# Patient Record
Sex: MALE | Race: WHITE | NOT HISPANIC OR LATINO | Employment: FULL TIME | URBAN - METROPOLITAN AREA
[De-identification: names, ages, dates, MRNs, and addresses within clinical notes are randomized per-mention and may not be internally consistent; named-entity substitution may affect disease eponyms.]

---

## 2017-01-14 ENCOUNTER — APPOINTMENT (OUTPATIENT)
Dept: LAB | Facility: HOSPITAL | Age: 42
End: 2017-01-14
Payer: COMMERCIAL

## 2017-01-14 ENCOUNTER — TRANSCRIBE ORDERS (OUTPATIENT)
Dept: LAB | Facility: HOSPITAL | Age: 42
End: 2017-01-14

## 2017-01-14 DIAGNOSIS — Z00.00 ROUTINE GENERAL MEDICAL EXAMINATION AT A HEALTH CARE FACILITY: Primary | ICD-10-CM

## 2017-01-14 DIAGNOSIS — Z00.00 ROUTINE GENERAL MEDICAL EXAMINATION AT A HEALTH CARE FACILITY: ICD-10-CM

## 2017-01-14 LAB
ALBUMIN SERPL BCP-MCNC: 4.4 G/DL (ref 3.5–5)
ALP SERPL-CCNC: 59 U/L (ref 46–116)
ALT SERPL W P-5'-P-CCNC: 111 U/L (ref 12–78)
ANION GAP SERPL CALCULATED.3IONS-SCNC: 7 MMOL/L (ref 4–13)
AST SERPL W P-5'-P-CCNC: 50 U/L (ref 5–45)
BILIRUB SERPL-MCNC: 0.6 MG/DL (ref 0.2–1)
BUN SERPL-MCNC: 15 MG/DL (ref 5–25)
CALCIUM SERPL-MCNC: 8.8 MG/DL (ref 8.3–10.1)
CHLORIDE SERPL-SCNC: 105 MMOL/L (ref 100–108)
CHOLEST SERPL-MCNC: 171 MG/DL (ref 50–200)
CO2 SERPL-SCNC: 27 MMOL/L (ref 21–32)
CREAT SERPL-MCNC: 0.87 MG/DL (ref 0.6–1.3)
GFR SERPL CREATININE-BSD FRML MDRD: >60 ML/MIN/1.73SQ M
GLUCOSE SERPL-MCNC: 104 MG/DL (ref 65–140)
HDLC SERPL-MCNC: 40 MG/DL (ref 40–60)
LDLC SERPL CALC-MCNC: 101 MG/DL (ref 0–100)
POTASSIUM SERPL-SCNC: 4.2 MMOL/L (ref 3.5–5.3)
PROT SERPL-MCNC: 7.8 G/DL (ref 6.4–8.2)
SODIUM SERPL-SCNC: 139 MMOL/L (ref 136–145)
TRIGL SERPL-MCNC: 151 MG/DL

## 2017-01-14 PROCEDURE — 36415 COLL VENOUS BLD VENIPUNCTURE: CPT

## 2017-01-14 PROCEDURE — 80061 LIPID PANEL: CPT

## 2017-01-14 PROCEDURE — 80053 COMPREHEN METABOLIC PANEL: CPT

## 2017-03-06 RX ORDER — MULTIVITAMIN
1 TABLET ORAL DAILY
COMMUNITY

## 2017-03-06 RX ORDER — UREA 10 %
1 LOTION (ML) TOPICAL
COMMUNITY
End: 2018-11-14

## 2017-03-09 ENCOUNTER — ANESTHESIA EVENT (OUTPATIENT)
Dept: PERIOP | Facility: HOSPITAL | Age: 42
End: 2017-03-09
Payer: COMMERCIAL

## 2017-03-10 ENCOUNTER — HOSPITAL ENCOUNTER (OUTPATIENT)
Facility: HOSPITAL | Age: 42
Setting detail: OUTPATIENT SURGERY
Discharge: HOME | End: 2017-03-10
Attending: COLON & RECTAL SURGERY | Admitting: COLON & RECTAL SURGERY
Payer: COMMERCIAL

## 2017-03-10 ENCOUNTER — ANESTHESIA (OUTPATIENT)
Dept: PERIOP | Facility: HOSPITAL | Age: 42
End: 2017-03-10
Payer: COMMERCIAL

## 2017-03-10 VITALS
HEART RATE: 83 BPM | RESPIRATION RATE: 20 BRPM | BODY MASS INDEX: 31.56 KG/M2 | DIASTOLIC BLOOD PRESSURE: 60 MMHG | WEIGHT: 233 LBS | OXYGEN SATURATION: 96 % | SYSTOLIC BLOOD PRESSURE: 125 MMHG | HEIGHT: 72 IN | TEMPERATURE: 97.4 F

## 2017-03-10 PROBLEM — K64.8 HEMORRHOIDS, COMPLICATED: Status: ACTIVE | Noted: 2017-03-10

## 2017-03-10 RX ORDER — PROPOFOL 10 MG/ML
INJECTION, EMULSION INTRAVENOUS AS NEEDED
Status: DISCONTINUED | OUTPATIENT
Start: 2017-03-10 | End: 2017-03-10 | Stop reason: SURG

## 2017-03-10 RX ORDER — SODIUM CHLORIDE, SODIUM LACTATE, POTASSIUM CHLORIDE, CALCIUM CHLORIDE 600; 310; 30; 20 MG/100ML; MG/100ML; MG/100ML; MG/100ML
50 INJECTION, SOLUTION INTRAVENOUS CONTINUOUS
Status: DISCONTINUED | OUTPATIENT
Start: 2017-03-10 | End: 2017-03-10 | Stop reason: HOSPADM

## 2017-03-10 RX ORDER — SODIUM CHLORIDE, SODIUM LACTATE, POTASSIUM CHLORIDE, CALCIUM CHLORIDE 600; 310; 30; 20 MG/100ML; MG/100ML; MG/100ML; MG/100ML
125 INJECTION, SOLUTION INTRAVENOUS CONTINUOUS
Status: DISCONTINUED | OUTPATIENT
Start: 2017-03-10 | End: 2017-03-10 | Stop reason: HOSPADM

## 2017-03-10 RX ADMIN — PROPOFOL 150 MG: 10 INJECTION, EMULSION INTRAVENOUS at 07:39

## 2017-03-10 RX ADMIN — SODIUM CHLORIDE, SODIUM LACTATE, POTASSIUM CHLORIDE, AND CALCIUM CHLORIDE 50 ML/HR: .6; .31; .03; .02 INJECTION, SOLUTION INTRAVENOUS at 06:37

## 2017-03-10 RX ADMIN — PROPOFOL 50 MG: 10 INJECTION, EMULSION INTRAVENOUS at 07:42

## 2017-10-26 ENCOUNTER — TRANSCRIBE ORDERS (OUTPATIENT)
Dept: ADMINISTRATIVE | Facility: HOSPITAL | Age: 42
End: 2017-10-26

## 2017-10-26 DIAGNOSIS — R91.8 ABNORMAL CHEST X-RAY WITH MULTIPLE LUNG NODULES: Primary | ICD-10-CM

## 2017-10-31 ENCOUNTER — HOSPITAL ENCOUNTER (OUTPATIENT)
Dept: CT IMAGING | Facility: HOSPITAL | Age: 42
Discharge: HOME | End: 2017-10-31
Payer: COMMERCIAL

## 2017-10-31 DIAGNOSIS — R91.8 ABNORMAL CHEST X-RAY WITH MULTIPLE LUNG NODULES: ICD-10-CM

## 2017-10-31 PROCEDURE — 71250 CT THORAX DX C-: CPT

## 2017-11-13 ENCOUNTER — HOSPITAL ENCOUNTER (EMERGENCY)
Facility: HOSPITAL | Age: 42
Discharge: HOME | End: 2017-11-13
Attending: EMERGENCY MEDICINE | Admitting: EMERGENCY MEDICINE
Payer: COMMERCIAL

## 2017-11-13 ENCOUNTER — APPOINTMENT (EMERGENCY)
Dept: CT IMAGING | Facility: HOSPITAL | Age: 42
End: 2017-11-13
Payer: COMMERCIAL

## 2017-11-13 VITALS
WEIGHT: 220 LBS | DIASTOLIC BLOOD PRESSURE: 81 MMHG | HEART RATE: 83 BPM | HEIGHT: 72 IN | SYSTOLIC BLOOD PRESSURE: 127 MMHG | OXYGEN SATURATION: 100 % | BODY MASS INDEX: 29.8 KG/M2 | TEMPERATURE: 98.6 F | RESPIRATION RATE: 18 BRPM

## 2017-11-13 DIAGNOSIS — R10.31 BILATERAL GROIN PAIN: ICD-10-CM

## 2017-11-13 DIAGNOSIS — R10.32 BILATERAL GROIN PAIN: ICD-10-CM

## 2017-11-13 DIAGNOSIS — M54.50 BILATERAL LOW BACK PAIN WITHOUT SCIATICA: Primary | ICD-10-CM

## 2017-11-13 LAB
ALBUMIN SERPL BCP-MCNC: 4.8 G/DL (ref 3.5–5)
ALP SERPL-CCNC: 58 U/L (ref 46–116)
ALT SERPL W P-5'-P-CCNC: 99 U/L (ref 12–78)
ANION GAP SERPL CALCULATED.3IONS-SCNC: 11 MMOL/L (ref 4–13)
AST SERPL W P-5'-P-CCNC: 51 U/L (ref 5–45)
BASOPHILS # BLD AUTO: 0.11 THOUSANDS/ΜL (ref 0–0.1)
BASOPHILS NFR BLD AUTO: 1 % (ref 0–1)
BILIRUB SERPL-MCNC: 0.9 MG/DL (ref 0.2–1)
BILIRUB UR QL STRIP: NEGATIVE
BUN SERPL-MCNC: 14 MG/DL (ref 5–25)
CALCIUM SERPL-MCNC: 9.4 MG/DL (ref 8.3–10.1)
CHLORIDE SERPL-SCNC: 103 MMOL/L (ref 100–108)
CLARITY UR: CLEAR
CO2 SERPL-SCNC: 26 MMOL/L (ref 21–32)
COLOR UR: YELLOW
CREAT SERPL-MCNC: 0.92 MG/DL (ref 0.6–1.3)
EOSINOPHIL # BLD AUTO: 0.18 THOUSAND/ΜL (ref 0–0.61)
EOSINOPHIL NFR BLD AUTO: 2 % (ref 0–6)
ERYTHROCYTE [DISTWIDTH] IN BLOOD BY AUTOMATED COUNT: 12.7 % (ref 11.6–15.1)
GFR SERPL CREATININE-BSD FRML MDRD: 102 ML/MIN/1.73SQ M
GLUCOSE SERPL-MCNC: 83 MG/DL (ref 65–140)
GLUCOSE UR STRIP-MCNC: NEGATIVE MG/DL
HCT VFR BLD AUTO: 51 % (ref 36.5–49.3)
HGB BLD-MCNC: 17.2 G/DL (ref 12–17)
HGB UR QL STRIP.AUTO: NEGATIVE
KETONES UR STRIP-MCNC: NEGATIVE MG/DL
LEUKOCYTE ESTERASE UR QL STRIP: NEGATIVE
LIPASE SERPL-CCNC: 295 U/L (ref 73–393)
LYMPHOCYTES # BLD AUTO: 1.48 THOUSANDS/ΜL (ref 0.6–4.47)
LYMPHOCYTES NFR BLD AUTO: 18 % (ref 14–44)
MCH RBC QN AUTO: 29.2 PG (ref 26.8–34.3)
MCHC RBC AUTO-ENTMCNC: 33.7 G/DL (ref 31.4–37.4)
MCV RBC AUTO: 87 FL (ref 82–98)
MONOCYTES # BLD AUTO: 1.14 THOUSAND/ΜL (ref 0.17–1.22)
MONOCYTES NFR BLD AUTO: 14 % (ref 4–12)
NEUTROPHILS # BLD AUTO: 5.16 THOUSANDS/ΜL (ref 1.85–7.62)
NEUTS SEG NFR BLD AUTO: 64 % (ref 43–75)
NITRITE UR QL STRIP: NEGATIVE
NRBC BLD AUTO-RTO: 0 /100 WBCS
PH UR STRIP.AUTO: 6 [PH] (ref 4.5–8)
PLATELET # BLD AUTO: 267 THOUSANDS/UL (ref 149–390)
PMV BLD AUTO: 11.6 FL (ref 8.9–12.7)
POTASSIUM SERPL-SCNC: 4.3 MMOL/L (ref 3.5–5.3)
PROT SERPL-MCNC: 8.3 G/DL (ref 6.4–8.2)
PROT UR STRIP-MCNC: NEGATIVE MG/DL
RBC # BLD AUTO: 5.89 MILLION/UL (ref 3.88–5.62)
SODIUM SERPL-SCNC: 140 MMOL/L (ref 136–145)
SP GR UR STRIP.AUTO: >=1.03 (ref 1–1.03)
UROBILINOGEN UR QL STRIP.AUTO: 0.2 E.U./DL
WBC # BLD AUTO: 8.09 THOUSAND/UL (ref 4.31–10.16)

## 2017-11-13 PROCEDURE — 96374 THER/PROPH/DIAG INJ IV PUSH: CPT

## 2017-11-13 PROCEDURE — 85025 COMPLETE CBC W/AUTO DIFF WBC: CPT | Performed by: EMERGENCY MEDICINE

## 2017-11-13 PROCEDURE — 83690 ASSAY OF LIPASE: CPT | Performed by: EMERGENCY MEDICINE

## 2017-11-13 PROCEDURE — 74177 CT ABD & PELVIS W/CONTRAST: CPT

## 2017-11-13 PROCEDURE — 80053 COMPREHEN METABOLIC PANEL: CPT | Performed by: EMERGENCY MEDICINE

## 2017-11-13 PROCEDURE — 99284 EMERGENCY DEPT VISIT MOD MDM: CPT

## 2017-11-13 PROCEDURE — 87491 CHLMYD TRACH DNA AMP PROBE: CPT | Performed by: EMERGENCY MEDICINE

## 2017-11-13 PROCEDURE — 87591 N.GONORRHOEAE DNA AMP PROB: CPT | Performed by: EMERGENCY MEDICINE

## 2017-11-13 PROCEDURE — 36415 COLL VENOUS BLD VENIPUNCTURE: CPT | Performed by: EMERGENCY MEDICINE

## 2017-11-13 PROCEDURE — 81003 URINALYSIS AUTO W/O SCOPE: CPT | Performed by: EMERGENCY MEDICINE

## 2017-11-13 PROCEDURE — 96361 HYDRATE IV INFUSION ADD-ON: CPT

## 2017-11-13 RX ORDER — METHOCARBAMOL 500 MG/1
750 TABLET, FILM COATED ORAL ONCE
Status: COMPLETED | OUTPATIENT
Start: 2017-11-13 | End: 2017-11-13

## 2017-11-13 RX ORDER — ACETAMINOPHEN 325 MG/1
650 TABLET ORAL ONCE
Status: COMPLETED | OUTPATIENT
Start: 2017-11-13 | End: 2017-11-13

## 2017-11-13 RX ORDER — NAPROXEN 500 MG/1
500 TABLET ORAL EVERY 12 HOURS PRN
Qty: 20 TABLET | Refills: 0 | Status: SHIPPED | OUTPATIENT
Start: 2017-11-13 | End: 2022-05-27

## 2017-11-13 RX ORDER — METHOCARBAMOL 750 MG/1
750 TABLET, FILM COATED ORAL EVERY 8 HOURS PRN
Qty: 12 TABLET | Refills: 0 | Status: ON HOLD | OUTPATIENT
Start: 2017-11-13 | End: 2018-08-23 | Stop reason: CLARIF

## 2017-11-13 RX ORDER — KETOROLAC TROMETHAMINE 30 MG/ML
15 INJECTION, SOLUTION INTRAMUSCULAR; INTRAVENOUS ONCE
Status: COMPLETED | OUTPATIENT
Start: 2017-11-13 | End: 2017-11-13

## 2017-11-13 RX ADMIN — SODIUM CHLORIDE 1000 ML: 0.9 INJECTION, SOLUTION INTRAVENOUS at 16:26

## 2017-11-13 RX ADMIN — IOHEXOL 100 ML: 350 INJECTION, SOLUTION INTRAVENOUS at 17:37

## 2017-11-13 RX ADMIN — ACETAMINOPHEN 650 MG: 325 TABLET ORAL at 16:25

## 2017-11-13 RX ADMIN — KETOROLAC TROMETHAMINE 15 MG: 30 INJECTION, SOLUTION INTRAMUSCULAR at 16:26

## 2017-11-13 RX ADMIN — METHOCARBAMOL 750 MG: 500 TABLET ORAL at 17:05

## 2017-11-13 NOTE — DISCHARGE INSTRUCTIONS
Acute Low Back Pain   WHAT YOU NEED TO KNOW:   Acute low back pain is sudden discomfort in your lower back area that lasts for up to 6 weeks  The discomfort makes it difficult to tolerate activity  DISCHARGE INSTRUCTIONS:   Return to the emergency department if:   · You have severe pain  · You have sudden stiffness and heaviness on both buttocks down to both legs  · You have numbness or weakness in one leg, or pain in both legs  · You have numbness in your genital area or across your lower back  · You cannot control your urine or bowel movements  Contact your healthcare provider if:   · You have a fever  · You have pain at night or when you rest     · Your pain does not get better with treatment  · You have pain that worsens when you cough or sneeze  · You suddenly feel something pop or snap in your back  · You have questions or concerns about your condition or care  Medicines: The following medicines may be ordered by your healthcare provider:  · Acetaminophen  decreases pain  It is available without a doctor's order  Ask how much to take and how often to take it  Follow directions  Acetaminophen can cause liver damage if not taken correctly  · NSAIDs  help decrease swelling and pain  This medicine is available with or without a doctor's order  NSAIDs can cause stomach bleeding or kidney problems in certain people  If you take blood thinner medicine, always ask your healthcare provider if NSAIDs are safe for you  Always read the medicine label and follow directions  · Prescription pain medicine  may be given  Ask your healthcare provider how to take this medicine safely  · Muscle relaxers  decrease pain by relaxing the muscles in your lower spine  · Take your medicine as directed  Contact your healthcare provider if you think your medicine is not helping or if you have side effects  Tell him of her if you are allergic to any medicine   Keep a list of the medicines, vitamins, and herbs you take  Include the amounts, and when and why you take them  Bring the list or the pill bottles to follow-up visits  Carry your medicine list with you in case of an emergency  Self-care:   · Stay active  as much as you can without causing more pain  Bed rest could make your back pain worse  Start with some light exercises such as walking  Avoid heavy lifting until your pain is gone  Ask for more information about the activities or exercises that are right for you  · Ice  helps decrease swelling, pain, and muscle spams  Put crushed ice in a plastic bag  Cover it with a towel  Place it on your lower back for 20 to 30 minutes every 2 hours  Do this for about 2 to 3 days after your pain starts, or as directed  · Heat  helps decrease pain and muscle spasms  Start to use heat after treatment with ice has stopped  Use a small towel dampened with warm water or a heating pad, or sit in a warm bath  Apply heat on the area for 20 to 30 minutes every 2 hours for as many days as directed  Alternate heat and ice  Prevent acute low back pain:   · Use proper body mechanics  ¨ Bend at the hips and knees when you  objects  Do not bend from the waist  Use your leg muscles as you lift the load  Do not use your back  Keep the object close to your chest as you lift it  Try not to twist or lift anything above your waist     ¨ Change your position often when you stand for long periods of time  Rest one foot on a small box or footrest, and then switch to the other foot often  ¨ Try not to sit for long periods of time  When you do, sit in a straight-backed chair with your feet flat on the floor  Never reach, pull, or push while you are sitting  · Do exercises that strengthen your back muscles  Warm up before you exercise  Ask your healthcare provider the best exercises for you  · Maintain a healthy weight  Ask your healthcare provider how much you should weigh   Ask him to help you create a weight loss plan if you are overweight  Follow up with your healthcare provider as directed:  Return for a follow-up visit if you still have pain after 1 to 3 weeks of treatment  You may need to visit an orthopedist if your back pain lasts more than 12 weeks  Write down your questions so you remember to ask them during your visits  © 2017 [facility] Information is for End User's use only and may not be sold, redistributed or otherwise used for commercial purposes  All illustrations and images included in CareNotes® are the copyrighted property of A D A M , Inc  or [employer]  The above information is an  only  It is not intended as medical advice for individual conditions or treatments  Talk to your doctor, nurse or pharmacist before following any medical regimen to see if it is safe and effective for you  Groin Pain   WHAT YOU NEED TO KNOW:   Groin pain may be felt only in your groin, or it may spread to your buttocks, thigh, or knee  An injury to your hip joint, pelvic area, lower back, or thighs can cause groin pain  DISCHARGE INSTRUCTIONS:   Medicines: You may need any of the following:  · NSAIDs , such as ibuprofen, help decrease swelling, pain, and fever  This medicine is available with or without a doctor's order  NSAIDs can cause stomach bleeding or kidney problems in certain people  If you take blood thinner medicine, always ask if NSAIDs are safe for you  Always read the medicine label and follow directions  Do not give these medicines to children under 10months of age without direction from your child's healthcare provider  · Acetaminophen  decreases pain  It is available without a doctor's order  Ask how much to take and how often to take it  Follow directions  Acetaminophen can cause liver damage if not taken correctly  · Take your medicine as directed    Contact your healthcare provider if you think your medicine is not helping or if you have side effects  Tell him of her if you are allergic to any medicine  Keep a list of the medicines, vitamins, and herbs you take  Include the amounts, and when and why you take them  Bring the list or the pill bottles to follow-up visits  Carry your medicine list with you in case of an emergency  Follow up with your healthcare provider as directed: You may need to return for more tests  Write down your questions so you remember to ask them during your visits  Self-care:   · Rest  as much as possible  Avoid activities that cause or increase your pain  · Apply ice  on your groin for 15 to 20 minutes every hour or as directed  Use an ice pack, or put crushed ice in a plastic bag  Cover it with a towel  Ice helps prevent tissue damage and decreases swelling and pain  · Apply heat  on your groin for 20 to 30 minutes every 2 hours for as many days as directed  Heat helps decrease pain and muscle spasms  Contact your healthcare provider if:   · You have a fever  · You have questions or concerns about your condition or care  Return to the emergency department if:   · You have severe pain even after you take medicine  · You have pain or burning when you urinate  · You have pain on your side that spreads to your groin  © 2017 [facility] Information is for End User's use only and may not be sold, redistributed or otherwise used for commercial purposes  All illustrations and images included in CareNotes® are the copyrighted property of [employer]  or [employer]  The above information is an  only  It is not intended as medical advice for individual conditions or treatments  Talk to your doctor, nurse or pharmacist before following any medical regimen to see if it is safe and effective for you

## 2017-11-13 NOTE — ED PROVIDER NOTES
History  Chief Complaint   Patient presents with    Flank Pain     Pt states he has been having B/L lower back pain that radiates to B/L groin  Pt denies any swelling of testicles  Pt does have hx of kidney stones     Patient is a 79-year-old male with past medical history of psoriatic arthritis, history of kidney stones, prior hernia repair, presents to the emergency department complaining of 1-2 weeks of bilateral low back pain as well as bilateral groin pain  Patient states he has had a squeezing type of pain in his low back that has been fairly constant however the severity of the pain waxes and wanes over the past 1-2 weeks  He also feels a separate pain that he describes as a twinge" in his bilateral inguinal region/groin  He has been taking Aleve with minimal relief  Overall the symptoms have been worsening  He also reports intermittent strong odor to his urine but denies any other associated symptoms  No fever, chills, night sweats, unexplained weight changes, headache, dizziness or near syncope, visual changes, URI symptoms or cough, chest pain, dyspnea, palpitations, abdominal pain, nausea, vomiting, diarrhea, constipation, blood per rectum or melena, dysuria, frequency, hematuria, penile discharge, testicular pain or swelling, skin rash or color change, extremity weakness or paresthesia or other focal neurologic deficits  Denies any recent strenuous activity or heavy lifting prior to the pain starting  Denies any recent trauma to the back or recent motor vehicle collision  History provided by:  Patient   used: No    Flank Pain   Associated symptoms: no chest pain, no chills, no constipation, no cough, no diarrhea, no dysuria, no fatigue, no fever, no hematuria, no nausea, no shortness of breath, no sore throat and no vomiting        Prior to Admission Medications   Prescriptions Last Dose Informant Patient Reported? Taking?    Adalimumab (HUMIRA PEN) 40 MG/0 8ML PNKT Yes No   Sig: Inject 40 mg under the skin   Azelaic Acid (FINACEA) 15 % cream   Yes No   Sig: As needed   Dermatological Products, Misc  (ELETONE) CREA   Yes No   Sig: As needed   HYDROcodone-acetaminophen (NORCO) 5-325 mg per tablet   No No   Sig: Take 1 tablet by mouth every 6 (six) hours as needed for moderate pain (Not relieved by Anti-inflammatory) Max Daily Amount: 4 tablets   Multiple Vitamin (MULTIVITAMIN) tablet   Yes No   Sig: Take 1 tablet by mouth daily   TURMERIC PO   Yes No   Sig: Take by mouth   Triamcinolone Acetonide 55 MCG/ACT AERO   Yes No   Si sprays each nare as needed   gabapentin (NEURONTIN) 100 mg capsule   Yes No   Sig: Take 100 mg by mouth 3 (three) times a day     melatonin 1 mg   Yes No   Sig: Take 1 mg by mouth daily at bedtime   metroNIDAZOLE (METROGEL) 1 % gel   Yes No   Sig: As needed   naproxen (NAPROSYN) 500 mg tablet   Yes No   Sig: Take by mouth   ondansetron (ZOFRAN-ODT) 4 mg disintegrating tablet   No No   Sig: Take 1 tablet by mouth every 8 (eight) hours as needed for nausea or vomiting for up to 3 days   tamsulosin (FLOMAX) 0 4 mg   No No   Sig: Take 1 capsule by mouth daily with dinner for 5 days      Facility-Administered Medications: None       Past Medical History:   Diagnosis Date    Arthritis     Hemorrhoids, complicated     Kidney stones     Migraine        Past Surgical History:   Procedure Laterality Date    HERNIA REPAIR      KIDNEY STONE SURGERY      PA COLONOSCOPY FLX DX W/COLLJ SPEC WHEN PFRMD N/A 3/10/2017    Procedure: COLONOSCOPY;  Surgeon: Mary Blanco MD;  Location: [facility]; Service: Colorectal    ULNAR NERVE REPAIR         History reviewed  No pertinent family history  I have reviewed and agree with the history as documented      Social History   Substance Use Topics    Smoking status: Never Smoker    Smokeless tobacco: Never Used    Alcohol use No        Review of Systems   Constitutional: Negative for chills, diaphoresis, fatigue, fever and unexpected weight change  HENT: Negative for congestion, ear pain, rhinorrhea and sore throat  Eyes: Negative for photophobia, pain and visual disturbance  Respiratory: Negative for cough, chest tightness, shortness of breath and wheezing  Cardiovascular: Negative for chest pain and palpitations  Gastrointestinal: Negative for abdominal distention, abdominal pain, blood in stool, constipation, diarrhea, nausea and vomiting  Genitourinary: Positive for flank pain  Negative for decreased urine volume, difficulty urinating, discharge, dysuria, frequency, genital sores, hematuria, penile swelling, scrotal swelling and testicular pain         + Bilateral groin/inguinal pain  + Strong urine odor  Musculoskeletal: Positive for back pain  Negative for gait problem, neck pain and neck stiffness  Skin: Negative for color change, pallor, rash and wound  Allergic/Immunologic: Negative for immunocompromised state  Neurological: Negative for dizziness, syncope, weakness, light-headedness, numbness and headaches  Hematological: Negative for adenopathy  Psychiatric/Behavioral: Negative for confusion, decreased concentration and sleep disturbance  All other systems reviewed and are negative  Physical Exam  ED Triage Vitals [11/13/17 1439]   Temperature Pulse Respirations Blood Pressure SpO2   98 6 °F (37 °C) 91 18 139/95 96 %      Temp Source Heart Rate Source Patient Position - Orthostatic VS BP Location FiO2 (%)   Oral Monitor Sitting Left arm --      Pain Score       7         Vitals:    11/13/17 1439 11/13/17 1648 11/13/17 1830   BP: 139/95 136/82 127/81   Pulse: 91 81 83   Resp: 18 18 18   Temp: 98 6 °F (37 °C)     TempSrc: Oral     SpO2: 96% 96% 100%   Weight: 99 8 kg (220 lb)     Height: 6' (1 829 m)       Physical Exam   Constitutional: He is oriented to person, place, and time  He appears well-developed and well-nourished  No distress     HENT:   Head: Normocephalic and atraumatic  Mouth/Throat: Oropharynx is clear and moist  No oropharyngeal exudate  Eyes: Conjunctivae and EOM are normal  Pupils are equal, round, and reactive to light  Neck: Normal range of motion  Neck supple  No JVD present  Cardiovascular: Normal rate, regular rhythm, normal heart sounds and intact distal pulses  Exam reveals no gallop and no friction rub  No murmur heard  Pulmonary/Chest: Effort normal and breath sounds normal  No respiratory distress  He has no wheezes  He has no rales  Abdominal: Soft  Bowel sounds are normal  He exhibits no distension  There is no tenderness  There is no rebound and no guarding  Genitourinary: Penis normal  No penile tenderness  Genitourinary Comments: Normal descended testes without tenderness or scrotal swelling  Bilateral inguinal tenderness without significant lymphadenopathy  No evidence of Teodoro gangrene  Musculoskeletal: Normal range of motion  He exhibits tenderness  He exhibits no edema  No midline C/T/L-spine tenderness  Bilateral lumbar paravertebral muscle tenderness  Lymphadenopathy:     He has no cervical adenopathy  Neurological: He is alert and oriented to person, place, and time  No gross motor or sensory deficits  5/5 strength throughout  Skin: Skin is warm and dry  No rash noted  He is not diaphoretic  No erythema  No pallor  Psychiatric: He has a normal mood and affect  His behavior is normal    Nursing note and vitals reviewed        ED Medications  Medications   sodium chloride 0 9 % bolus 1,000 mL (0 mL Intravenous Stopped 11/13/17 1918)   acetaminophen (TYLENOL) tablet 650 mg (650 mg Oral Given 11/13/17 1625)   ketorolac (TORADOL) 30 mg/mL injection 15 mg (15 mg Intravenous Given 11/13/17 1626)   methocarbamol (ROBAXIN) tablet 750 mg (750 mg Oral Given 11/13/17 1705)   iohexol (OMNIPAQUE) 350 MG/ML injection (MULTI-DOSE) 100 mL (100 mL Intravenous Given 11/13/17 1737)       Diagnostic Studies  Results Reviewed Procedure Component Value Units Date/Time    Chlamydia/GC amplified DNA by PCR [[id number]] Collected:  11/13/17 1705    Lab Status: In process Specimen:  Urine from Urine, Other Updated:  11/13/17 1713    Lipase [[id number]]  (Normal) Collected:  11/13/17 1623    Lab Status:  Final result Specimen:  Blood from Arm, Right Updated:  11/13/17 1654     Lipase 295 u/L     Comprehensive metabolic panel [[id number]]  (Abnormal) Collected:  11/13/17 1623    Lab Status:  Final result Specimen:  Blood from Arm, Right Updated:  11/13/17 1654     Sodium 140 mmol/L      Potassium 4 3 mmol/L      Chloride 103 mmol/L      CO2 26 mmol/L      Anion Gap 11 mmol/L      BUN 14 mg/dL      Creatinine 0 92 mg/dL      Glucose 83 mg/dL      Calcium 9 4 mg/dL      AST 51 (H) U/L      ALT 99 (H) U/L      Alkaline Phosphatase 58 U/L      Total Protein 8 3 (H) g/dL      Albumin 4 8 g/dL      Total Bilirubin 0 90 mg/dL      eGFR 102 ml/min/1 73sq m     Narrative:         National Kidney Disease Education Program recommendations are as follows:  GFR calculation is accurate only with a steady state creatinine  Chronic Kidney disease less than 60 ml/min/1 73 sq  meters  Kidney failure less than 15 ml/min/1 73 sq  meters      UA w Reflex to Microscopic [[id number]]  (Normal) Collected:  11/13/17 1624    Lab Status:  Final result Specimen:  Urine from Urine, Clean Catch Updated:  11/13/17 1636     Color, UA Yellow     Clarity, UA Clear     Specific Gravity, UA >=1 030     pH, UA 6 0     Leukocytes, UA Negative     Nitrite, UA Negative     Protein, UA Negative mg/dl      Glucose, UA Negative mg/dl      Ketones, UA Negative mg/dl      Urobilinogen, UA 0 2 E U /dl      Bilirubin, UA Negative     Blood, UA Negative    CBC and differential [[id number]]  (Abnormal) Collected:  11/13/17 1623    Lab Status:  Final result Specimen:  Blood from Arm, Right Updated:  11/13/17 1635     WBC 8 09 Thousand/uL      RBC 5 89 (H) Million/uL      Hemoglobin 17 2 (H) g/dL Hematocrit 51 0 (H) %      MCV 87 fL      MCH 29 2 pg      MCHC 33 7 g/dL      RDW 12 7 %      MPV 11 6 fL      Platelets 118 Thousands/uL      nRBC 0 /100 WBCs      Neutrophils Relative 64 %      Lymphocytes Relative 18 %      Monocytes Relative 14 (H) %      Eosinophils Relative 2 %      Basophils Relative 1 %      Neutrophils Absolute 5 16 Thousands/µL      Lymphocytes Absolute 1 48 Thousands/µL      Monocytes Absolute 1 14 Thousand/µL      Eosinophils Absolute 0 18 Thousand/µL      Basophils Absolute 0 11 (H) Thousands/µL                  CT abdomen pelvis with contrast   Final Result by Dinorah Pope DO (11/13 1816)   No CT explanation for patient's symptoms  Workstation performed: [id number]                    Procedures  Procedures       Phone Contacts  ED Phone Contact    ED Course  ED Course                                MDM  Number of Diagnoses or Management Options  Bilateral groin pain:   Bilateral low back pain without sciatica:   Diagnosis management comments: Bilateral low back pain as well as bilateral groin pain  Differential includes musculoskeletal pain such as strain or spasm, kidney stone or infection, hernia, constipation, intra-abdominal process such as pancreatitis, appendicitis  Will check abdominal labs, urinalysis and obtain a CT scan of the abdomen and pelvis  Will give IV fluids and Toradol for pain  Amount and/or Complexity of Data Reviewed  Clinical lab tests: ordered and reviewed  Tests in the radiology section of CPT®: ordered and reviewed  Independent visualization of images, tracings, or specimens: yes    Patient Progress  Patient progress: (Patient reassessed and pain had improved  Discussed normal blood work and CT scan    Advised him to follow up with family doctor and to return if any of his symptoms worsen )    CritCare Time    Disposition  Final diagnoses:   Bilateral low back pain without sciatica   Bilateral groin pain     Time reflects when diagnosis was documented in both MDM as applicable and the Disposition within this note     Time User Action Codes Description Comment    11/13/2017  6:47 PM Karely KELLER Add [M54 5] Bilateral low back pain without sciatica     11/13/2017  6:48 PM Karely KELLER Add [R10 30] Bilateral groin pain       ED Disposition     ED Disposition Condition Comment    Discharge  Alfonzo Kuhn discharge to home/self care  Condition at discharge: Stable        Follow-up Information     Follow up With Specialties Details Why Contact Info Additional Via Arpit Bridges MD Family Medicine Schedule an appointment as soon as possible for a visit  [address] [facility]  [address],[place] [address]       [facility] Emergency Department Emergency Medicine Go to If symptoms worsen [address] [facility] [address]  [phone] [place] [facility], [address] [place] [address], [id number]        Discharge Medication List as of 11/13/2017  6:49 PM      START taking these medications    Details   methocarbamol (ROBAXIN) 750 mg tablet Take 1 tablet by mouth every 8 (eight) hours as needed for muscle spasms, Starting Mon 11/13/2017, Print      !! naproxen (NAPROSYN) 500 mg tablet Take 1 tablet by mouth every 12 (twelve) hours as needed for mild pain or moderate pain, Starting Mon 11/13/2017, Print       !! - Potential duplicate medications found  Please discuss with provider        CONTINUE these medications which have NOT CHANGED    Details   Adalimumab (HUMIRA PEN) 40 MG/0 8ML PNKT Inject 40 mg under the skin, Starting 8/31/2016, Until Discontinued, Historical Med      Azelaic Acid (FINACEA) 15 % cream As needed, Historical Med      Dermatological Products, Misc  (ELETONE) CREA As needed, Historical Med      gabapentin (NEURONTIN) 100 mg capsule Take 100 mg by mouth 3 (three) times a day  , Starting 8/31/2016, Until Discontinued, Historical Med      HYDROcodone-acetaminophen ([address]) 5-325 mg per tablet Take 1 tablet by mouth every 6 (six) hours as needed for moderate pain (Not relieved by Anti-inflammatory) Max Daily Amount: 4 tablets, Starting 10/10/2016, Until Discontinued, Print      melatonin 1 mg Take 1 mg by mouth daily at bedtime, Until Discontinued, Historical Med      metroNIDAZOLE (METROGEL) 1 % gel As needed, Historical Med      Multiple Vitamin (MULTIVITAMIN) tablet Take 1 tablet by mouth daily, Until Discontinued, Historical Med      !! naproxen (NAPROSYN) 500 mg tablet Take by mouth, Starting 5/31/2012, Until Discontinued, Historical Med      ondansetron (ZOFRAN-ODT) 4 mg disintegrating tablet Take 1 tablet by mouth every 8 (eight) hours as needed for nausea or vomiting for up to 3 days, Starting 10/10/2016, Until Thu 10/13/16, Print      tamsulosin (FLOMAX) 0 4 mg Take 1 capsule by mouth daily with dinner for 5 days, Starting 10/10/2016, Until Sat 10/15/16, Print      Triamcinolone Acetonide 55 MCG/ACT AERO 2 sprays each nare as needed, Historical Med      TURMERIC PO Take by mouth, Until Discontinued, Historical Med       !! - Potential duplicate medications found  Please discuss with provider  No discharge procedures on file      ED Provider  Electronically Signed by           Verito St DO  11/13/17 2046

## 2017-11-15 LAB
CHLAMYDIA DNA CVX QL NAA+PROBE: NORMAL
N GONORRHOEA DNA GENITAL QL NAA+PROBE: NORMAL

## 2017-11-27 ENCOUNTER — TRANSCRIBE ORDERS (OUTPATIENT)
Dept: MRI IMAGING | Facility: CLINIC | Age: 42
End: 2017-11-27

## 2017-11-27 DIAGNOSIS — J32.2 CHRONIC ETHMOIDAL SINUSITIS: Primary | ICD-10-CM

## 2017-11-27 DIAGNOSIS — J32.0 CHRONIC MAXILLARY SINUSITIS: ICD-10-CM

## 2017-11-28 ENCOUNTER — HOSPITAL ENCOUNTER (OUTPATIENT)
Dept: CT IMAGING | Facility: CLINIC | Age: 42
Discharge: HOME | End: 2017-11-28
Payer: COMMERCIAL

## 2017-11-28 DIAGNOSIS — J32.0 CHRONIC MAXILLARY SINUSITIS: ICD-10-CM

## 2017-11-28 DIAGNOSIS — J32.2 CHRONIC ETHMOIDAL SINUSITIS: ICD-10-CM

## 2017-11-28 PROCEDURE — 70486 CT MAXILLOFACIAL W/O DYE: CPT

## 2018-01-12 NOTE — PROCEDURES
Results/Data    Procedure: Electromyogram and Nerve Conduction Study  Indication: Left Upper Extremity   Referred by Dr Shira Francis  The procedure's were discussed with the patient  Written consent was obtained prior to the procedure and is detailed in the patient's record  Prior to the start of the procedure a time out was taken and the identity of the patient was confirmed via name and date of birth with the patient  The correct site and the procedure to be performed were confirmed  The correct side was confirmed if applicable  The positioning of the patient was verified  The availability of the correct equipment was verified  Procedure Start Time: 11:15    Technique: A sterile concentric needle electrode was used  The patient tolerated the procedure well  There were no complications  Results : Motor and sensory nerve conduction studies were performed on the median and ulnar nerves  The median and ulnar compound motor action potentials were within normal limits  The median and ulnar F wave  latencies were within normal limits  The median and ulnar sensory action potential was within normal limits  The median palmar evoked response was prolonged by 0 4 ms as compared to the ulnar palmar evoked response at the same distance  Concentric needle examination was performed on various proximal and distal muscles including deltoid, biceps, triceps, pronator teres, APB, FDI and low cervical paraspinals  There was no evidence of active denervation in  any of the muscles tested  The compound motor unit action potentials were of normal configuration with interference patterns being full or full for effort  Interpretation: There is electrophysiologic evidence of a:    1  Mild median nerve compression neuropathy at the wrist with demyelinative changes, consistent with a diagnosis of carpal tunnel syndrome  2  There is no evidence of a cervical radiculopathy or ulnar neuropathy      Clinical correlation is recommended        Signatures   Electronically signed by : Vickie Champion MD; Aug 24 2016 12:07PM EST                       (Author)

## 2018-04-04 ENCOUNTER — PROCEDURE VISIT (OUTPATIENT)
Dept: NEUROLOGY | Facility: CLINIC | Age: 43
End: 2018-04-04
Payer: OTHER MISCELLANEOUS

## 2018-04-04 DIAGNOSIS — M79.642 LEFT HAND PAIN: Primary | ICD-10-CM

## 2018-04-04 PROCEDURE — 95886 MUSC TEST DONE W/N TEST COMP: CPT | Performed by: PHYSICAL MEDICINE & REHABILITATION

## 2018-04-04 PROCEDURE — 95908 NRV CNDJ TST 3-4 STUDIES: CPT | Performed by: PHYSICAL MEDICINE & REHABILITATION

## 2018-04-04 NOTE — PROGRESS NOTES
The procedure was discussed with the patient  Verbal consent was obtained after discussing risks and benefits  A sterile concentric needle electrode was used  The patient tolerated the procedure well  There were no complications  EMG LEFT UPPER EXTREMITY    Motor and sensory conduction studies were performed on the left median and ulnar nerves  The distal motor latencies were normal  The motor action potential amplitudes were normal  Motor conduction velocities were normal including conduction velocity of the ulnar nerve across the elbow  The left  median and ulnar F waves were normal     The left median and ulnar distal sensory latencies were normal including conduction of the median nerve across the palm with normal sensory action potential amplitudes  Concentric needle EMG was performed in various distal and proximal muscles of the left upper extremity including APB, FDI, pronator teres, deltoid, biceps, triceps and low cervical paraspinal region  IMPRESSION: Normal study      MARIE Ocampo

## 2018-04-25 ENCOUNTER — TRANSCRIBE ORDERS (OUTPATIENT)
Dept: NEUROLOGY | Facility: HOSPITAL | Age: 43
End: 2018-04-25

## 2018-08-22 ENCOUNTER — APPOINTMENT (EMERGENCY)
Dept: RADIOLOGY | Facility: HOSPITAL | Age: 43
End: 2018-08-22
Payer: COMMERCIAL

## 2018-08-22 ENCOUNTER — HOSPITAL ENCOUNTER (OUTPATIENT)
Facility: HOSPITAL | Age: 43
Setting detail: OBSERVATION
Discharge: HOME | End: 2018-08-23
Attending: EMERGENCY MEDICINE | Admitting: INTERNAL MEDICINE
Payer: COMMERCIAL

## 2018-08-22 DIAGNOSIS — J40 BRONCHITIS: ICD-10-CM

## 2018-08-22 DIAGNOSIS — R07.9 CHEST PAIN: Primary | ICD-10-CM

## 2018-08-22 LAB
ALBUMIN SERPL BCP-MCNC: 4.4 G/DL (ref 3.5–5)
ALP SERPL-CCNC: 50 U/L (ref 46–116)
ALT SERPL W P-5'-P-CCNC: 80 U/L (ref 12–78)
ANION GAP SERPL CALCULATED.3IONS-SCNC: 7 MMOL/L (ref 4–13)
AST SERPL W P-5'-P-CCNC: 41 U/L (ref 5–45)
ATRIAL RATE: 88 BPM
BASOPHILS # BLD AUTO: 0.11 THOUSANDS/ΜL (ref 0–0.1)
BASOPHILS NFR BLD AUTO: 1 % (ref 0–1)
BILIRUB SERPL-MCNC: 0.7 MG/DL (ref 0.2–1)
BUN SERPL-MCNC: 17 MG/DL (ref 5–25)
CALCIUM SERPL-MCNC: 9.2 MG/DL (ref 8.3–10.1)
CHLORIDE SERPL-SCNC: 104 MMOL/L (ref 100–108)
CO2 SERPL-SCNC: 27 MMOL/L (ref 21–32)
CREAT SERPL-MCNC: 1.1 MG/DL (ref 0.6–1.3)
EOSINOPHIL # BLD AUTO: 0.26 THOUSAND/ΜL (ref 0–0.61)
EOSINOPHIL NFR BLD AUTO: 3 % (ref 0–6)
ERYTHROCYTE [DISTWIDTH] IN BLOOD BY AUTOMATED COUNT: 12.8 % (ref 11.6–15.1)
GFR SERPL CREATININE-BSD FRML MDRD: 81 ML/MIN/1.73SQ M
GLUCOSE SERPL-MCNC: 100 MG/DL (ref 65–140)
HCT VFR BLD AUTO: 51.3 % (ref 36.5–49.3)
HGB BLD-MCNC: 17.1 G/DL (ref 12–17)
IMM GRANULOCYTES # BLD AUTO: 0.02 THOUSAND/UL (ref 0–0.2)
IMM GRANULOCYTES NFR BLD AUTO: 0 % (ref 0–2)
LYMPHOCYTES # BLD AUTO: 2.04 THOUSANDS/ΜL (ref 0.6–4.47)
LYMPHOCYTES NFR BLD AUTO: 20 % (ref 14–44)
MCH RBC QN AUTO: 29.6 PG (ref 26.8–34.3)
MCHC RBC AUTO-ENTMCNC: 33.3 G/DL (ref 31.4–37.4)
MCV RBC AUTO: 89 FL (ref 82–98)
MONOCYTES # BLD AUTO: 1.03 THOUSAND/ΜL (ref 0.17–1.22)
MONOCYTES NFR BLD AUTO: 10 % (ref 4–12)
NEUTROPHILS # BLD AUTO: 6.53 THOUSANDS/ΜL (ref 1.85–7.62)
NEUTS SEG NFR BLD AUTO: 66 % (ref 43–75)
NRBC BLD AUTO-RTO: 0 /100 WBCS
NT-PROBNP SERPL-MCNC: 28 PG/ML
P AXIS: 50 DEGREES
PLATELET # BLD AUTO: 284 THOUSANDS/UL (ref 149–390)
PMV BLD AUTO: 12 FL (ref 8.9–12.7)
POTASSIUM SERPL-SCNC: 4 MMOL/L (ref 3.5–5.3)
PR INTERVAL: 154 MS
PROT SERPL-MCNC: 8 G/DL (ref 6.4–8.2)
QRS AXIS: 75 DEGREES
QRSD INTERVAL: 96 MS
QT INTERVAL: 386 MS
QTC INTERVAL: 467 MS
RBC # BLD AUTO: 5.77 MILLION/UL (ref 3.88–5.62)
SODIUM SERPL-SCNC: 138 MMOL/L (ref 136–145)
T WAVE AXIS: 44 DEGREES
TROPONIN I SERPL-MCNC: <0.02 NG/ML
TROPONIN I SERPL-MCNC: <0.02 NG/ML
VENTRICULAR RATE: 88 BPM
WBC # BLD AUTO: 9.99 THOUSAND/UL (ref 4.31–10.16)

## 2018-08-22 PROCEDURE — 94640 AIRWAY INHALATION TREATMENT: CPT

## 2018-08-22 PROCEDURE — 80053 COMPREHEN METABOLIC PANEL: CPT | Performed by: EMERGENCY MEDICINE

## 2018-08-22 PROCEDURE — 93010 ELECTROCARDIOGRAM REPORT: CPT | Performed by: INTERNAL MEDICINE

## 2018-08-22 PROCEDURE — 36415 COLL VENOUS BLD VENIPUNCTURE: CPT | Performed by: EMERGENCY MEDICINE

## 2018-08-22 PROCEDURE — 84484 ASSAY OF TROPONIN QUANT: CPT | Performed by: EMERGENCY MEDICINE

## 2018-08-22 PROCEDURE — 93005 ELECTROCARDIOGRAM TRACING: CPT

## 2018-08-22 PROCEDURE — 99285 EMERGENCY DEPT VISIT HI MDM: CPT

## 2018-08-22 PROCEDURE — 96374 THER/PROPH/DIAG INJ IV PUSH: CPT

## 2018-08-22 PROCEDURE — 96361 HYDRATE IV INFUSION ADD-ON: CPT

## 2018-08-22 PROCEDURE — 83880 ASSAY OF NATRIURETIC PEPTIDE: CPT | Performed by: EMERGENCY MEDICINE

## 2018-08-22 PROCEDURE — 71046 X-RAY EXAM CHEST 2 VIEWS: CPT

## 2018-08-22 PROCEDURE — 85025 COMPLETE CBC W/AUTO DIFF WBC: CPT | Performed by: EMERGENCY MEDICINE

## 2018-08-22 RX ORDER — ASPIRIN 81 MG/1
81 TABLET, CHEWABLE ORAL ONCE
Status: COMPLETED | OUTPATIENT
Start: 2018-08-22 | End: 2018-08-22

## 2018-08-22 RX ORDER — ALBUTEROL SULFATE 2.5 MG/3ML
5 SOLUTION RESPIRATORY (INHALATION) ONCE
Status: COMPLETED | OUTPATIENT
Start: 2018-08-22 | End: 2018-08-22

## 2018-08-22 RX ORDER — ALBUTEROL SULFATE 90 UG/1
2 AEROSOL, METERED RESPIRATORY (INHALATION) ONCE
Status: COMPLETED | OUTPATIENT
Start: 2018-08-22 | End: 2018-08-22

## 2018-08-22 RX ORDER — MORPHINE SULFATE 4 MG/ML
4 INJECTION, SOLUTION INTRAMUSCULAR; INTRAVENOUS ONCE
Status: COMPLETED | OUTPATIENT
Start: 2018-08-22 | End: 2018-08-22

## 2018-08-22 RX ADMIN — ALBUTEROL SULFATE 5 MG: 2.5 SOLUTION RESPIRATORY (INHALATION) at 19:39

## 2018-08-22 RX ADMIN — MORPHINE SULFATE 4 MG: 4 INJECTION INTRAVENOUS at 21:49

## 2018-08-22 RX ADMIN — ALBUTEROL SULFATE 5 MG: 2.5 SOLUTION RESPIRATORY (INHALATION) at 21:49

## 2018-08-22 RX ADMIN — ASPIRIN 81 MG CHEWABLE TABLET 81 MG: 81 TABLET CHEWABLE at 23:21

## 2018-08-22 RX ADMIN — DEXAMETHASONE SODIUM PHOSPHATE 10 MG: 10 INJECTION, SOLUTION INTRAMUSCULAR; INTRAVENOUS at 21:53

## 2018-08-22 RX ADMIN — IPRATROPIUM BROMIDE 0.5 MG: 0.5 SOLUTION RESPIRATORY (INHALATION) at 21:49

## 2018-08-22 RX ADMIN — ALBUTEROL SULFATE 2 PUFF: 90 AEROSOL, METERED RESPIRATORY (INHALATION) at 21:48

## 2018-08-22 RX ADMIN — NITROGLYCERIN 0.5 INCH: 20 OINTMENT TOPICAL at 23:21

## 2018-08-22 RX ADMIN — IPRATROPIUM BROMIDE 0.5 MG: 0.5 SOLUTION RESPIRATORY (INHALATION) at 19:39

## 2018-08-22 RX ADMIN — SODIUM CHLORIDE 1000 ML: 0.9 INJECTION, SOLUTION INTRAVENOUS at 20:58

## 2018-08-22 NOTE — ED PROVIDER NOTES
History  Chief Complaint   Patient presents with    Chest Pain     pt with substernal chest pain and SOB for a few days      48-year-old male presents for evaluation treatment of a substernal chest pain associated with shortness of breath  He states that this pain has been worsening over the past few days  Pain is exacerbated by exercise, alleviated by rest   He denies fevers or chills  He denies cough, denies pain on deep inhalation  He denies trauma to the chest   He has no history of cardiac disease in the past   He does have a past medical history of psoriatic arthritis  Prior to Admission Medications   Prescriptions Last Dose Informant Patient Reported? Taking?    Adalimumab (HUMIRA PEN) 40 MG/0 8ML PNKT   Yes No   Sig: Inject 40 mg under the skin   Azelaic Acid (FINACEA) 15 % cream   Yes No   Sig: As needed   Dermatological Products, Misc  (ELETONE) CREA   Yes No   Sig: As needed   HYDROcodone-acetaminophen (NORCO) 5-325 mg per tablet Unknown at Unknown time  No No   Sig: Take 1 tablet by mouth every 6 (six) hours as needed for moderate pain (Not relieved by Anti-inflammatory) Max Daily Amount: 4 tablets   Multiple Vitamin (MULTIVITAMIN) tablet   Yes No   Sig: Take 1 tablet by mouth daily   TURMERIC PO Unknown at Unknown time  Yes No   Sig: Take by mouth   Triamcinolone Acetonide 55 MCG/ACT AERO Unknown at Unknown time  Yes No   Si sprays each nare as needed   gabapentin (NEURONTIN) 100 mg capsule 2018 at Unknown time  Yes Yes   Sig: Take 100 mg by mouth 3 (three) times a day     hydroxychloroquine (PLAQUENIL) 200 mg tablet   Yes Yes   Sig: Take 400 mg by mouth daily at bedtime   melatonin 1 mg   Yes No   Sig: Take 1 mg by mouth daily at bedtime   metroNIDAZOLE (METROGEL) 1 % gel   Yes No   Sig: As needed   naproxen (NAPROSYN) 500 mg tablet   Yes No   Sig: Take by mouth   naproxen (NAPROSYN) 500 mg tablet   No No   Sig: Take 1 tablet by mouth every 12 (twelve) hours as needed for mild pain or moderate pain   ondansetron (ZOFRAN-ODT) 4 mg disintegrating tablet   No No   Sig: Take 1 tablet by mouth every 8 (eight) hours as needed for nausea or vomiting for up to 3 days   tamsulosin (FLOMAX) 0 4 mg   No No   Sig: Take 1 capsule by mouth daily with dinner for 5 days      Facility-Administered Medications: None       Past Medical History:   Diagnosis Date    Arthritis     Hemorrhoids, complicated 6/46/1253    Kidney stones     Migraine        Past Surgical History:   Procedure Laterality Date    HERNIA REPAIR      KIDNEY STONE SURGERY      NH COLONOSCOPY FLX DX W/COLLJ SPEC WHEN PFRMD N/A 3/10/2017    Procedure: COLONOSCOPY;  Surgeon: Kayla Meraz MD;  Location: [facility]; Service: Colorectal    ULNAR NERVE REPAIR         History reviewed  No pertinent family history  I have reviewed and agree with the history as documented  Social History   Substance Use Topics    Smoking status: Never Smoker    Smokeless tobacco: Never Used    Alcohol use No        Review of Systems   Constitutional: Positive for diaphoresis  Negative for chills, fatigue and fever  HENT: Negative for congestion and sore throat  Respiratory: Positive for shortness of breath  Negative for apnea, cough, chest tightness and wheezing  Cardiovascular: Positive for chest pain  Negative for palpitations and leg swelling  Gastrointestinal: Negative for abdominal pain, constipation, diarrhea, nausea and vomiting  Genitourinary: Negative for dysuria and flank pain  Musculoskeletal: Negative for back pain and neck pain  Skin: Negative for color change, pallor and rash  Allergic/Immunologic: Negative for immunocompromised state  Neurological: Negative for dizziness, syncope, weakness and headaches  Psychiatric/Behavioral: Negative for confusion  Physical Exam  Physical Exam   Constitutional: He is oriented to person, place, and time  He appears well-developed and well-nourished  No distress  HENT:   Head: Normocephalic and atraumatic  Mouth/Throat: Oropharynx is clear and moist    Eyes: Conjunctivae and EOM are normal  Pupils are equal, round, and reactive to light  Right eye exhibits no discharge  Left eye exhibits no discharge  No scleral icterus  Neck: Normal range of motion  Neck supple  No JVD present  Cardiovascular: Normal rate, regular rhythm, normal heart sounds and intact distal pulses  Exam reveals no gallop and no friction rub  No murmur heard  Pulmonary/Chest: Effort normal and breath sounds normal  No respiratory distress  He has no wheezes  He has no rales  He exhibits no tenderness  Abdominal: Soft  Bowel sounds are normal  He exhibits no distension  There is no tenderness  There is no rebound and no guarding  Musculoskeletal: Normal range of motion  He exhibits no edema, tenderness or deformity  Neurological: He is alert and oriented to person, place, and time  No cranial nerve deficit  Skin: Skin is warm and dry  No rash noted  He is not diaphoretic  No erythema  No pallor  Psychiatric: He has a normal mood and affect  His behavior is normal    Vitals reviewed        Vital Signs  ED Triage Vitals [08/22/18 1858]   Temperature Pulse Respirations Blood Pressure SpO2   98 3 °F (36 8 °C) 93 16 (!) 185/87 98 %      Temp Source Heart Rate Source Patient Position - Orthostatic VS BP Location FiO2 (%)   Oral Monitor Sitting Left arm --      Pain Score       7           Vitals:    08/23/18 0330 08/23/18 0700 08/23/18 1100 08/23/18 1601   BP: 115/59 143/69 142/62 145/75   Pulse: 87 84 80 101   Patient Position - Orthostatic VS: Lying Lying Lying Lying       Visual Acuity  Visual Acuity      Most Recent Value   L Pupil Size (mm)  3   R Pupil Size (mm)  3   L Pupil Shape  Round   R Pupil Shape  Round          ED Medications  Medications   albuterol inhalation solution 5 mg (5 mg Nebulization Given 8/22/18 1939)   ipratropium (ATROVENT) 0 02 % inhalation solution 0 5 mg (0 5 mg Nebulization Given 8/22/18 1939)   sodium chloride 0 9 % bolus 1,000 mL (0 mL Intravenous Stopped 8/22/18 2223)   dexamethasone 10 mg/mL oral liquid 10 mg 1 mL (10 mg Oral Given 8/22/18 2153)   albuterol inhalation solution 5 mg (5 mg Nebulization Given 8/22/18 2149)   ipratropium (ATROVENT) 0 02 % inhalation solution 0 5 mg (0 5 mg Nebulization Given 8/22/18 2149)   morphine (PF) 4 mg/mL injection 4 mg (4 mg Intravenous Given 8/22/18 2149)   albuterol (PROVENTIL HFA,VENTOLIN HFA) inhaler 2 puff (2 puffs Inhalation Given 8/22/18 2148)   nitroglycerin (NITRO-BID) 2 % TD ointment 0 5 inch (0 5 inches Topical Given 8/22/18 2321)   aspirin chewable tablet 81 mg (81 mg Oral Given 8/22/18 2321)   morphine (PF) 4 mg/mL injection 4 mg (4 mg Intravenous Given 8/23/18 0047)   regadenoson (LEXISCAN) injection 0 4 mg (0 4 mg Intravenous Given 8/23/18 1325)       Diagnostic Studies  Results Reviewed     Procedure Component Value Units Date/Time    Troponin I [[id number]]  (Normal) Collected:  08/22/18 2224    Lab Status:  Final result Specimen:  Blood from Arm, Left Updated:  08/22/18 2249     Troponin I <0 02 ng/mL     NT-BNP PRO [[id number]]  (Normal) Collected:  08/22/18 1925    Lab Status:  Final result Specimen:  Blood from Arm, Left Updated:  08/22/18 2005     NT-proBNP 28 pg/mL     Troponin I [[id number]]  (Normal) Collected:  08/22/18 1925    Lab Status:  Final result Specimen:  Blood from Arm, Left Updated:  08/22/18 1955     Troponin I <0 02 ng/mL     Comprehensive metabolic panel [[id number]]  (Abnormal) Collected:  08/22/18 1925    Lab Status:  Final result Specimen:  Blood from Arm, Left Updated:  08/22/18 1954     Sodium 138 mmol/L      Potassium 4 0 mmol/L      Chloride 104 mmol/L      CO2 27 mmol/L      Anion Gap 7 mmol/L      BUN 17 mg/dL      Creatinine 1 10 mg/dL      Glucose 100 mg/dL      Calcium 9 2 mg/dL      AST 41 U/L      ALT 80 (H) U/L      Alkaline Phosphatase 50 U/L      Total Protein 8 0 g/dL Albumin 4 4 g/dL      Total Bilirubin 0 70 mg/dL      eGFR 81 ml/min/1 73sq m     Narrative:         National Kidney Disease Education Program recommendations are as follows:  GFR calculation is accurate only with a steady state creatinine  Chronic Kidney disease less than 60 ml/min/1 73 sq  meters  Kidney failure less than 15 ml/min/1 73 sq  meters  CBC and differential [[id number]]  (Abnormal) Collected:  08/22/18 1925    Lab Status:  Final result Specimen:  Blood from Arm, Left Updated:  08/22/18 1940     WBC 9 99 Thousand/uL      RBC 5 77 (H) Million/uL      Hemoglobin 17 1 (H) g/dL      Hematocrit 51 3 (H) %      MCV 89 fL      MCH 29 6 pg      MCHC 33 3 g/dL      RDW 12 8 %      MPV 12 0 fL      Platelets 914 Thousands/uL      nRBC 0 /100 WBCs      Neutrophils Relative 66 %      Immat GRANS % 0 %      Lymphocytes Relative 20 %      Monocytes Relative 10 %      Eosinophils Relative 3 %      Basophils Relative 1 %      Neutrophils Absolute 6 53 Thousands/µL      Immature Grans Absolute 0 02 Thousand/uL      Lymphocytes Absolute 2 04 Thousands/µL      Monocytes Absolute 1 03 Thousand/µL      Eosinophils Absolute 0 26 Thousand/µL      Basophils Absolute 0 11 (H) Thousands/µL                  X-ray chest 2 views   ED Interpretation by Collin Braden DO (08/22 2029)   No acute cardiopulmonary abnormalities      Final Result by Sophie Rahman MD (08/22 2100)      No acute cardiopulmonary disease              Workstation performed: [id number]                    Procedures  Procedures       Phone Contacts  ED Phone Contact    ED Course  ED Course as of Aug 24 0657   Wed Aug 22, 2018   1955 Dehydration, will give IV fluid    2028 NT-proBNP: 28         HEART Risk Score      Most Recent Value   History  1 Filed at: 08/22/2018 2118   ECG  0 Filed at: 08/22/2018 2118   Age  0 Filed at: 08/22/2018 2118   Risk Factors  1 Filed at: 08/22/2018 2118   Troponin  0 Filed at: 08/22/2018 2118   Heart Score Risk Calculator History  1 Filed at: 08/22/2018 2118   ECG  0 Filed at: 08/22/2018 2118   Age  0 Filed at: 08/22/2018 2118   Risk Factors  1 Filed at: 08/22/2018 2118   Troponin  0 Filed at: 08/22/2018 2118   HEART Score  2 Filed at: 08/22/2018 2118   HEART Score  2 Filed at: 08/22/2018 2118                            MDM  Number of Diagnoses or Management Options  Bronchitis:   Chest pain:   Diagnosis management comments: Patient has chest pain and shortness of breath  This is likely bronchitis  ACS workup is performed and is concerning for abnormal T-waves  Patient will be admitted for continued cardiac observation and to be evaluated by the cardiology team        Amount and/or Complexity of Data Reviewed  Clinical lab tests: ordered and reviewed  Tests in the radiology section of CPT®: ordered and reviewed      CritCare Time    Disposition  Final diagnoses:   Chest pain   Bronchitis     Time reflects when diagnosis was documented in both MDM as applicable and the Disposition within this note     Time User Action Codes Description Comment    8/22/2018  9:18 PM Sarah Mast Add [R07 9] Chest pain     8/22/2018  9:18 PM Kezia, [address] Bronchitis       ED Disposition     ED Disposition Condition Comment    Admit  Case was discussed with Newton (SLIM AP) and the patient's admission status was agreed to be Admission Status: observation status to the service of Dr Papa Dyson           Follow-up Information     Follow up With Specialties Details Why Contact Info Additional Information    [address] [facility] Emergency Medicine Follow up If symptoms worsen: worsening chest pain, shortness of breath, fever/chills, etc [facility] Gabby Granger [facility] [address] [phone] [facility], [address], [place], [address]    Sea Callahan MD  Schedule an appointment as soon as possible for a visit for follow up to ensure improvement and for stone testign if needed [address]       Doretha Simms MD Cardiology Schedule an appointment as soon as possible for a visit in 2 week(s)  [address] [place] [address]  [phone]             Discharge Medication List as of 8/23/2018  5:44 PM      CONTINUE these medications which have CHANGED    Details   metoprolol tartrate (LOPRESSOR) 25 mg tablet Take 1 tablet (25 mg total) by mouth every 12 (twelve) hours for 10 days, Starting Thu 8/23/2018, Until Sun 9/2/2018, Normal         CONTINUE these medications which have NOT CHANGED    Details   gabapentin (NEURONTIN) 100 mg capsule Take 100 mg by mouth 3 (three) times a day  , Starting 8/31/2016, Until Discontinued, Historical Med      hydroxychloroquine (PLAQUENIL) 200 mg tablet Take 400 mg by mouth daily at bedtime, Historical Med      Adalimumab (HUMIRA PEN) 40 MG/0 8ML PNKT Inject 40 mg under the skin, Starting 8/31/2016, Until Discontinued, Historical Med      Azelaic Acid (FINACEA) 15 % cream As needed, Historical Med      Dermatological Products, Misc  (ELETONE) CREA As needed, Historical Med      HYDROcodone-acetaminophen ([facility]) 5-325 mg per tablet Take 1 tablet by mouth every 6 (six) hours as needed for moderate pain (Not relieved by Anti-inflammatory) Max Daily Amount: 4 tablets, Starting 10/10/2016, Until Discontinued, Print      melatonin 1 mg Take 1 mg by mouth daily at bedtime, Until Discontinued, Historical Med      metroNIDAZOLE (METROGEL) 1 % gel As needed, Historical Med      Multiple Vitamin (MULTIVITAMIN) tablet Take 1 tablet by mouth daily, Until Discontinued, Historical Med      !! naproxen (NAPROSYN) 500 mg tablet Take by mouth, Starting 5/31/2012, Until Discontinued, Historical Med      !! naproxen (NAPROSYN) 500 mg tablet Take 1 tablet by mouth every 12 (twelve) hours as needed for mild pain or moderate pain, Starting Mon 11/13/2017, Print      ondansetron (ZOFRAN-ODT) 4 mg disintegrating tablet Take 1 tablet by mouth every 8 (eight) hours as needed for nausea or vomiting for up to 3 days, Starting 10/10/2016, Until Thu 10/13/16, Print      tamsulosin (FLOMAX) 0 4 mg Take 1 capsule by mouth daily with dinner for 5 days, Starting 10/10/2016, Until Sat 10/15/16, Print      Triamcinolone Acetonide 55 MCG/ACT AERO 2 sprays each nare as needed, Historical Med      TURMERIC PO Take by mouth, Until Discontinued, Historical Med       !! - Potential duplicate medications found  Please discuss with provider  Outpatient Discharge Orders  Discharge Diet     Activity as tolerated     Stress test only, exercise   Standing Status: Future  Standing Exp   Date: 08/22/22         ED Provider  Electronically Signed by           She Ralph DO  08/24/18 0727

## 2018-08-23 ENCOUNTER — APPOINTMENT (OUTPATIENT)
Dept: NUCLEAR MEDICINE | Facility: HOSPITAL | Age: 43
End: 2018-08-23
Payer: COMMERCIAL

## 2018-08-23 ENCOUNTER — APPOINTMENT (OUTPATIENT)
Dept: NON INVASIVE DIAGNOSTICS | Facility: HOSPITAL | Age: 43
End: 2018-08-23
Payer: COMMERCIAL

## 2018-08-23 VITALS
HEIGHT: 72 IN | WEIGHT: 239.86 LBS | SYSTOLIC BLOOD PRESSURE: 145 MMHG | BODY MASS INDEX: 32.49 KG/M2 | DIASTOLIC BLOOD PRESSURE: 75 MMHG | RESPIRATION RATE: 18 BRPM | TEMPERATURE: 98.5 F | OXYGEN SATURATION: 96 % | HEART RATE: 101 BPM

## 2018-08-23 PROBLEM — R07.9 CHEST PAIN: Status: ACTIVE | Noted: 2018-08-23

## 2018-08-23 LAB
ANION GAP SERPL CALCULATED.3IONS-SCNC: 9 MMOL/L (ref 4–13)
ARRHY DURING EX: NORMAL
ATRIAL RATE: 80 BPM
BASOPHILS # BLD AUTO: 0.07 THOUSANDS/ΜL (ref 0–0.1)
BASOPHILS NFR BLD AUTO: 1 % (ref 0–1)
BUN SERPL-MCNC: 16 MG/DL (ref 5–25)
CALCIUM SERPL-MCNC: 8.8 MG/DL (ref 8.3–10.1)
CHEST PAIN STATEMENT: NORMAL
CHLORIDE SERPL-SCNC: 104 MMOL/L (ref 100–108)
CHOLEST SERPL-MCNC: 178 MG/DL (ref 50–200)
CO2 SERPL-SCNC: 24 MMOL/L (ref 21–32)
CREAT SERPL-MCNC: 1.18 MG/DL (ref 0.6–1.3)
DEPRECATED D DIMER PPP: <270 NG/ML (FEU) (ref 0–424)
EOSINOPHIL # BLD AUTO: 0.01 THOUSAND/ΜL (ref 0–0.61)
EOSINOPHIL NFR BLD AUTO: 0 % (ref 0–6)
ERYTHROCYTE [DISTWIDTH] IN BLOOD BY AUTOMATED COUNT: 13 % (ref 11.6–15.1)
GFR SERPL CREATININE-BSD FRML MDRD: 75 ML/MIN/1.73SQ M
GLUCOSE P FAST SERPL-MCNC: 163 MG/DL (ref 65–99)
GLUCOSE SERPL-MCNC: 163 MG/DL (ref 65–140)
HCT VFR BLD AUTO: 47.7 % (ref 36.5–49.3)
HDLC SERPL-MCNC: 42 MG/DL (ref 40–60)
HGB BLD-MCNC: 15.8 G/DL (ref 12–17)
IMM GRANULOCYTES # BLD AUTO: 0.05 THOUSAND/UL (ref 0–0.2)
IMM GRANULOCYTES NFR BLD AUTO: 0 % (ref 0–2)
LDLC SERPL CALC-MCNC: 119 MG/DL (ref 0–100)
LYMPHOCYTES # BLD AUTO: 0.73 THOUSANDS/ΜL (ref 0.6–4.47)
LYMPHOCYTES NFR BLD AUTO: 6 % (ref 14–44)
MAGNESIUM SERPL-MCNC: 2 MG/DL (ref 1.6–2.6)
MAX DIASTOLIC BP: 72 MMHG
MAX HEART RATE: 112 BPM
MAX PREDICTED HEART RATE: 176 BPM
MAX. SYSTOLIC BP: 160 MMHG
MCH RBC QN AUTO: 29.7 PG (ref 26.8–34.3)
MCHC RBC AUTO-ENTMCNC: 33.1 G/DL (ref 31.4–37.4)
MCV RBC AUTO: 90 FL (ref 82–98)
MONOCYTES # BLD AUTO: 0.13 THOUSAND/ΜL (ref 0.17–1.22)
MONOCYTES NFR BLD AUTO: 1 % (ref 4–12)
NEUTROPHILS # BLD AUTO: 10.36 THOUSANDS/ΜL (ref 1.85–7.62)
NEUTS SEG NFR BLD AUTO: 92 % (ref 43–75)
NRBC BLD AUTO-RTO: 0 /100 WBCS
P AXIS: 50 DEGREES
PLATELET # BLD AUTO: 248 THOUSANDS/UL (ref 149–390)
PMV BLD AUTO: 11.7 FL (ref 8.9–12.7)
POTASSIUM SERPL-SCNC: 4.1 MMOL/L (ref 3.5–5.3)
PR INTERVAL: 156 MS
PROTOCOL NAME: NORMAL
QRS AXIS: 63 DEGREES
QRSD INTERVAL: 90 MS
QT INTERVAL: 412 MS
QTC INTERVAL: 475 MS
RBC # BLD AUTO: 5.32 MILLION/UL (ref 3.88–5.62)
REASON FOR TERMINATION: NORMAL
SODIUM SERPL-SCNC: 137 MMOL/L (ref 136–145)
T WAVE AXIS: 23 DEGREES
TARGET HR FORMULA: NORMAL
TEST INDICATION: NORMAL
TIME IN EXERCISE PHASE: NORMAL
TRIGL SERPL-MCNC: 84 MG/DL
TROPONIN I SERPL-MCNC: <0.02 NG/ML
TROPONIN I SERPL-MCNC: <0.02 NG/ML
VENTRICULAR RATE: 80 BPM
WBC # BLD AUTO: 11.35 THOUSAND/UL (ref 4.31–10.16)

## 2018-08-23 PROCEDURE — 93010 ELECTROCARDIOGRAM REPORT: CPT | Performed by: INTERNAL MEDICINE

## 2018-08-23 PROCEDURE — 93017 CV STRESS TEST TRACING ONLY: CPT

## 2018-08-23 PROCEDURE — 85379 FIBRIN DEGRADATION QUANT: CPT | Performed by: NURSE PRACTITIONER

## 2018-08-23 PROCEDURE — 93018 CV STRESS TEST I&R ONLY: CPT | Performed by: INTERNAL MEDICINE

## 2018-08-23 PROCEDURE — 80061 LIPID PANEL: CPT | Performed by: NURSE PRACTITIONER

## 2018-08-23 PROCEDURE — 99244 OFF/OP CNSLTJ NEW/EST MOD 40: CPT | Performed by: INTERNAL MEDICINE

## 2018-08-23 PROCEDURE — 80048 BASIC METABOLIC PNL TOTAL CA: CPT | Performed by: NURSE PRACTITIONER

## 2018-08-23 PROCEDURE — 78452 HT MUSCLE IMAGE SPECT MULT: CPT | Performed by: INTERNAL MEDICINE

## 2018-08-23 PROCEDURE — A9502 TC99M TETROFOSMIN: HCPCS

## 2018-08-23 PROCEDURE — 99220 PR INITIAL OBSERVATION CARE/DAY 70 MINUTES: CPT | Performed by: INTERNAL MEDICINE

## 2018-08-23 PROCEDURE — RECHECK: Performed by: INTERNAL MEDICINE

## 2018-08-23 PROCEDURE — 93306 TTE W/DOPPLER COMPLETE: CPT | Performed by: INTERNAL MEDICINE

## 2018-08-23 PROCEDURE — 85025 COMPLETE CBC W/AUTO DIFF WBC: CPT | Performed by: NURSE PRACTITIONER

## 2018-08-23 PROCEDURE — 78452 HT MUSCLE IMAGE SPECT MULT: CPT

## 2018-08-23 PROCEDURE — 93016 CV STRESS TEST SUPVJ ONLY: CPT | Performed by: INTERNAL MEDICINE

## 2018-08-23 PROCEDURE — 84484 ASSAY OF TROPONIN QUANT: CPT | Performed by: NURSE PRACTITIONER

## 2018-08-23 PROCEDURE — 93306 TTE W/DOPPLER COMPLETE: CPT

## 2018-08-23 PROCEDURE — 83735 ASSAY OF MAGNESIUM: CPT | Performed by: NURSE PRACTITIONER

## 2018-08-23 RX ORDER — ACETAMINOPHEN 325 MG/1
650 TABLET ORAL EVERY 4 HOURS PRN
Status: DISCONTINUED | OUTPATIENT
Start: 2018-08-23 | End: 2018-08-23 | Stop reason: HOSPADM

## 2018-08-23 RX ORDER — GABAPENTIN 100 MG/1
100 CAPSULE ORAL 3 TIMES DAILY
Status: DISCONTINUED | OUTPATIENT
Start: 2018-08-23 | End: 2018-08-23

## 2018-08-23 RX ORDER — ONDANSETRON 2 MG/ML
4 INJECTION INTRAMUSCULAR; INTRAVENOUS EVERY 6 HOURS PRN
Status: DISCONTINUED | OUTPATIENT
Start: 2018-08-23 | End: 2018-08-23 | Stop reason: HOSPADM

## 2018-08-23 RX ORDER — HYDROXYCHLOROQUINE SULFATE 200 MG/1
400 TABLET, FILM COATED ORAL
COMMUNITY
End: 2022-05-27

## 2018-08-23 RX ORDER — GABAPENTIN 100 MG/1
200 CAPSULE ORAL 2 TIMES DAILY
Status: DISCONTINUED | OUTPATIENT
Start: 2018-08-23 | End: 2018-08-23 | Stop reason: HOSPADM

## 2018-08-23 RX ORDER — MORPHINE SULFATE 4 MG/ML
4 INJECTION, SOLUTION INTRAMUSCULAR; INTRAVENOUS ONCE
Status: COMPLETED | OUTPATIENT
Start: 2018-08-23 | End: 2018-08-23

## 2018-08-23 RX ORDER — TAMSULOSIN HYDROCHLORIDE 0.4 MG/1
0.4 CAPSULE ORAL
Status: DISCONTINUED | OUTPATIENT
Start: 2018-08-23 | End: 2018-08-23

## 2018-08-23 RX ORDER — LANOLIN ALCOHOL/MO/W.PET/CERES
3 CREAM (GRAM) TOPICAL
Status: DISCONTINUED | OUTPATIENT
Start: 2018-08-23 | End: 2018-08-23 | Stop reason: HOSPADM

## 2018-08-23 RX ADMIN — Medication 1 TABLET: at 09:26

## 2018-08-23 RX ADMIN — GABAPENTIN 200 MG: 100 CAPSULE ORAL at 09:26

## 2018-08-23 RX ADMIN — REGADENOSON 0.4 MG: 0.08 INJECTION, SOLUTION INTRAVENOUS at 13:25

## 2018-08-23 RX ADMIN — MELATONIN TAB 3 MG 3 MG: 3 TAB at 00:47

## 2018-08-23 RX ADMIN — MORPHINE SULFATE 4 MG: 4 INJECTION INTRAVENOUS at 00:47

## 2018-08-23 RX ADMIN — METOPROLOL TARTRATE 25 MG: 25 TABLET ORAL at 09:26

## 2018-08-23 RX ADMIN — METOPROLOL TARTRATE 25 MG: 25 TABLET ORAL at 00:47

## 2018-08-23 NOTE — ED CARE HANDOFF
Emergency Department Sign Out Note        Sign out and transfer of care from Dr Reymundo Agudelo  See Separate Emergency Department note  The patient, Allyssa Rodriges, was evaluated by the previous provider for chest pain  Workup Completed:  Ekg, labs, xray    ED Course / Workup Pending (followup): Initial plan to d/c home if 2nd troponin negative  I saw and examined pt  He reports recurrence of chest discomfort w diaphoresis  EKG with ? ST depression in lateral precordial leads, unchanged from initial EKG in ED   Given ongoing discomfort w family h/o CAD and diaphoresis with pain will assign to observation pending stress test        HEART Risk Score      Most Recent Value   History  1 Filed at: 08/22/2018 2118   ECG  0 Filed at: 08/22/2018 2118   Age  0 Filed at: 08/22/2018 2118   Risk Factors  1 Filed at: 08/22/2018 2118   Troponin  0 Filed at: 08/22/2018 2118   Heart Score Risk Calculator   History  1 Filed at: 08/22/2018 2118   ECG  0 Filed at: 08/22/2018 2118   Age  0 Filed at: 08/22/2018 2118   Risk Factors  1 Filed at: 08/22/2018 2118   Troponin  0 Filed at: 08/22/2018 2118   HEART Score  2 Filed at: 08/22/2018 2118   HEART Score  2 Filed at: 08/22/2018 2118                             Procedures  MDM  CritCare Time      Disposition  Final diagnoses:   Chest pain   Bronchitis     Time reflects when diagnosis was documented in both MDM as applicable and the Disposition within this note     Time User Action Codes Description Comment    8/22/2018  9:18 PM Coppersmith, Taco Leak Add [R07 9] Chest pain     8/22/2018  9:18 PM Coppersmith, Taco Leak Add [J40] Bronchitis       ED Disposition     None      Follow-up Information     Follow up With Specialties Details Why Contact Info Additional [address] [facility] Emergency Department Emergency Medicine  If symptoms worsen: worsening chest pain, shortness of breath, fever/chills, etc [address] [facility] [address] , [place], [place], [address] [facility], [place]  Schedule an appointment as soon as possible for a visit for follow up to ensure improvement and for helener testign if needed [address] [address] [facility] [address]  [phone]           Current Discharge Medication List      CONTINUE these medications which have NOT CHANGED    Details   gabapentin (NEURONTIN) 100 mg capsule Take 100 mg by mouth 3 (three) times a day        Adalimumab (HUMIRA PEN) 40 MG/0 8ML PNKT Inject 40 mg under the skin      Azelaic Acid (FINACEA) 15 % cream As needed      Dermatological Products, Misc  (ELETONE) CREA As needed      HYDROcodone-acetaminophen (NORCO) 5-325 mg per tablet Take 1 tablet by mouth every 6 (six) hours as needed for moderate pain (Not relieved by Anti-inflammatory) Max Daily Amount: 4 tablets  Qty: 20 tablet, Refills: 0      melatonin 1 mg Take 1 mg by mouth daily at bedtime      methocarbamol (ROBAXIN) 750 mg tablet Take 1 tablet by mouth every 8 (eight) hours as needed for muscle spasms  Qty: 12 tablet, Refills: 0      metroNIDAZOLE (METROGEL) 1 % gel As needed      Multiple Vitamin (MULTIVITAMIN) tablet Take 1 tablet by mouth daily      !! naproxen (NAPROSYN) 500 mg tablet Take by mouth      !! naproxen (NAPROSYN) 500 mg tablet Take 1 tablet by mouth every 12 (twelve) hours as needed for mild pain or moderate pain  Qty: 20 tablet, Refills: 0      ondansetron (ZOFRAN-ODT) 4 mg disintegrating tablet Take 1 tablet by mouth every 8 (eight) hours as needed for nausea or vomiting for up to 3 days  Qty: 9 tablet, Refills: 0      tamsulosin (FLOMAX) 0 4 mg Take 1 capsule by mouth daily with dinner for 5 days  Qty: 5 capsule, Refills: 0      Triamcinolone Acetonide 55 MCG/ACT AERO 2 sprays each nare as needed      TURMERIC PO Take by mouth       !! - Potential duplicate medications found  Please discuss with provider            Outpatient Discharge Orders  Stress test only, exercise   Standing Status: Future  Standing Exp   Date: 08/22/22            ED Provider  Electronically Signed by     Toshia Pack MD  08/23/18 5883

## 2018-08-23 NOTE — PLAN OF CARE

## 2018-08-23 NOTE — H&P
H&P- Eliane Reddy 1975, 39 y o  male MRN: [id number]    Unit/Bed#: -01 Encounter: [id number]    Primary Care Provider: Samantha Rapp MD   Date and time admitted to hospital: 8/22/2018  6:57 PM        * Chest pain   Assessment & Plan    Pre admission, associated with diaphoresis, dyspnea  Admit to rule out ACS  EKG abnormal   Troponin negative, will trend  Consult Cardiology for further management  Telemetry  Monitor associated risk factors  Echo in a m     Aspirin, beta-blocker  VTE Prophylaxis: Low risk  / sequential compression device   Code Status:  Full code  POLST: POLST form is not discussed and not completed at this time  Discussion with family:  Family at bedside, plan of care discussed with  Anticipated Length of Stay:  Patient will be admitted on an Observation basis with an anticipated length of stay of  less than 2 midnights  Justification for Hospital Stay:  Chest pain, cardiology consult    Total Time for Visit, including Counseling / Coordination of Care: 40   Greater than 50% of this total time spent on direct patient counseling and coordination of care  Chief Complaint:   Chest pain    History of Present Illness:    Eliane Reddy is a 39 y o  male history of obesity, psoriatic arthritis who presents with chief complaint of chest pain substernal associated with flush like feeling, diaphoresis  Denies shortness of breath, nausea , vomiting  Review of Systems:    Review of Systems   Constitutional: Positive for diaphoresis  Cardiovascular: Positive for chest pain and palpitations  Negative for leg swelling  Musculoskeletal: Positive for arthralgias  Neurological: Positive for dizziness  All other systems reviewed and are negative        Past Medical and Surgical History:     Past Medical History:   Diagnosis Date    Arthritis     Hemorrhoids, complicated 7/99/0443    Kidney stones     Migraine        Past Surgical History: Procedure Laterality Date    HERNIA REPAIR      KIDNEY STONE SURGERY      ID COLONOSCOPY FLX DX W/COLLJ SPEC WHEN PFRMD N/A 3/10/2017    Procedure: COLONOSCOPY;  Surgeon: Kimberli Hahn MD;  Location: [facility]; Service: Colorectal    ULNAR NERVE REPAIR         Meds/Allergies:    Prior to Admission medications    Medication Sig Start Date End Date Taking?  Authorizing Provider   gabapentin (NEURONTIN) 100 mg capsule Take 100 mg by mouth 3 (three) times a day   8/31/16  Yes Historical Provider, MD   Adalimumab (HUMIRA PEN) 40 MG/0 8ML PNKT Inject 40 mg under the skin 8/31/16   Historical Provider, MD   Azelaic Acid (FINACEA) 15 % cream As needed    Historical Provider, MD   [facility], [facility]  (Mila Lombardi) [address] [place] [address] As needed    Historical Provider, MD   HYDROcodone-acetaminophen ([facility]) 5-325 mg per tablet Take 1 tablet by mouth every 6 (six) hours as needed for moderate pain (Not relieved by Anti-inflammatory) Max Daily Amount: 4 tablets 10/10/16   Gerhardt Cree, CRNP   melatonin 1 mg Take 1 mg by mouth daily at bedtime    Historical Provider, MD   methocarbamol (ROBAXIN) 750 mg tablet Take 1 tablet by mouth every 8 (eight) hours as needed for muscle spasms 11/13/17   Esdras Marquez DO   metroNIDAZOLE (METROGEL) 1 % gel As needed    Historical Provider, MD   Multiple Vitamin (MULTIVITAMIN) tablet Take 1 tablet by mouth daily    Historical Provider, MD   naproxen (NAPROSYN) 500 mg tablet Take by mouth 5/31/12   Historical Provider, MD   naproxen (NAPROSYN) 500 mg tablet Take 1 tablet by mouth every 12 (twelve) hours as needed for mild pain or moderate pain 11/13/17   Teddy Merino,    ondansetron (ZOFRAN-ODT) 4 mg disintegrating tablet Take 1 tablet by mouth every 8 (eight) hours as needed for nausea or vomiting for up to 3 days 10/10/16 10/13/16  Gerhardt Cree, CRNP   tamsulosin [facility]) 0 4 mg Take 1 capsule by mouth daily with dinner for 5 days 10/10/16 10/15/16  Gerhardt Cree, CRNP   Triamcinolone Acetonide 55 MCG/ACT AERO 2 sprays each nare as needed    Historical Provider, MD   TURMERIC PO Take by mouth    Historical Provider, MD     I have reviewed home medications with patient personally  Allergies: Allergies   Allergen Reactions    Xyzal [Levocetirizine] Anaphylaxis and GI Intolerance    Etodolac Rash     Has tolerated other NSAIDs without reaction       Social History:     Marital Status: /Civil Union   Occupation:  Full time  Patient Pre-hospital Living Situation:  Home  Patient Pre-hospital Level of Mobility:  Independent  Patient Pre-hospital Diet Restrictions:  None  Substance Use History:   History   Alcohol Use No     History   Smoking Status    Never Smoker   Smokeless Tobacco    Never Used     History   Drug Use No       Family History:    non-contributory    Physical Exam:     Vitals:   Blood Pressure: 143/69 (08/23/18 0700)  Pulse: 84 (08/23/18 0700)  Temperature: 98 6 °F (37 °C) (08/23/18 0700)  Temp Source: Oral (08/23/18 0700)  Respirations: 18 (08/23/18 0700)  Height: 6' (182 9 cm) (08/22/18 2357)  Weight - Scale: 109 kg (239 lb 13 8 oz) (08/22/18 2357)  SpO2: 94 % (08/23/18 0700)    Physical Exam   Constitutional: He is oriented to person, place, and time  He appears well-developed and well-nourished  No distress  Obese   HENT:   Head: Normocephalic and atraumatic  Mouth/Throat: Oropharynx is clear and moist    Neck: Normal range of motion  Neck supple  No thyromegaly present  Cardiovascular: Normal rate, regular rhythm, normal heart sounds and intact distal pulses  No murmur heard  Chest pain resolved   Pulmonary/Chest: Effort normal and breath sounds normal  No respiratory distress  He has no wheezes  He exhibits no tenderness  Abdominal: Soft  Bowel sounds are normal  He exhibits no distension  There is no tenderness  Musculoskeletal: Normal range of motion  He exhibits no edema or tenderness  Lymphadenopathy:     He has no cervical adenopathy  Neurological: He is alert and oriented to person, place, and time  Skin: Skin is warm and dry  He is not diaphoretic  Psychiatric: He has a normal mood and affect  Nursing note and vitals reviewed  Additional Data:     Lab Results: I have personally reviewed pertinent reports  Results from last 7 days  Lab Units 08/23/18  0355   WBC Thousand/uL 11 35*   HEMOGLOBIN g/dL 15 8   HEMATOCRIT % 47 7   PLATELETS Thousands/uL 248   NEUTROS PCT % 92*   LYMPHS PCT % 6*   MONOS PCT % 1*   EOS PCT % 0       Results from last 7 days  Lab Units 08/23/18  0355 08/22/18  1925   SODIUM mmol/L 137 138   POTASSIUM mmol/L 4 1 4 0   CHLORIDE mmol/L 104 104   CO2 mmol/L 24 27   BUN mg/dL 16 17   CREATININE mg/dL 1 18 1 10   CALCIUM mg/dL 8 8 9 2   TOTAL PROTEIN g/dL  --  8 0   BILIRUBIN TOTAL mg/dL  --  0 70   ALK PHOS U/L  --  50   ALT U/L  --  80*   AST U/L  --  41   GLUCOSE RANDOM mg/dL 163* 100                   Imaging: I have personally reviewed pertinent reports  X-ray chest 2 views   ED Interpretation by Brigida Draper DO (08/22 2029)   No acute cardiopulmonary abnormalities      Final Result by Isabel Vogt MD (08/22 2100)      No acute cardiopulmonary disease  Workstation performed: [id number]             EKG, Pathology, and Other Studies Reviewed on Admission:   · EKG:  Normal sinus rhythm, nonspecific T wave abnormality  Allscripts / Epic Records Reviewed: Yes     ** Please Note: This note has been constructed using a voice recognition system   **

## 2018-08-23 NOTE — SOCIAL WORK
Cm met with patient at bedside, patient alert and oriented and accompanied by his wife and mother  Patient reports residing in a private home that is functional for him  Patient is completely independent with ADL's, no dme use, vna or str  No hx of MH/SA  Patient is employed and able to meet all needs of his job without any restrictions  Patient reports filling his prescriptions at  [facility] with no co-payment barriers and would like to follow up with Dr Marylene Flavors for outpatient follow up  Patient provided cm with consent to make a follow up appointment with PCP upon discharge and would like to follow up with cardiology as well  Cm reviewed patient obs letter, patient reports understanding and kept a copy for his records  No reported needs at this time  Cm contacted [facility] to make a follow up appointment with Dr Marylene Flavors, jonah informed a nurse would return cm's call with the appointment shortly  CM reviewed discharge planning process including the following: identifying help at home, patient preference for discharge planning needs, pharmacy preference, and availability of treatment team to discuss questions or concerns patient and/or family may have regarding understanding medications and recognizing signs and symptoms once discharged  CM also encouraged patient to follow up with all recommended appointments after discharge  Patient advised of importance for patient and family to participate in managing patients medical well being  CM name and role reviewed  Discharge Checklist reviewed and CM will continue to monitor for progress toward discharge goals in nursing and provider rounds

## 2018-08-23 NOTE — ASSESSMENT & PLAN NOTE
Pre admission, associated with diaphoresis, dyspnea  Admit to rule out ACS  EKG abnormal   Troponin negative, will trend  Consult Cardiology for further management  Telemetry  Monitor associated risk factors  Echo in a m     Aspirin, beta-blocker

## 2018-08-23 NOTE — DISCHARGE SUMMARY
Discharge Summary - [facility]    Patient Information: Alfonzo Kuhn 39 y o  male MRN: [id number]  Unit/Bed#: -01 Encounter: [id number]    Discharging Physician / Practitioner: Toby Tee MD  PCP: Elsy Ochoa MD  Admission Date: 8/22/2018  Discharge Date: 08/23/18    Disposition:     Home     Reason for Admission: chest pain    Discharge Diagnoses:     Principal Problem:    Chest pain  Resolved Problems:    * No resolved hospital problems  *      Consultations During Hospital Stay:  · cardiology    Procedures Performed:     · Nuclear stress test ejection fraction 70% no regional wall motion abnormalities  Normal study  Myocardial perfusion imaging was normal at rest and with stress  Left ventricular systolic function was normal     Significant Findings / Test Results:     · Echocardiogram ejection fraction 60% with no regional wall motion abnormalities    Incidental Findings:   · None    Test Results Pending at Discharge (will require follow up): · None     Outpatient Tests Requested:  · None    Complications:  None    Hospital Course:     Alfonzo Kuhn is a 39 y o  male patient with past medical history of psoriatic arthritis who originally presented to the hospital on 8/22/2018 due to substernal chest pain, feeling of flushing sensation  He does not have any prior history of coronary artery disease  EKG showed sinus rhythm with nonspecific T-wave abnormality  Troponins were negative  Patient was evaluated by Cardiology, later had nuclear stress test which was normal   Echocardiogram did show ejection fraction of 60% with no regional wall motion abnormalities  Patient's chest pain completely resolved  Patient was discussed about signs and symptoms of coronary artery disease, should symptoms recur should present to ER for further evaluation      Condition at Discharge: stable     Discharge Day Visit / Exam:     * Please refer to separate progress note for these details *    Discussion with Family:  Spouse at bedside      Discharge instructions/Information to patient and family:   See after visit summary for information provided to patient and family  Provisions for Follow-Up Care:  See after visit summary for information related to follow-up care and any pertinent home health orders  Planned Readmission:  None    Discharge Statement:  I spent 45 minutes discharging the patient  This time was spent on the day of discharge  I had direct contact with the patient on the day of discharge  Greater than 50% of the total time was spent examining patient, answering all patient questions, arranging and discussing plan of care with patient as well as directly providing post-discharge instructions  Additional time then spent on discharge activities  Discharge Medications:  See after visit summary for reconciled discharge medications provided to patient and family  ** Please Note: This note has been constructed using a voice recognition system   **

## 2018-08-23 NOTE — DISCHARGE INSTRUCTIONS
Acute Bronchitis   AMBULATORY CARE:   Acute bronchitis  is swelling and irritation in the air passages of your lungs  This irritation may cause you to cough or have other breathing problems  Acute bronchitis often starts because of another illness, such as a cold or the flu  The illness spreads from your nose and throat to your windpipe and airways  Bronchitis is often called a chest cold  Acute bronchitis lasts about 3 to 6 weeks and is usually not a serious illness  Your cough can last for several weeks  You may have any of the following symptoms:   · A cough with sputum that may be clear, yellow, or green    · Feeling more tired than usual, and body aches    · A fever and chills    · Wheezing when you breathe    · A tight chest or pain when you breathe or cough  Seek care immediately if:   · You cough up blood  · Your lips or fingernails turn blue  · You feel like you are not getting enough air when you breathe  Contact your healthcare provider if:   · You have a fever  · Your breathing problems do not go away or get worse  · Your cough does not get better within 4 weeks  · You have questions or concerns about your condition or care  Self-care:   · Get more rest   Rest helps your body to heal  Slowly start to do more each day  Rest when you feel it is needed  · Avoid irritants in the air  Avoid chemicals, fumes, and dust  Wear a face mask if you must work around dust or fumes  Stay inside on days when air pollution levels are high  If you have allergies, stay inside when pollen counts are high  Do not use aerosol products, such as spray-on deodorant, bug spray, and hair spray  · Do not smoke or be around others who smoke  Nicotine and other chemicals in cigarettes and cigars damages the cilia that move mucus out of your lungs  Ask your healthcare provider for information if you currently smoke and need help to quit  E-cigarettes or smokeless tobacco still contain nicotine   Talk to your healthcare provider before you use these products  · Drink liquids as directed  Liquids help keep your air passages moist and help you cough up mucus  You may need to drink more liquids when you have acute bronchitis  Ask how much liquid to drink each day and which liquids are best for you  · Use a humidifier or vaporizer  Use a cool mist humidifier or a vaporizer to increase air moisture in your home  This may make it easier for you to breathe and help decrease your cough  Prevent acute bronchitis by doing the following:   · Get the vaccinations you need  Ask your healthcare provider if you should get vaccinated against the flu or pneumonia  · Prevent the spread of germs  You can decrease your risk of acute bronchitis and other illnesses by doing the following:     [facility] your hands often with soap and water  Carry germ-killing hand lotion or gel with you  You can use the lotion or gel to clean your hands when soap and water are not available  ¨ Do not touch your eyes, nose, or mouth unless you have washed your hands first     ¨ Always cover your mouth when you cough to prevent the spread of germs  It is best to cough into a tissue or your shirt sleeve instead of into your hand  Ask those around you cover their mouths when they cough  ¨ Try to avoid people who have a cold or the flu  If you are sick, stay away from others as much as possible  Medicines: Your healthcare provider may  give you any of the following:  · Ibuprofen or acetaminophen  are medicines that help lower your fever  They are available without a doctor's order  Ask your healthcare provider which medicine is right for you  Ask how much to take and how often to take it  Follow directions  These medicines can cause stomach bleeding if not taken correctly  Ibuprofen can cause kidney damage  Do not take ibuprofen if you have kidney disease, an ulcer, or allergies to aspirin  Acetaminophen can cause liver damage   Do not take more than 4,000 milligrams in 24 hours  · Decongestants  help loosen mucus in your lungs and make it easier to cough up  This can help you breathe easier  · Cough suppressants  decrease your urge to cough  If your cough produces mucus, do not take a cough suppressant unless your healthcare provider tells you to  Your healthcare provider may suggest that you take a cough suppressant at night so you can rest     · Inhalers  may be given  Your healthcare provider may give you one or more inhalers to help you breathe easier and cough less  An inhaler gives your medicine to open your airways  Ask your healthcare provider to show you how to use your inhaler correctly  Follow up with your healthcare provider as directed:  Write down questions you have so you will remember to ask them during your follow-up visits  © 2017 [address] Information is for End User's use only and may not be sold, redistributed or otherwise used for commercial purposes  All illustrations and images included in CareNotes® are the copyrighted property of [employer] M [employer]  or [employer]  The above information is an  only  It is not intended as medical advice for individual conditions or treatments  Talk to your doctor, nurse or pharmacist before following any medical regimen to see if it is safe and effective for you  Chest Pain, Ambulatory Care   GENERAL INFORMATION:   Chest pain  can be caused by a range of conditions, from not serious to life-threatening  It may be caused by a heart attack or a blood clot in your lungs  Sometimes chest pain or pressure is caused by poor blood flow to your heart (angina)  Infection, inflammation, or a fracture in the bones or cartilage in your chest can cause pain or discomfort  Chest pain can also be a symptom of a digestive problem, such as acid reflux or a stomach ulcer     Common symptoms include the following:   · Fever or sweating     · Nausea or vomiting · Shortness of breath     · Discomfort or pressure that spreads from your chest to your back, jaw, or arm     · A racing or slow heartbeat     · Feeling weak, tired, or faint  Seek immediate care for the following symptoms:   · Any of the following signs of a heart attack:      ¨ Squeezing, pressure, or pain in your chest that lasts longer than 5 minutes or returns    ¨ Discomfort or pain in your back, neck, jaw, stomach, or arm     ¨ Trouble breathing     ¨ Nausea or vomiting    ¨ Lightheadedness or a sudden cold sweat, especially with trouble breathing         · Chest discomfort that gets worse, even with medicine    · Coughing or vomiting blood    · Black or bloody bowel movements     · Vomiting that does not stop, or pain when you swallow  Treatment for chest pain  may include medicine to treat your symptoms while he determines the cause of your chest pain  You may also need any of the following:  · Antiplatelets , such as aspirin, help prevent blood clots  Take your antiplatelet medicine exactly as directed  These medicines make it more likely for you to bleed or bruise  If you are told to take aspirin, do not take acetaminophen or ibuprofen instead  · Prescription pain medicine  may be given  Ask how to take this medicine safely  Do not smoke: If you smoke, it is never too late to quit  Smoking increases your risk for a heart attack and other heart and lung conditions  Ask your healthcare provider for information about how to stop smoking if you need help  Follow up with your healthcare provider as directed: You may need more tests  You may be referred to a specialist, such as a cardiologist or gastroenterologist  Write down your questions so you remember to ask them during your visits  CARE AGREEMENT:   You have the right to help plan your care  Learn about your health condition and how it may be treated  Discuss treatment options with your caregivers to decide what care you want to receive  You always have the right to refuse treatment  The above information is an  only  It is not intended as medical advice for individual conditions or treatments  Talk to your doctor, nurse or pharmacist before following any medical regimen to see if it is safe and effective for you  © 2014 [address] is for End User's use only and may not be sold, redistributed or otherwise used for commercial purposes  All illustrations and images included in CareNotes® are the copyrighted property of A D A M , Inc  or [employer]

## 2018-08-23 NOTE — CASE MANAGEMENT
Initial Clinical Review    Admission: Date/Time/Statement: OBS   8/22  2314    Orders Placed This Encounter   Procedures    Place in Observation (expected length of stay for this patient is less than two midnights)     Standing Status:   Standing     Number of Occurrences:   1     Order Specific Question:   Admitting Physician     Answer:   Jeremy Chaidez [[id number]]     Order Specific Question:   Level of Care     Answer:   Med Surg [16]         ED: Date/Time/Mode of Arrival:   ED Arrival Information     Expected Arrival Acuity Means of Arrival Escorted By Service Admission Type    - 8/22/2018 18:53 Emergent Walk-In Self General Medicine Emergency    Arrival Complaint    CHEST PAIN           Chief Complaint:   Chief Complaint   Patient presents with    Chest Pain     pt with substernal chest pain and SOB for a few days        History of Illness: Lulu Puentes is a 39 y o  male who presents with chief complaint of chest pain substernal associated with flush like feeling, diaphoresis       ED Vital Signs:   ED Triage Vitals [08/22/18 1858]   Temperature Pulse Respirations Blood Pressure SpO2   98 3 °F (36 8 °C) 93 16 (!) 185/87 98 %      Temp Source Heart Rate Source Patient Position - Orthostatic VS BP Location FiO2 (%)   Oral Monitor Sitting Left arm --      Pain Score       7        Wt Readings from Last 1 Encounters:   08/22/18 109 kg (239 lb 13 8 oz)       Vital Signs (abnormal): wnl     Abnormal Labs/Diagnostic Test Results: alt  80, H&H   17 1  51 3  CXR -wnl   EKG- NSR     ED Treatment:   Medication Administration from 08/22/2018 1853 to 08/22/2018 2355       Date/Time Order Dose Route Action Action by Comments     08/22/2018 1939 albuterol inhalation solution 5 mg 5 mg Nebulization Given Cale Andres RN      08/22/2018 1939 ipratropium (ATROVENT) 0 02 % inhalation solution 0 5 mg 0 5 mg Nebulization Given Cale Andres RN      08/22/2018 2223 sodium chloride 0 9 % bolus 1,000 mL 0 mL Intravenous Stopped Terry Orlando RN      08/22/2018 5754 sodium chloride 0 9 % bolus 1,000 mL 1,000 mL Intravenous [address] Terry Orlando, [address] [facility]      08/22/2018 2153 dexamethasone 10 mg/mL oral liquid 10 mg 1 mL 10 mg Oral Given Terry Orlando RN      08/22/2018 2149 albuterol inhalation solution 5 mg 5 mg Nebulization Given Terry Orlando RN      08/22/2018 2149 ipratropium (ATROVENT) 0 02 % inhalation solution 0 5 mg 0 5 mg Nebulization Given Terry Orlando RN      08/22/2018 2149 morphine (PF) 4 mg/mL injection 4 mg 4 mg Intravenous Given Terry Orlando RN      08/22/2018 2148 albuterol (PROVENTIL HFA,VENTOLIN HFA) inhaler 2 puff 2 puff Inhalation Given Terry Orlando RN      08/22/2018 2321 nitroglycerin (NITRO-BID) 2 % TD ointment 0 5 inch 0 5 inch Topical Given Terry Orlando RN      08/22/2018 2321 aspirin chewable tablet 81 mg 81 mg Oral Given Terry Orlando RN           Past Medical/Surgical History: Active Ambulatory Problems     Diagnosis Date Noted    Hemorrhoids, complicated 06/83/1826     Resolved Ambulatory Problems     Diagnosis Date Noted    No Resolved Ambulatory Problems     Past Medical History:   Diagnosis Date    Arthritis     Hemorrhoids, complicated 1/59/1352    Kidney stones     Migraine        Admitting Diagnosis: Chest pain [R07 9]  Bronchitis [J40]    Age/Sex: 39 y o  male    Assessment/Plan:   * Chest pain   Assessment & Plan     Pre admission, associated with diaphoresis, dyspnea  Admit to rule out ACS  EKG abnormal   Troponin negative, will trend  Consult Cardiology for further management  Telemetry  Monitor associated risk factors  Echo in a m     Aspirin, beta-blocker            VTE Prophylaxis: Low risk  / sequential compression device   Code Status:  Full code  POLST: POLST form is not discussed and not completed at this time    Discussion with family:  Family at bedside, plan of care discussed with    Anticipated Length of Stay:  Patient will be admitted on an Observation basis with an anticipated length of stay of  less than 2 midnights  Justification for Hospital Stay:  Chest pain, cardiology consult      Admission Orders:  Scheduled Meds:   Current Facility-Administered Medications:  acetaminophen 650 mg Oral Q4H PRN NIALL Torres   gabapentin 200 mg Oral BID NIALL Torres   melatonin 3 mg Oral HS NIALL Torres   metoprolol tartrate 25 mg Oral Q12H [address] NIALL Torres   multivitamin-minerals 1 tablet Oral Daily NIALL Torres   ondansetron 4 mg Intravenous Q6H PRN NIALL Torres   tamsulosin 0 4 mg Oral Daily With [employer]     Continuous Infusions:    PRN Meds:   acetaminophen    ondansetron    [address]  spirom   Cardiac diet   Up and OOB as annika   Consult cardiology   EKG prn cp   SCD  Serial trop - all wnl   8/23  Cbc, bmp , BNP , d-dimer, lipid profile   Gluc  163, wbc  11 35    Cardiology consult 8/23  Assessment/Plan:  1- chest pain, diaphoresis, dyspnea,  troponins negative x4  Will check nuclear stress test to assess for ischemia  Will check  Echocardiogram to assess LV function and regional wall motion for abnormalities  Further recommendations based upon stress test and echocardiogram findings

## 2018-08-23 NOTE — CONSULTS
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 male who has a history of   Psoriatic arthritis  Patient came to the emergency room with complaints of chest pain  He states it was substernal in nature  He states it felt like a flushing feeling  He states associated with sweating  He states he also had shortness of breath with exertion  He states the chest pain that occurred with sweating occurred at rest   He has wife tried did and stated he broke out into a sweat while they were sitting eating dinner couple of weeks ago  He has no personal history of CA D  He is a nonsmoker  He does have a family history of CAD      REVIEW OF SYSTEMS:  Constitutional:  Denies fever or chills   Eyes:  Denies change in visual acuity   HENT:  Denies nasal congestion or sore throat   Respiratory: + shortness of breath Denies cough  Cardiovascular: +  Diaphoresis, chest pain  GI:  Denies abdominal pain, nausea, vomiting, bloody stools or diarrhea   :  Denies dysuria, frequency, difficulty in micturition and nocturia  Musculoskeletal:  Denies back pain or joint pain   Neurologic:  Denies headache, focal weakness or sensory changes   Endocrine:  Denies polyuria or polydipsia   Lymphatic:  Denies swollen glands   Psychiatric:  Denies depression or anxiety     Historical Information   Past Medical History:   Diagnosis Date    Arthritis     Hemorrhoids, complicated 0/88/3405    Kidney stones     Migraine      Past Surgical History:   Procedure Laterality Date    HERNIA REPAIR      KIDNEY STONE SURGERY      ID COLONOSCOPY FLX DX W/COLLJ SPEC WHEN PFRMD N/A 3/10/2017    Procedure: COLONOSCOPY;  Surgeon: Prince Alvarez MD;  Location: [facility]; Service: Colorectal    ULNAR NERVE REPAIR       History   Alcohol Use No     History   Drug Use No     History   Smoking Status    Never Smoker   Smokeless Tobacco    Never Used     Family History: non-contributory    MEDS & ALLERGIES:  all current active meds have been reviewed     Allergies   Allergen Reactions    Xyzal [Levocetirizine] Anaphylaxis and GI Intolerance    Etodolac Rash     Has tolerated other NSAIDs without reaction       OBJECTIVE:  Vitals:   Vitals:    08/23/18 0700   BP: 143/69   Pulse: 84   Resp: 18   Temp: 98 6 °F (37 °C)   SpO2: 94%     Body mass index is 32 53 kg/m²  Systolic (96QMS), QOV:362 , Min:115 , ALQ:492     Diastolic (74LTS), XZP:52, Min:59, Max:87      Intake/Output Summary (Last 24 hours) at 08/23/18 1134  Last data filed at 08/23/18 1106   Gross per 24 hour   Intake             1240 ml   Output                0 ml   Net             1240 ml     Weight (last 2 days)     Date/Time   Weight    08/22/18 2357  109 (239 86)    08/22/18 1858  99 8 (220 02)            Invasive Devices     Peripheral Intravenous Line            Peripheral IV 08/22/18 1 day                PHYSICAL EXAMS:  General:  Patient is not in acute distress, laying in the bed comfortably, awake, alert responding to commands  Head: Normocephalic, Atraumatic  HEENT:  Both pupils normal-size atraumatic, normocephalic, nonicteric  Neck:  JVP not raised  Trachea central  Respiratory:  Bronchovascular breathing all over the chest without any accompaniment  Cardiovascular:  S1-S2 normal RRR without any murmur or rub  GI:  Abdomen soft nontender   Liver and spleen normal size  Musculoskeletal:  No edema  Integument:  No skin rashes or ulceration  Lymphatic:  No cervical or inguinal lymphadenopathy  Neurologic:  Patient is awake alert, responding to command, well-oriented to time and place and person    LABORATORY RESULTS:    Results from last 7 days  Lab Units 08/23/18  0355 08/23/18  0042 08/22/18  2224   TROPONIN I ng/mL <0 02 <0 02 <0 02     CBC with diff:   Results from last 7 days  Lab Units 08/23/18  0355 08/22/18  1925   WBC Thousand/uL 11 35* 9 99   HEMOGLOBIN g/dL 15 8 17 1*   HEMATOCRIT % 47 7 51 3*   MCV fL 90 89   PLATELETS Thousands/uL 248 284   MCH pg 29 7 29 6   MCHC g/dL 33 1 33 3   RDW % 13 0 12 8   MPV fL 11 7 12 0   NRBC AUTO /100 WBCs 0 0       CMP:  Results from last 7 days  Lab Units 08/23/18  0355 08/22/18  1925   SODIUM mmol/L 137 138   POTASSIUM mmol/L 4 1 4 0   CHLORIDE mmol/L 104 104   CO2 mmol/L 24 27   ANION GAP mmol/L 9 7   BUN mg/dL 16 17   CREATININE mg/dL 1 18 1 10   GLUCOSE RANDOM mg/dL 163* 100   CALCIUM mg/dL 8 8 9 2   AST U/L  --  41   ALT U/L  --  80*   ALK PHOS U/L  --  50   TOTAL PROTEIN g/dL  --  8 0   BILIRUBIN TOTAL mg/dL  --  0 70   EGFR ml/min/1 73sq m 75 81       BMP:  Results from last 7 days  Lab Units 08/23/18  0355 08/22/18  1925   SODIUM mmol/L 137 138   POTASSIUM mmol/L 4 1 4 0   CHLORIDE mmol/L 104 104   CO2 mmol/L 24 27   BUN mg/dL 16 17   CREATININE mg/dL 1 18 1 10   GLUCOSE RANDOM mg/dL 163* 100   CALCIUM mg/dL 8 8 9 2         Results from last 7 days  Lab Units 08/22/18  1925   NT-PRO BNP pg/mL 28        Results from last 7 days  Lab Units 08/23/18  0355   MAGNESIUM mg/dL 2 0                   Lipid Profile:   No results found for: CHOL  Lab Results   Component Value Date    HDL 42 08/23/2018    HDL 40 01/14/2017     Lab Results   Component Value Date    LDLCALC 119 (H) 08/23/2018    LDLCALC 101 (H) 01/14/2017     Lab Results   Component Value Date    TRIG 84 08/23/2018    TRIG 151 (H) 01/14/2017       Cardiac testing:   No results found for this or any previous visit  No results found for this or any previous visit  No procedure found  No results found for this or any previous visit  Imaging: I have personally reviewed pertinent reports  EKG reviewed personally:   SR, non specific t wave abnormality    Telemetry reviewed personally:   sr    Assessment/Plan:  1- chest pain, diaphoresis, dyspnea,  troponins negative x4  Will check nuclear stress test to assess for ischemia  Will check  Echocardiogram to assess LV function and regional wall motion for abnormalities  Further recommendations based upon stress test and echocardiogram findings  Code Status: Level 1 - Full Code    Thank you for allowing us to participate in this patient's care  This pt will follow up with [facility] once discharged  Counseling / Coordination of Care  Total floor / unit time spent today 35 minutes  Greater than 50% of total time was spent with the patient and / or family counseling and / or coordination of care  A description of the counseling / coordination of care: Review of history, current assessment, development of a plan  Spencer Romero PA-C  8/23/2018,11:34 AM    Portions of the record may have been created with voice recognition software   Occasional wrong word or "sound a like" substitutions may have occurred due to the inherent limitations of voice recognition software   Read the chart carefully and recognize, using context, where substitutions have occurred

## 2018-08-23 NOTE — PLAN OF CARE
CARDIOVASCULAR - ADULT     Maintains optimal cardiac output and hemodynamic stability Progressing     Absence of cardiac dysrhythmias or at baseline rhythm Progressing        DISCHARGE PLANNING     Discharge to home or other facility with appropriate resources Progressing        Knowledge Deficit     Patient/family/caregiver demonstrates understanding of disease process, treatment plan, medications, and discharge instructions Progressing        PAIN - ADULT     Verbalizes/displays adequate comfort level or baseline comfort level Progressing        Potential for Falls     Patient will remain free of falls Progressing        SAFETY ADULT     Maintain or return to baseline ADL function Progressing     Maintain or return mobility status to optimal level Progressing     Patient will remain free of falls Progressing

## 2018-08-24 NOTE — SOCIAL WORK
Denzel spoke with Pilar Barillas from [facility] who was able to provide patient with an appointment with Dr Arnie Martinez office on 08/29/18 at 1240p  CM contacted patient regarding this appointment  Patient has contact number to confirm appointment

## 2018-08-29 ENCOUNTER — OFFICE VISIT (OUTPATIENT)
Dept: CARDIOLOGY CLINIC | Facility: CLINIC | Age: 43
End: 2018-08-29
Payer: COMMERCIAL

## 2018-08-29 VITALS
SYSTOLIC BLOOD PRESSURE: 138 MMHG | HEIGHT: 72 IN | OXYGEN SATURATION: 95 % | HEART RATE: 93 BPM | DIASTOLIC BLOOD PRESSURE: 90 MMHG

## 2018-08-29 DIAGNOSIS — R07.9 CHEST PAIN: Primary | ICD-10-CM

## 2018-08-29 PROCEDURE — 99244 OFF/OP CNSLTJ NEW/EST MOD 40: CPT | Performed by: INTERNAL MEDICINE

## 2018-08-29 RX ORDER — LEFLUNOMIDE 20 MG/1
20 TABLET ORAL DAILY
COMMUNITY
End: 2022-05-27 | Stop reason: ALTCHOICE

## 2018-08-29 RX ORDER — ASPIRIN 81 MG/1
81 TABLET, CHEWABLE ORAL DAILY
Qty: 90 TABLET | Refills: 3 | Status: SHIPPED | OUTPATIENT
Start: 2018-08-29

## 2018-08-29 NOTE — LETTER
August 29, 2018     Moe Khan MD  [facility] [address] [facility]  [address]    Patient: Vladimir Taylor   YOB: 1975   Date of Visit: 8/29/2018       Dear Dr Nica Johnson: Thank you for referring Vladimir Taylor to me for evaluation  Below are my notes for this consultation  If you have questions, please do not hesitate to call me  I look forward to following your patient along with you  Sincerely,        Denia Scott DO        CC: No Recipients  Denia Scott DO  8/29/2018  5:56 PM  Sign at close encounter                                             Cardiology Consultation     Vladimir Taylor  [id number]  1975  [place] [address]    Dear Dr Nica Johnson,    Linda Becerril is a 79-year-old man who was referred to the [facility] Cardiology Department at [place] for the following issues:    1  Chest pain  2  Hypertension    Lynette Renee was recently seen in the [facility] [address] [place] [address] Emergency room for chest pain that was associated with some shortness of breath  These episodes that he was experiencing occur randomly  They may be somewhat aggravated by food  The 1st episode occurred after eating at [employer]  He describes it as chest heaviness with some shortness of breath  There may be a flushing sensation  He has not limited his activities in any way  And is able to perform his normal activities of daily living, including vigorous housework without restriction  He continues to get sporadic episodes, but these are unrelated to his level of exertion  They are not positional   Testing at [facility] included an echocardiogram and a SPECT, both of which were negative for abnormality        Patient Active Problem List   Diagnosis    Hemorrhoids, complicated    Chest pain     Past Medical History:   Diagnosis Date    Arthritis     Chest pain     Hemorrhoids, complicated 9/40/2101    Kidney stones     Migraine     Psoriasis     Psoriatic arthritis ([facility] [address] )      Social History     Social History    Marital status: /Civil Union     Spouse name: N/A    Number of children: N/A    Years of education: N/A     Occupational History    Not on file  Social History Main Topics    Smoking status: Never Smoker    Smokeless tobacco: Never Used    Alcohol use No    Drug use: No    Sexual activity: Not on file     Other Topics Concern    Not on file     Social History Narrative    No narrative on file      Family History   Problem Relation Age of Onset    Hypertension Mother     Liver cancer Father      Past Surgical History:   Procedure Laterality Date    HERNIA REPAIR      KIDNEY STONE SURGERY      AL COLONOSCOPY FLX DX W/COLLJ SPEC WHEN PFRMD N/A 3/10/2017    Procedure: COLONOSCOPY;  Surgeon: Bebeto Gomez MD;  Location: [facility];   Service: Colorectal    ULNAR NERVE REPAIR         Current Outpatient Prescriptions:     Adalimumab (HUMIRA PEN) 40 MG/0 8ML PNKT, Inject 40 mg under the skin, Disp: , Rfl:     Azelaic Acid (FINACEA) 15 % cream, As needed, Disp: , Rfl:     gabapentin (NEURONTIN) 100 mg capsule, Take 100 mg by mouth 3 (three) times a day  , Disp: , Rfl:     hydroxychloroquine (PLAQUENIL) 200 mg tablet, Take 400 mg by mouth daily at bedtime, Disp: , Rfl:     leflunomide (ARAVA) 20 MG tablet, Take 20 mg by mouth daily, Disp: , Rfl:     metoprolol tartrate (LOPRESSOR) 25 mg tablet, Take 1 tablet (25 mg total) by mouth every 12 (twelve) hours for 10 days, Disp: 20 tablet, Rfl: 0    metroNIDAZOLE (METROGEL) 1 % gel, As needed, Disp: , Rfl:     Multiple Vitamin (MULTIVITAMIN) tablet, Take 1 tablet by mouth daily, Disp: , Rfl:     naproxen (NAPROSYN) 500 mg tablet, Take by mouth, Disp: , Rfl:     naproxen (NAPROSYN) 500 mg tablet, Take 1 tablet by mouth every 12 (twelve) hours as needed for mild pain or moderate pain, Disp: 20 tablet, Rfl: 0    Triamcinolone Acetonide 55 MCG/ACT AERO, 2 sprays each nare as needed, Disp: , Rfl:     TURMERIC PO, Take by mouth, Disp: , Rfl:     aspirin 81 mg chewable tablet, Chew 1 tablet (81 mg total) daily, Disp: 90 tablet, Rfl: 3    Dermatological Products, Misc  (ELETONE) CREA, As needed, Disp: , Rfl:     HYDROcodone-acetaminophen (NORCO) 5-325 mg per tablet, Take 1 tablet by mouth every 6 (six) hours as needed for moderate pain (Not relieved by Anti-inflammatory) Max Daily Amount: 4 tablets (Patient not taking: Reported on 8/29/2018 ), Disp: 20 tablet, Rfl: 0    melatonin 1 mg, Take 1 mg by mouth daily at bedtime, Disp: , Rfl:     ondansetron (ZOFRAN-ODT) 4 mg disintegrating tablet, Take 1 tablet by mouth every 8 (eight) hours as needed for nausea or vomiting for up to 3 days, Disp: 9 tablet, Rfl: 0    tamsulosin (FLOMAX) 0 4 mg, Take 1 capsule by mouth daily with dinner for 5 days, Disp: 5 capsule, Rfl: 0  Allergies   Allergen Reactions    Xyzal [Levocetirizine] Anaphylaxis and GI Intolerance    Etodolac Rash     Has tolerated other NSAIDs without reaction     Vitals:    08/29/18 1635   BP: 138/90   Pulse: 93   SpO2: 95%   Height: 6' (1 829 m)       Labs:  Admission on 08/22/2018, Discharged on 08/23/2018   Component Date Value    WBC 08/22/2018 9 99     RBC 08/22/2018 5 77*    Hemoglobin 08/22/2018 17 1*    Hematocrit 08/22/2018 51 3*    MCV 08/22/2018 89     MCH 08/22/2018 29 6     MCHC 08/22/2018 33 3     RDW 08/22/2018 12 8     MPV 08/22/2018 12 0     Platelets 59/68/8915 284     nRBC 08/22/2018 0     Neutrophils Relative 08/22/2018 66     Immat GRANS % 08/22/2018 0     Lymphocytes Relative 08/22/2018 20     Monocytes Relative 08/22/2018 10     Eosinophils Relative 08/22/2018 3     Basophils Relative 08/22/2018 1     Neutrophils Absolute 08/22/2018 6 53     Immature Grans Absolute 08/22/2018 0 02     Lymphocytes Absolute 08/22/2018 2 04     Monocytes Absolute 08/22/2018 1 03  Eosinophils Absolute 08/22/2018 0 26     Basophils Absolute 08/22/2018 0 11*    Troponin I 08/22/2018 <0 02     Sodium 08/22/2018 138     Potassium 08/22/2018 4 0     Chloride 08/22/2018 104     CO2 08/22/2018 27     ANION GAP 08/22/2018 7     BUN 08/22/2018 17     Creatinine 08/22/2018 1 10     Glucose 08/22/2018 100     Calcium 08/22/2018 9 2     AST 08/22/2018 41     ALT 08/22/2018 80*    Alkaline Phosphatase 08/22/2018 50     Total Protein 08/22/2018 8 0     Albumin 08/22/2018 4 4     Total Bilirubin 08/22/2018 0 70     eGFR 08/22/2018 81     NT-proBNP 08/22/2018 28     Troponin I 08/22/2018 <0 02     Ventricular Rate 08/22/2018 88     Atrial Rate 08/22/2018 88     DE Interval 08/22/2018 154     QRSD Interval 08/22/2018 96     QT Interval 08/22/2018 386     QTC Interval 08/22/2018 467     P Axis 08/22/2018 50     QRS Axis 08/22/2018 75     T Wave Axis 08/22/2018 44     Troponin I 08/23/2018 <0 02     Troponin I 08/23/2018 <0 02     D-Dimer, Quant 08/23/2018 <270     Sodium 08/23/2018 137     Potassium 08/23/2018 4 1     Chloride 08/23/2018 104     CO2 08/23/2018 24     ANION GAP 08/23/2018 9     BUN 08/23/2018 16     Creatinine 08/23/2018 1 18     Glucose 08/23/2018 163*    Glucose, Fasting 08/23/2018 163*    Calcium 08/23/2018 8 8     eGFR 08/23/2018 75     WBC 08/23/2018 11 35*    RBC 08/23/2018 5 32     Hemoglobin 08/23/2018 15 8     Hematocrit 08/23/2018 47 7     MCV 08/23/2018 90     MCH 08/23/2018 29 7     MCHC 08/23/2018 33 1     RDW 08/23/2018 13 0     MPV 08/23/2018 11 7     Platelets 27/07/5235 248     nRBC 08/23/2018 0     Neutrophils Relative 08/23/2018 92*    Immat GRANS % 08/23/2018 0     Lymphocytes Relative 08/23/2018 6*    Monocytes Relative 08/23/2018 1*    Eosinophils Relative 08/23/2018 0     Basophils Relative 08/23/2018 1     Neutrophils Absolute 08/23/2018 10 36*    Immature Grans Absolute 08/23/2018 0 05     Lymphocytes Absolute 08/23/2018 0 73     Monocytes Absolute 08/23/2018 0 13*    Eosinophils Absolute 08/23/2018 0 01     Basophils Absolute 08/23/2018 0 07     Cholesterol 08/23/2018 178     Triglycerides 08/23/2018 84     HDL, Direct 08/23/2018 42     LDL Calculated 08/23/2018 119*    Magnesium 08/23/2018 2 0     Protocol Name 08/23/2018 TIFFANIE SIT     Time In Exercise Phase 08/23/2018 00:03:00     MAX  SYSTOLIC BP 05/13/8174 916     Max Diastolic Bp 15/55/1558 72     Max Heart Rate 08/23/2018 112     Max Predicted Heart Rate 08/23/2018 176     Reason for Termination 08/23/2018 Protocol Complete     Test Indication 08/23/2018 Screening for CAD     Target Hr Formular 08/23/2018 (220 - Age)*100%     Arrhy During Ex 08/23/2018 none     Chest Pain Statement 08/23/2018 non-limiting     Ventricular Rate 08/22/2018 80     Atrial Rate 08/22/2018 80     PA Interval 08/22/2018 156     QRSD Interval 08/22/2018 90     QT Interval 08/22/2018 412     QTC Interval 08/22/2018 475     P Axis 08/22/2018 50     QRS Axis 08/22/2018 63     T Wave [place] 08/22/2018 23      Imaging:   TTE 8/23/2018  SUMMARY  LEFT VENTRICLE:  Ejection fraction was estimated to be 60 %  There were no regional wall motion abnormalities  MITRAL VALVE:  There was trace regurgitation  TRICUSPID VALVE:  There was trace regurgitation  SPECT 8/23/2018  IMPRESSIONS: Normal study  Myocardial perfusion imaging was normal at rest and with stress  Left ventricular systolic function was normal     Review of Systems:  Review of Systems   Constitutional: Negative  HENT: Negative  Eyes: Negative  Respiratory: Negative  Cardiovascular: Negative  Gastrointestinal: Negative  Endocrine: Negative  Genitourinary: Negative  Musculoskeletal: Negative  Skin: Negative  Allergic/Immunologic: Negative  Neurological: Negative  Hematological: Negative  Psychiatric/Behavioral: Negative          Physical Exam:  Physical Exam Constitutional: He is oriented to person, place, and time  He appears well-developed and well-nourished  HENT:   Head: Normocephalic and atraumatic  Eyes: EOM are normal    Neck: No JVD present  No tracheal deviation present  No thyromegaly present  Cardiovascular: Regular rhythm, S1 normal and S2 normal   PMI is not displaced  Exam reveals no gallop  No murmur heard  Pulses:       Carotid pulses are 2+ on the right side, and 2+ on the left side  Radial pulses are 2+ on the right side, and 2+ on the left side  Femoral pulses are 2+ on the right side, and 2+ on the left side  Popliteal pulses are 2+ on the right side, and 2+ on the left side  Dorsalis pedis pulses are 2+ on the right side, and 2+ on the left side  Posterior tibial pulses are 2+ on the right side, and 2+ on the left side  Pulmonary/Chest: Breath sounds normal  No accessory muscle usage  No tachypnea  No respiratory distress  Abdominal: Soft  Bowel sounds are normal  He exhibits no distension  There is no tenderness  Neurological: He is alert and oriented to person, place, and time  Skin: Skin is warm and dry  Psychiatric: He has a normal mood and affect  His behavior is normal  Judgment and thought content normal        Discussion/Summary:  Noncardiac chest pain  Hypertension  Psoriatic arthritis  Obesity    Curly Sharp is pain is likely noncardiac given its lack of reliable reproducibility  True ischemic rest pain would certainly be reproducible with activity and would almost certainly show up on nuclear perfusion  This is somewhat reassuring  He likely has esophageal spasm which would also response to antianginal medications and may be better explained by ITs partial relationship to food  In spite of his chest pain being noncardiac, I am concerned about primary prevention, particularly in the setting of autoimmune diseases such as psoriatic arthritis    As you are aware, these diseases can cause premature acceleration of atherosclerosis  His ASCVD 10 year risk is 6 9%, however this does not incorporate conditions such as psoriatic arthritis  I have asked him to start 81 mg of aspirin and I will check a high sensitivity CRP and consider starting statin therapy if it is markedly elevated  We spent a fair amount of time in counseling about diet and exercise  We discussed the benefits of a high fruit and vegetable diet, limiting red meats and processed carbohydrates  I also encouraged him to begin an exercise regimen of 30 min per day 5 days a week  Ideally, I would like to see him lose 10-20% of his body weight  He will return to this office in 3 months time for re-evaluation  Thank you for the opportunity to consult on this patient  If you have any questions, please do not hesitate to call my office

## 2018-08-29 NOTE — PROGRESS NOTES
Cardiology Consultation     Javed Lopez  [id number]  1975  [facility]    Dear Dr Teryl Simmonds is a 58-year-old man who was referred to the [facility] Cardiology Department at [place] for the following issues:    1  Chest pain  2  Hypertension    Misa Angel was recently seen in the [facility] [address] [place] [address] Emergency room for chest pain that was associated with some shortness of breath  These episodes that he was experiencing occur randomly  They may be somewhat aggravated by food  The 1st episode occurred after eating at [employer]  He describes it as chest heaviness with some shortness of breath  There may be a flushing sensation  He has not limited his activities in any way  And is able to perform his normal activities of daily living, including vigorous housework without restriction  He continues to get sporadic episodes, but these are unrelated to his level of exertion  They are not positional   Testing at [facility] included an echocardiogram and a SPECT, both of which were negative for abnormality  Patient Active Problem List   Diagnosis    Hemorrhoids, complicated    Chest pain     Past Medical History:   Diagnosis Date    Arthritis     Chest pain     Hemorrhoids, complicated 4/11/3637    Kidney stones     Migraine     Psoriasis     Psoriatic arthritis ([facility] [address] )      Social History     Social History    Marital status: /Civil Union     Spouse name: N/A    Number of children: N/A    Years of education: N/A     Occupational History    Not on file       Social History Main Topics    Smoking status: Never Smoker    Smokeless tobacco: Never Used    Alcohol use No    Drug use: No    Sexual activity: Not on file     Other Topics Concern    Not on file     Social History Narrative    No narrative on file      Family History   Problem Relation Age of Onset    Hypertension Mother     Liver cancer Father      Past Surgical History:   Procedure Laterality Date    HERNIA REPAIR      KIDNEY STONE SURGERY      MT COLONOSCOPY FLX DX W/COLLJ SPEC WHEN PFRMD N/A 3/10/2017    Procedure: COLONOSCOPY;  Surgeon: Cyndi Jones MD;  Location: [facility];   Service: Colorectal    ULNAR NERVE REPAIR         Current Outpatient Prescriptions:     Adalimumab (HUMIRA PEN) 40 MG/0 8ML PNKT, Inject 40 mg under the skin, Disp: , Rfl:     Azelaic Acid (FINACEA) 15 % cream, As needed, Disp: , Rfl:     gabapentin (NEURONTIN) 100 mg capsule, Take 100 mg by mouth 3 (three) times a day  , Disp: , Rfl:     hydroxychloroquine (PLAQUENIL) 200 mg tablet, Take 400 mg by mouth daily at bedtime, Disp: , Rfl:     leflunomide (ARAVA) 20 MG tablet, Take 20 mg by mouth daily, Disp: , Rfl:     metoprolol tartrate (LOPRESSOR) 25 mg tablet, Take 1 tablet (25 mg total) by mouth every 12 (twelve) hours for 10 days, Disp: 20 tablet, Rfl: 0    metroNIDAZOLE (METROGEL) 1 % gel, As needed, Disp: , Rfl:     Multiple Vitamin (MULTIVITAMIN) tablet, Take 1 tablet by mouth daily, Disp: , Rfl:     naproxen (NAPROSYN) 500 mg tablet, Take by mouth, Disp: , Rfl:     naproxen (NAPROSYN) 500 mg tablet, Take 1 tablet by mouth every 12 (twelve) hours as needed for mild pain or moderate pain, Disp: 20 tablet, Rfl: 0    Triamcinolone Acetonide 55 MCG/ACT AERO, 2 sprays each nare as needed, Disp: , Rfl:     TURMERIC PO, Take by mouth, Disp: , Rfl:     aspirin 81 mg chewable tablet, Chew 1 tablet (81 mg total) daily, Disp: 90 tablet, Rfl: 3    Dermatological Products, Misc  (ELETONE) CREA, As needed, Disp: , Rfl:     HYDROcodone-acetaminophen (NORCO) 5-325 mg per tablet, Take 1 tablet by mouth every 6 (six) hours as needed for moderate pain (Not relieved by Anti-inflammatory) Max Daily Amount: 4 tablets (Patient not taking: Reported on 8/29/2018 ), Disp: 20 tablet, Rfl: 0    melatonin 1 mg, Take 1 mg by mouth daily at bedtime, Disp: , Rfl:     ondansetron (ZOFRAN-ODT) 4 mg disintegrating tablet, Take 1 tablet by mouth every 8 (eight) hours as needed for nausea or vomiting for up to 3 days, Disp: 9 tablet, Rfl: 0    tamsulosin (FLOMAX) 0 4 mg, Take 1 capsule by mouth daily with dinner for 5 days, Disp: 5 capsule, Rfl: 0  Allergies   Allergen Reactions    Xyzal [Levocetirizine] Anaphylaxis and GI Intolerance    Etodolac Rash     Has tolerated other NSAIDs without reaction     Vitals:    08/29/18 1635   BP: 138/90   Pulse: 93   SpO2: 95%   Height: 6' (1 829 m)       Labs:  Admission on 08/22/2018, Discharged on 08/23/2018   Component Date Value    WBC 08/22/2018 9 99     RBC 08/22/2018 5 77*    Hemoglobin 08/22/2018 17 1*    Hematocrit 08/22/2018 51 3*    MCV 08/22/2018 89     MCH 08/22/2018 29 6     MCHC 08/22/2018 33 3     RDW 08/22/2018 12 8     MPV 08/22/2018 12 0     Platelets 49/08/4912 284     nRBC 08/22/2018 0     Neutrophils Relative 08/22/2018 66     Immat GRANS % 08/22/2018 0     Lymphocytes Relative 08/22/2018 20     Monocytes Relative 08/22/2018 10     Eosinophils Relative 08/22/2018 3     Basophils Relative 08/22/2018 1     Neutrophils Absolute 08/22/2018 6 53     Immature Grans Absolute 08/22/2018 0 02     Lymphocytes Absolute 08/22/2018 2 04     Monocytes Absolute 08/22/2018 1 03     Eosinophils Absolute 08/22/2018 0 26     Basophils Absolute 08/22/2018 0 11*    Troponin I 08/22/2018 <0 02     Sodium 08/22/2018 138     Potassium 08/22/2018 4 0     Chloride 08/22/2018 104     CO2 08/22/2018 27     ANION GAP 08/22/2018 7     BUN 08/22/2018 17     Creatinine 08/22/2018 1 10     Glucose 08/22/2018 100     Calcium 08/22/2018 9 2     AST 08/22/2018 41     ALT 08/22/2018 80*    Alkaline Phosphatase 08/22/2018 50     Total Protein 08/22/2018 8 0     Albumin 08/22/2018 4 4     Total Bilirubin 08/22/2018 0 70     eGFR 08/22/2018 81     NT-proBNP 08/22/2018 28     Troponin I 08/22/2018 <0 02     Ventricular Rate 08/22/2018 88     Atrial Rate 08/22/2018 88     CO Interval 08/22/2018 154     QRSD Interval 08/22/2018 96     QT Interval 08/22/2018 386     QTC Interval 08/22/2018 467     P Axis 08/22/2018 50     QRS Axis 08/22/2018 75     T Wave Axis 08/22/2018 44     Troponin I 08/23/2018 <0 02     Troponin I 08/23/2018 <0 02     D-Dimer, Quant 08/23/2018 <270     Sodium 08/23/2018 137     Potassium 08/23/2018 4 1     Chloride 08/23/2018 104     CO2 08/23/2018 24     ANION GAP 08/23/2018 9     BUN 08/23/2018 16     Creatinine 08/23/2018 1 18     Glucose 08/23/2018 163*    Glucose, Fasting 08/23/2018 163*    Calcium 08/23/2018 8 8     eGFR 08/23/2018 75     WBC 08/23/2018 11 35*    RBC 08/23/2018 5 32     Hemoglobin 08/23/2018 15 8     Hematocrit 08/23/2018 47 7     MCV 08/23/2018 90     MCH 08/23/2018 29 7     MCHC 08/23/2018 33 1     RDW 08/23/2018 13 0     MPV 08/23/2018 11 7     Platelets 61/53/2423 248     nRBC 08/23/2018 0     Neutrophils Relative 08/23/2018 92*    Immat GRANS % 08/23/2018 0     Lymphocytes Relative 08/23/2018 6*    Monocytes Relative 08/23/2018 1*    Eosinophils Relative 08/23/2018 0     Basophils Relative 08/23/2018 1     Neutrophils Absolute 08/23/2018 10 36*    Immature Grans Absolute 08/23/2018 0 05     Lymphocytes Absolute 08/23/2018 0 73     Monocytes Absolute 08/23/2018 0 13*    Eosinophils Absolute 08/23/2018 0 01     Basophils Absolute 08/23/2018 0 07     Cholesterol 08/23/2018 178     Triglycerides 08/23/2018 84     HDL, Direct 08/23/2018 42     LDL Calculated 08/23/2018 119*    Magnesium 08/23/2018 2 0     Protocol Name 08/23/2018 TIFFANIE SIT     Time In Exercise Phase 08/23/2018 00:03:00     MAX   SYSTOLIC BP 52/23/3700 395     Max Diastolic Bp 82/62/8187 72     Max Heart Rate 08/23/2018 112     Max Predicted Heart Rate 08/23/2018 176     Reason for Termination 08/23/2018 Protocol Complete     Test Indication 08/23/2018 Screening for CAD     Target Hr Formular 08/23/2018 (220 - Age)*100%     Arrhy During Ex 08/23/2018 none     Chest Pain Statement 08/23/2018 non-limiting     Ventricular Rate 08/22/2018 80     Atrial Rate 08/22/2018 80     NH Interval 08/22/2018 156     QRSD Interval 08/22/2018 90     QT Interval 08/22/2018 412     QTC Interval 08/22/2018 475     P Axis 08/22/2018 50     QRS Axis 08/22/2018 63     T Wave [place] 08/22/2018 23      Imaging:   TTE 8/23/2018  SUMMARY  LEFT VENTRICLE:  Ejection fraction was estimated to be 60 %  There were no regional wall motion abnormalities  MITRAL VALVE:  There was trace regurgitation  TRICUSPID VALVE:  There was trace regurgitation  SPECT 8/23/2018  IMPRESSIONS: Normal study  Myocardial perfusion imaging was normal at rest and with stress  Left ventricular systolic function was normal     Review of Systems:  Review of Systems   Constitutional: Negative  HENT: Negative  Eyes: Negative  Respiratory: Negative  Cardiovascular: Negative  Gastrointestinal: Negative  Endocrine: Negative  Genitourinary: Negative  Musculoskeletal: Negative  Skin: Negative  Allergic/Immunologic: Negative  Neurological: Negative  Hematological: Negative  Psychiatric/Behavioral: Negative  Physical Exam:  Physical Exam   Constitutional: He is oriented to person, place, and time  He appears well-developed and well-nourished  HENT:   Head: Normocephalic and atraumatic  Eyes: EOM are normal    Neck: No JVD present  No tracheal deviation present  No thyromegaly present  Cardiovascular: Regular rhythm, S1 normal and S2 normal   PMI is not displaced  Exam reveals no gallop  No murmur heard  Pulses:       Carotid pulses are 2+ on the right side, and 2+ on the left side  Radial pulses are 2+ on the right side, and 2+ on the left side          Femoral pulses are 2+ on the right side, and 2+ on the left side  Popliteal pulses are 2+ on the right side, and 2+ on the left side  Dorsalis pedis pulses are 2+ on the right side, and 2+ on the left side  Posterior tibial pulses are 2+ on the right side, and 2+ on the left side  Pulmonary/Chest: Breath sounds normal  No accessory muscle usage  No tachypnea  No respiratory distress  Abdominal: Soft  Bowel sounds are normal  He exhibits no distension  There is no tenderness  Neurological: He is alert and oriented to person, place, and time  Skin: Skin is warm and dry  Psychiatric: He has a normal mood and affect  His behavior is normal  Judgment and thought content normal        Discussion/Summary:  Noncardiac chest pain  Hypertension  Psoriatic arthritis  Obesity    Karo Gonzales is pain is likely noncardiac given its lack of reliable reproducibility  True ischemic rest pain would certainly be reproducible with activity and would almost certainly show up on nuclear perfusion  This is somewhat reassuring  He likely has esophageal spasm which would also response to antianginal medications and may be better explained by ITs partial relationship to food  In spite of his chest pain being noncardiac, I am concerned about primary prevention, particularly in the setting of autoimmune diseases such as psoriatic arthritis  As you are aware, these diseases can cause premature acceleration of atherosclerosis  His ASCVD 10 year risk is 6 9%, however this does not incorporate conditions such as psoriatic arthritis  I have asked him to start 81 mg of aspirin and I will check a high sensitivity CRP and consider starting statin therapy if it is markedly elevated  We spent a fair amount of time in counseling about diet and exercise  We discussed the benefits of a high fruit and vegetable diet, limiting red meats and processed carbohydrates    I also encouraged him to begin an exercise regimen of 30 min per day 5 days a week   Ideally, I would like to see him lose 10-20% of his body weight  He will return to this office in 3 months time for re-evaluation  Thank you for the opportunity to consult on this patient  If you have any questions, please do not hesitate to call my office

## 2018-08-29 NOTE — PATIENT INSTRUCTIONS
Chest Pain   AMBULATORY CARE:   Chest pain  can be caused by a range of conditions, from not serious to life-threatening  It may be caused by a heart attack or a blood clot in your lungs  Sometimes chest pain or pressure is caused by poor blood flow to your heart (angina)  Infection, inflammation, or a fracture in the bones or cartilage in your chest can cause pain or discomfort  Chest pain can also be a symptom of a digestive problem, such as acid reflux or a stomach ulcer  An anxiety attack or a strong emotion such as anger can also cause chest pain  It is important to follow up with your healthcare provider to find the cause of your chest pain  Common symptoms you may have with chest pain:   · Fever or sweating     · Nausea or vomiting     · Shortness of breath     · Discomfort or pressure that spreads from your chest to your back, jaw, or arm     · A racing or slow heartbeat     · Feeling weak, tired, or faint  Call 911 if:   · You have any of the following signs of a heart attack:      ¨ Squeezing, pressure, or pain in your chest that lasts longer than 5 minutes or returns    ¨ Discomfort or pain in your back, neck, jaw, stomach, or arm     ¨ Trouble breathing    ¨ Nausea or vomiting    ¨ Lightheadedness or a sudden cold sweat, especially with chest pain or trouble breathing    Seek care immediately if:   · You have chest discomfort that gets worse, even with medicine  · You cough or vomit blood  · Your bowel movements are black or bloody  · You cannot stop vomiting, or it hurts to swallow  Contact your healthcare provider if:   · You have questions or concerns about your condition or care  Treatment for chest pain  may include medicine to treat your symptoms while your healthcare provider finds the cause of your chest pain  · Medicines  may be given to treat the cause of your chest pain  Examples include pain medicine, anxiety medicine, or medicines to increase blood flow to your heart  · Do not take certain medicines without asking your healthcare provider first   These include NSAIDs, herbal or vitamin supplements, or hormones (estrogen or progestin)  Follow up with your healthcare provider within 72 hours, or as directed: You may need to return for more tests to find the cause of your chest pain  You may be referred to a specialist, such as a cardiologist or gastroenterologist  Write down your questions so you remember to ask them during your visits  Healthy living tips: The following are general healthy guidelines  If your chest pain is caused by a heart problem, your healthcare provider will give you specific guidelines to follow  · Do not smoke  Nicotine and other chemicals in cigarettes and cigars can cause lung and heart damage  Ask your healthcare provider for information if you currently smoke and need help to quit  E-cigarettes or smokeless tobacco still contain nicotine  Talk to your healthcare provider before you use these products  · Eat a variety of healthy, low-fat foods  Healthy foods include fruits, vegetables, whole-grain breads, low-fat dairy products, beans, lean meats, and fish  Ask for more information about a heart healthy diet  · Ask about activity  Your healthcare provider will tell you which activities to limit or avoid  Ask when you can drive, return to work, and have sex  Ask about the best exercise plan for you  · Maintain a healthy weight  Ask your healthcare provider how much you should weigh  Ask him or her to help you create a weight loss plan if you are overweight  · Get the flu and pneumonia vaccines  All adults should get the influenza (flu) vaccine  Get it every year as soon as it becomes available  The pneumococcal vaccine is given to adults aged 72 years or older  The vaccine is given every 5 years to prevent pneumococcal disease, such as pneumonia    © 2017 [employer] Information is for End User's use only and may not be sold, redistributed or otherwise used for commercial purposes  All illustrations and images included in CareNotes® are the copyrighted property of A [employer] M [employer] Inc  or [employer]  The above information is an  only  It is not intended as medical advice for individual conditions or treatments  Talk to your doctor, nurse or pharmacist before following any medical regimen to see if it is safe and effective for you

## 2018-11-09 ENCOUNTER — TELEPHONE (OUTPATIENT)
Dept: CARDIOLOGY CLINIC | Facility: CLINIC | Age: 43
End: 2018-11-09

## 2018-11-13 ENCOUNTER — APPOINTMENT (OUTPATIENT)
Dept: LAB | Facility: CLINIC | Age: 43
End: 2018-11-13
Payer: COMMERCIAL

## 2018-11-13 DIAGNOSIS — R07.9 CHEST PAIN: ICD-10-CM

## 2018-11-13 LAB — CRP SERPL HS-MCNC: <0.9 MG/L

## 2018-11-13 PROCEDURE — 36415 COLL VENOUS BLD VENIPUNCTURE: CPT

## 2018-11-13 PROCEDURE — 86141 C-REACTIVE PROTEIN HS: CPT

## 2018-11-13 NOTE — TELEPHONE ENCOUNTER
He should be getting a high sensitivity CRP drawn  I had ordered this at our last visit     [facility]

## 2018-11-13 NOTE — TELEPHONE ENCOUNTER
LM ON PT'S VM LETTING HIM KNOW ABOUT THE BW ORDER & THAT HE COULD GO TO A [facility] & THE ORDER IS ALREADY IN EPIC

## 2018-11-13 NOTE — TELEPHONE ENCOUNTER
PT CALLED BACK ASKING IF FASTING WAS REQUIRED  JONATHAN VERIFIED IT DOES NOT REQUIRE FASTING  PT SAID HE WILL GO LATER ON TODAY AFTER WORK TO A [facility] LAB

## 2018-11-14 ENCOUNTER — OFFICE VISIT (OUTPATIENT)
Dept: CARDIOLOGY CLINIC | Facility: CLINIC | Age: 43
End: 2018-11-14
Payer: COMMERCIAL

## 2018-11-14 VITALS
HEART RATE: 86 BPM | OXYGEN SATURATION: 98 % | HEIGHT: 72 IN | BODY MASS INDEX: 31.79 KG/M2 | SYSTOLIC BLOOD PRESSURE: 118 MMHG | WEIGHT: 234.7 LBS | DIASTOLIC BLOOD PRESSURE: 78 MMHG

## 2018-11-14 DIAGNOSIS — I10 ESSENTIAL HYPERTENSION: ICD-10-CM

## 2018-11-14 DIAGNOSIS — K22.4 ESOPHAGEAL SPASM: ICD-10-CM

## 2018-11-14 DIAGNOSIS — R07.9 CHEST PAIN, UNSPECIFIED TYPE: Primary | ICD-10-CM

## 2018-11-14 DIAGNOSIS — L40.9 PSORIASIS: ICD-10-CM

## 2018-11-14 PROCEDURE — 99213 OFFICE O/P EST LOW 20 MIN: CPT | Performed by: INTERNAL MEDICINE

## 2018-11-14 RX ORDER — PANTOPRAZOLE SODIUM 40 MG/1
40 TABLET, DELAYED RELEASE ORAL DAILY
Qty: 90 TABLET | Refills: 3 | Status: SHIPPED | OUTPATIENT
Start: 2018-11-14 | End: 2022-05-27 | Stop reason: ALTCHOICE

## 2018-11-14 NOTE — LETTER
November 14, 2018     Earline Sarmiento MD  [facility] [address] [facility]  [address]    Patient: Barrera Louis   YOB: 1975   Date of Visit: 11/14/2018       Dear Dr Valeria Hernandez: Thank you for referring Barrera Louis to me for evaluation  Below are my notes for this consultation  If you have questions, please do not hesitate to call me  I look forward to following your patient along with you  Sincerely,        Yuly Sood DO        CC: No Recipients  Yuly Sood DO  11/14/2018  3:35 PM  Sign at close encounter                                             Cardiology Follow Up    Barrera Louis  1975  [id number]  [address] PHYSICIAN  [address]    Dear Dr Valeria Hernandez,     Makenzie Hebert is a 78-year-old man who was referred to the [facility]iology Department at [place] for the following issues:     1  Chest pain  2  Hypertension      Interval History: At our last visit, we concluded that [facility] chest pain was likely noncardiac, more likely esophageal in nature  To emphasize this point, he reports that he still gets some of his chest pain, though it is almost entirely related to food intake  He has continued working as a  in the [employer]  He reports no relationship of his chest discomfort exercise  He continues to be treated for his psoriasis and psoriatic arthritis  Due to the underlying inflammatory nature of an autoimmune disease, this does place him at higher risk for coronary disease  He was borderline risk for primary prevention cholesterol management  We decided to check a high sensitivity CRP, with intent to treat if elevated  That CRP is undetectable        Patient Active Problem List   Diagnosis    Hemorrhoids, complicated    Chest pain     Past Medical History:   Diagnosis Date    Arthritis     Chest pain     Hemorrhoids, complicated 6/02/5545    Kidney stones     Migraine     Psoriasis     Psoriatic arthritis ([facility] [address] )      Social History     Social History    Marital status: /Civil Union     Spouse name: N/A    Number of children: N/A    Years of education: N/A     Occupational History    Not on file  Social History Main Topics    Smoking status: Never Smoker    Smokeless tobacco: Never Used    Alcohol use No    Drug use: No    Sexual activity: Not on file     Other Topics Concern    Not on file     Social History Narrative    No narrative on file      Family History   Problem Relation Age of Onset    Hypertension Mother     Liver cancer Father      Past Surgical History:   Procedure Laterality Date    HERNIA REPAIR      KIDNEY STONE SURGERY      CT COLONOSCOPY FLX DX W/COLLJ SPEC WHEN PFRMD N/A 3/10/2017    Procedure: COLONOSCOPY;  Surgeon: Evan Colbert MD;  Location: [facility];   Service: Colorectal    ULNAR NERVE REPAIR         Current Outpatient Prescriptions:     aspirin 81 mg chewable tablet, Chew 1 tablet (81 mg total) daily, Disp: 90 tablet, Rfl: 3    Azelaic Acid (FINACEA) 15 % cream, As needed, Disp: , Rfl:     Dermatological Products, Misc  (ELETONE) CREA, As needed, Disp: , Rfl:     etanercept (ENBREL SURECLICK) 50 MG/ML injection, Inject 50 mg under the skin once a week, Disp: , Rfl:     gabapentin (NEURONTIN) 100 mg capsule, Take 100 mg by mouth 3 (three) times a day  , Disp: , Rfl:     hydroxychloroquine (PLAQUENIL) 200 mg tablet, Take 400 mg by mouth daily at bedtime, Disp: , Rfl:     leflunomide (ARAVA) 20 MG tablet, Take 20 mg by mouth daily, Disp: , Rfl:     Multiple Vitamin (MULTIVITAMIN) tablet, Take 1 tablet by mouth daily, Disp: , Rfl:     Naproxen Sodium (ALEVE PO), Take by mouth 2 tabs in the am and 2 in the pm, Disp: , Rfl:     Adalimumab (HUMIRA PEN) 40 MG/0 8ML PNKT, Inject 40 mg under the skin, Disp: , Rfl:     HYDROcodone-acetaminophen (NORCO) 5-325 mg per tablet, Take 1 tablet by mouth every 6 (six) hours as needed for moderate pain (Not relieved by Anti-inflammatory) Max Daily Amount: 4 tablets (Patient not taking: Reported on 8/29/2018 ), Disp: 20 tablet, Rfl: 0    melatonin 1 mg, Take 1 mg by mouth daily at bedtime, Disp: , Rfl:     metoprolol tartrate (LOPRESSOR) 25 mg tablet, Take 1 tablet (25 mg total) by mouth every 12 (twelve) hours for 10 days, Disp: 20 tablet, Rfl: 0    metroNIDAZOLE (METROGEL) 1 % gel, As needed, Disp: , Rfl:     naproxen (NAPROSYN) 500 mg tablet, Take by mouth, Disp: , Rfl:     naproxen (NAPROSYN) 500 mg tablet, Take 1 tablet by mouth every 12 (twelve) hours as needed for mild pain or moderate pain (Patient not taking: Reported on 11/14/2018 ), Disp: 20 tablet, Rfl: 0    ondansetron (ZOFRAN-ODT) 4 mg disintegrating tablet, Take 1 tablet by mouth every 8 (eight) hours as needed for nausea or vomiting for up to 3 days, Disp: 9 tablet, Rfl: 0    tamsulosin (FLOMAX) 0 4 mg, Take 1 capsule by mouth daily with dinner for 5 days, Disp: 5 capsule, Rfl: 0    Triamcinolone Acetonide 55 MCG/ACT AERO, 2 sprays each nare as needed, Disp: , Rfl:     TURMERIC PO, Take by mouth, Disp: , Rfl:   Allergies   Allergen Reactions    Xyzal [Levocetirizine] Anaphylaxis and GI Intolerance    Etodolac Rash     Has tolerated other NSAIDs without reaction       Labs:  Results for Yazan Ramos (MRN [id number]) as of 11/14/2018 15:30   Ref  Range 11/13/2018 17:32   HIGH SENSITIVITY C-REACTIVE PROTEIN Latest Units: mg/L <0 90         Review of Systems:  Review of Systems   Constitutional: Negative  Respiratory: Negative  Cardiovascular: Positive for chest pain (associated with food intake)  Gastrointestinal: Negative  Endocrine: Negative  Musculoskeletal: Negative  Skin: Negative  Neurological: Negative  Hematological: Negative  Physical Exam:  Physical Exam   Constitutional: He is oriented to person, place, and time  He appears well-developed and well-nourished  HENT:   Head: Normocephalic and atraumatic  Eyes: Conjunctivae and EOM are normal    Neck: No hepatojugular reflux and no JVD present  Carotid bruit is not present  No tracheal deviation present  No thyromegaly present  Cardiovascular: Regular rhythm, S1 normal and S2 normal   PMI is not displaced  Exam reveals no gallop  No murmur heard  Pulses:       Carotid pulses are 2+ on the right side, and 2+ on the left side  Radial pulses are 2+ on the right side, and 2+ on the left side  Femoral pulses are 2+ on the right side, and 2+ on the left side  Dorsalis pedis pulses are 2+ on the right side, and 2+ on the left side  Posterior tibial pulses are 2+ on the right side, and 2+ on the left side  Pulmonary/Chest: Breath sounds normal  No accessory muscle usage  No tachypnea  No respiratory distress  Abdominal: Soft  Bowel sounds are normal  He exhibits no distension  There is no tenderness  Musculoskeletal:   No lower extremity edema  Lymphadenopathy:        Head (right side): No submental, no submandibular, no tonsillar, no preauricular, no posterior auricular and no occipital adenopathy present  Head (left side): No submental, no submandibular, no tonsillar, no preauricular, no posterior auricular and no occipital adenopathy present  He has no cervical adenopathy  Neurological: He is alert and oriented to person, place, and time  Skin: Skin is warm and dry  Psychiatric: He has a normal mood and affect  His behavior is normal  Judgment and thought content normal        Discussion/Summary:  Noncardiac chest pain, likely GERD/esophageal spasm  Moderate risk for coronary artery disease, low CRP  Psoriasis and psoriatic arthritis  Hypertension    Medications reviewed today  He is on appropriate medication at this time  Given the low CRP, I will not start a statin at this time, though we will revisit this at a later date    I did prescribe pantoprazole 40 mg daily to assist with his GI associated pain  Blood pressure and heart rate are controlled today  We discussed healthy lifestyle habits  I encouraged him to monitor his diet and minimize his processed carbohydrate intake  I would like to see if he could lose some weight and continue to remain physically active  He will follow up in 1 year, sooner if any acute issues arise  Thank you for the opportunity to consult on this patient  If you have any questions, please call my office

## 2018-11-14 NOTE — PROGRESS NOTES
Cardiology Follow Up    Laurence Gallardo  1975  [id number]  [address] PHYSICIAN  [address] [facility]    Dear Dr Yue Warren,     Javi Downs is a 77-year-old man who was referred to the [facility] Cardiology Department at [place] for the following issues:     1  Chest pain  2  Hypertension      Interval History: At our last visit, we concluded that [facility] chest pain was likely noncardiac, more likely esophageal in nature  To emphasize this point, he reports that he still gets some of his chest pain, though it is almost entirely related to food intake  He has continued working as a  in the [employer]  He reports no relationship of his chest discomfort exercise  He continues to be treated for his psoriasis and psoriatic arthritis  Due to the underlying inflammatory nature of an autoimmune disease, this does place him at higher risk for coronary disease  He was borderline risk for primary prevention cholesterol management  We decided to check a high sensitivity CRP, with intent to treat if elevated  That CRP is undetectable  Patient Active Problem List   Diagnosis    Hemorrhoids, complicated    Chest pain     Past Medical History:   Diagnosis Date    Arthritis     Chest pain     Hemorrhoids, complicated 9/43/9734    Kidney stones     Migraine     Psoriasis     Psoriatic arthritis ([facility] [address] )      Social History     Social History    Marital status: /Civil Union     Spouse name: N/A    Number of children: N/A    Years of education: N/A     Occupational History    Not on file       Social History Main Topics    Smoking status: Never Smoker    Smokeless tobacco: Never Used    Alcohol use No    Drug use: No    Sexual activity: Not on file     Other Topics Concern    Not on file     Social History Narrative    No narrative on file      Family History   Problem Relation Age of Onset    Hypertension Mother     Liver cancer Father      Past Surgical History:   Procedure Laterality Date    HERNIA REPAIR      KIDNEY STONE SURGERY      PA COLONOSCOPY FLX DX W/COLLJ SPEC WHEN PFRMD N/A 3/10/2017    Procedure: COLONOSCOPY;  Surgeon: Isreal Geer, MD;  Location: [facility];   Service: Colorectal    ULNAR NERVE REPAIR         Current Outpatient Prescriptions:     aspirin 81 mg chewable tablet, Chew 1 tablet (81 mg total) daily, Disp: 90 tablet, Rfl: 3    Azelaic Acid (FINACEA) 15 % cream, As needed, Disp: , Rfl:     Dermatological Products, Misc  (ELETONE) CREA, As needed, Disp: , Rfl:     etanercept (ENBREL SURECLICK) 50 MG/ML injection, Inject 50 mg under the skin once a week, Disp: , Rfl:     gabapentin (NEURONTIN) 100 mg capsule, Take 100 mg by mouth 3 (three) times a day  , Disp: , Rfl:     hydroxychloroquine (PLAQUENIL) 200 mg tablet, Take 400 mg by mouth daily at bedtime, Disp: , Rfl:     leflunomide (ARAVA) 20 MG tablet, Take 20 mg by mouth daily, Disp: , Rfl:     Multiple Vitamin (MULTIVITAMIN) tablet, Take 1 tablet by mouth daily, Disp: , Rfl:     Naproxen Sodium (ALEVE PO), Take by mouth 2 tabs in the am and 2 in the pm, Disp: , Rfl:     Adalimumab (HUMIRA PEN) 40 MG/0 8ML PNKT, Inject 40 mg under the skin, Disp: , Rfl:     HYDROcodone-acetaminophen (NORCO) 5-325 mg per tablet, Take 1 tablet by mouth every 6 (six) hours as needed for moderate pain (Not relieved by Anti-inflammatory) Max Daily Amount: 4 tablets (Patient not taking: Reported on 8/29/2018 ), Disp: 20 tablet, Rfl: 0    melatonin 1 mg, Take 1 mg by mouth daily at bedtime, Disp: , Rfl:     metoprolol tartrate (LOPRESSOR) 25 mg tablet, Take 1 tablet (25 mg total) by mouth every 12 (twelve) hours for 10 days, Disp: 20 tablet, Rfl: 0    metroNIDAZOLE (METROGEL) 1 % gel, As needed, Disp: , Rfl:     naproxen (NAPROSYN) 500 mg tablet, Take by mouth, Disp: , Rfl:     naproxen (NAPROSYN) 500 mg tablet, Take 1 tablet by mouth every 12 (twelve) hours as needed for mild pain or moderate pain (Patient not taking: Reported on 11/14/2018 ), Disp: 20 tablet, Rfl: 0    ondansetron (ZOFRAN-ODT) 4 mg disintegrating tablet, Take 1 tablet by mouth every 8 (eight) hours as needed for nausea or vomiting for up to 3 days, Disp: 9 tablet, Rfl: 0    tamsulosin (FLOMAX) 0 4 mg, Take 1 capsule by mouth daily with dinner for 5 days, Disp: 5 capsule, Rfl: 0    Triamcinolone Acetonide 55 MCG/ACT AERO, 2 sprays each nare as needed, Disp: , Rfl:     TURMERIC PO, Take by mouth, Disp: , Rfl:   Allergies   Allergen Reactions    Xyzal [Levocetirizine] Anaphylaxis and GI Intolerance    Etodolac Rash     Has tolerated other NSAIDs without reaction       Labs:  Results for Rosana Delcid (MRN [id number]) as of 11/14/2018 15:30   Ref  Range 11/13/2018 17:32   HIGH SENSITIVITY C-REACTIVE PROTEIN Latest Units: mg/L <0 90         Review of Systems:  Review of Systems   Constitutional: Negative  Respiratory: Negative  Cardiovascular: Positive for chest pain (associated with food intake)  Gastrointestinal: Negative  Endocrine: Negative  Musculoskeletal: Negative  Skin: Negative  Neurological: Negative  Hematological: Negative  Physical Exam:  Physical Exam   Constitutional: He is oriented to person, place, and time  He appears well-developed and well-nourished  HENT:   Head: Normocephalic and atraumatic  Eyes: Conjunctivae and EOM are normal    Neck: No hepatojugular reflux and no JVD present  Carotid bruit is not present  No tracheal deviation present  No thyromegaly present  Cardiovascular: Regular rhythm, S1 normal and S2 normal   PMI is not displaced  Exam reveals no gallop  No murmur heard  Pulses:       Carotid pulses are 2+ on the right side, and 2+ on the left side  Radial pulses are 2+ on the right side, and 2+ on the left side          Femoral pulses are 2+ on the right side, and 2+ on the left side  Dorsalis pedis pulses are 2+ on the right side, and 2+ on the left side  Posterior tibial pulses are 2+ on the right side, and 2+ on the left side  Pulmonary/Chest: Breath sounds normal  No accessory muscle usage  No tachypnea  No respiratory distress  Abdominal: Soft  Bowel sounds are normal  He exhibits no distension  There is no tenderness  Musculoskeletal:   No lower extremity edema  Lymphadenopathy:        Head (right side): No submental, no submandibular, no tonsillar, no preauricular, no posterior auricular and no occipital adenopathy present  Head (left side): No submental, no submandibular, no tonsillar, no preauricular, no posterior auricular and no occipital adenopathy present  He has no cervical adenopathy  Neurological: He is alert and oriented to person, place, and time  Skin: Skin is warm and dry  Psychiatric: He has a normal mood and affect  His behavior is normal  Judgment and thought content normal        Discussion/Summary:  Noncardiac chest pain, likely GERD/esophageal spasm  Moderate risk for coronary artery disease, low CRP  Psoriasis and psoriatic arthritis  Hypertension    Medications reviewed today  He is on appropriate medication at this time  Given the low CRP, I will not start a statin at this time, though we will revisit this at a later date  I did prescribe pantoprazole 40 mg daily to assist with his GI associated pain  Blood pressure and heart rate are controlled today  We discussed healthy lifestyle habits  I encouraged him to monitor his diet and minimize his processed carbohydrate intake  I would like to see if he could lose some weight and continue to remain physically active  He will follow up in 1 year, sooner if any acute issues arise  Thank you for the opportunity to consult on this patient  If you have any questions, please call my office

## 2018-12-18 ENCOUNTER — APPOINTMENT (EMERGENCY)
Dept: CT IMAGING | Facility: HOSPITAL | Age: 43
End: 2018-12-18
Payer: COMMERCIAL

## 2018-12-18 ENCOUNTER — HOSPITAL ENCOUNTER (EMERGENCY)
Facility: HOSPITAL | Age: 43
Discharge: HOME | End: 2018-12-18
Attending: EMERGENCY MEDICINE | Admitting: EMERGENCY MEDICINE
Payer: COMMERCIAL

## 2018-12-18 VITALS
SYSTOLIC BLOOD PRESSURE: 127 MMHG | RESPIRATION RATE: 20 BRPM | OXYGEN SATURATION: 98 % | DIASTOLIC BLOOD PRESSURE: 75 MMHG | TEMPERATURE: 98.1 F | HEART RATE: 71 BPM

## 2018-12-18 DIAGNOSIS — R10.9 FLANK PAIN: Primary | ICD-10-CM

## 2018-12-18 DIAGNOSIS — R10.30 GROIN PAIN: ICD-10-CM

## 2018-12-18 DIAGNOSIS — M54.50 LOWER BACK PAIN: ICD-10-CM

## 2018-12-18 LAB
BACTERIA UR QL AUTO: ABNORMAL /HPF
BILIRUB UR QL STRIP: NEGATIVE
CLARITY UR: CLEAR
COLOR UR: ABNORMAL
GLUCOSE UR STRIP-MCNC: NEGATIVE MG/DL
HGB UR QL STRIP.AUTO: NEGATIVE
KETONES UR STRIP-MCNC: NEGATIVE MG/DL
LEUKOCYTE ESTERASE UR QL STRIP: NEGATIVE
NITRITE UR QL STRIP: NEGATIVE
NON-SQ EPI CELLS URNS QL MICRO: ABNORMAL /HPF
PH UR STRIP.AUTO: 5.5 [PH] (ref 4.5–8)
PROT UR STRIP-MCNC: NEGATIVE MG/DL
RBC #/AREA URNS AUTO: ABNORMAL /HPF
SP GR UR STRIP.AUTO: 1.02 (ref 1–1.03)
UROBILINOGEN UR QL STRIP.AUTO: 0.2 E.U./DL
WBC #/AREA URNS AUTO: ABNORMAL /HPF

## 2018-12-18 PROCEDURE — 99284 EMERGENCY DEPT VISIT MOD MDM: CPT

## 2018-12-18 PROCEDURE — 74176 CT ABD & PELVIS W/O CONTRAST: CPT

## 2018-12-18 PROCEDURE — 96372 THER/PROPH/DIAG INJ SC/IM: CPT

## 2018-12-18 PROCEDURE — 81001 URINALYSIS AUTO W/SCOPE: CPT | Performed by: EMERGENCY MEDICINE

## 2018-12-18 RX ORDER — HYDROMORPHONE HCL/PF 1 MG/ML
1 SYRINGE (ML) INJECTION ONCE
Status: COMPLETED | OUTPATIENT
Start: 2018-12-18 | End: 2018-12-18

## 2018-12-18 RX ORDER — IBUPROFEN 600 MG/1
600 TABLET ORAL ONCE
Status: COMPLETED | OUTPATIENT
Start: 2018-12-18 | End: 2018-12-18

## 2018-12-18 RX ORDER — NAPROXEN 500 MG/1
500 TABLET ORAL 2 TIMES DAILY PRN
Qty: 14 TABLET | Refills: 0 | Status: SHIPPED | OUTPATIENT
Start: 2018-12-18 | End: 2018-12-25

## 2018-12-18 RX ORDER — METHOCARBAMOL 500 MG/1
500 TABLET, FILM COATED ORAL ONCE
Status: COMPLETED | OUTPATIENT
Start: 2018-12-18 | End: 2018-12-18

## 2018-12-18 RX ORDER — METHOCARBAMOL 500 MG/1
500 TABLET, FILM COATED ORAL 2 TIMES DAILY
Qty: 20 TABLET | Refills: 0 | Status: SHIPPED | OUTPATIENT
Start: 2018-12-18 | End: 2022-05-27 | Stop reason: ALTCHOICE

## 2018-12-18 RX ADMIN — METHOCARBAMOL 500 MG: 500 TABLET ORAL at 19:58

## 2018-12-18 RX ADMIN — IBUPROFEN 600 MG: 600 TABLET ORAL at 18:10

## 2018-12-18 RX ADMIN — HYDROMORPHONE HYDROCHLORIDE 1 MG: 1 INJECTION, SOLUTION INTRAMUSCULAR; INTRAVENOUS; SUBCUTANEOUS at 18:10

## 2018-12-18 NOTE — ED PROVIDER NOTES
History  Chief Complaint   Patient presents with    Flank Pain     Pt c/o b/l flank pain radiating to b/l groin pain x3 weeks   Groin Pain     HPI  79-year-old male with history of kidney stones presents to the emergency department with bilateral flank pain  Patient states that he has had intermittent flank pain and bilateral groin pain for the past 3 weeks  Symptoms acutely worsened over the past few days room patient was evaluated by his PCP yesterday  Urinalysis did not reveal infection and patient was scheduled for CT, but is unable to get an appointment for CT until Monday  He presents today with continued pain and concern for kidney stones, as he has had stones with obstruction in the past   On review of systems, patient states that last week he had 2 days of chills, diarrhea, and generalized malaise, all of which have since resolved  He believes that pain is similar to that which she has had in the past with kidney stones  Prior to Admission Medications   Prescriptions Last Dose Informant Patient Reported? Taking?    Adalimumab (HUMIRA PEN) 40 MG/0 8ML PNKT  Self Yes No   Sig: Inject 40 mg under the skin   Azelaic Acid (FINACEA) 15 % cream  Self Yes No   Sig: As needed   Dermatological Products, Misc  (ELETONE) CREA  Self Yes No   Sig: As needed   Multiple Vitamin (MULTIVITAMIN) tablet  Self Yes No   Sig: Take 1 tablet by mouth daily   Naproxen Sodium (ALEVE PO)  Self Yes No   Sig: Take by mouth 2 tabs in the am and 2 in the pm   aspirin 81 mg chewable tablet  Self No No   Sig: Chew 1 tablet (81 mg total) daily   etanercept (ENBREL SURECLICK) 50 MG/ML injection  Self Yes No   Sig: Inject 50 mg under the skin once a week   gabapentin (NEURONTIN) 100 mg capsule  Self Yes No   Sig: Take 100 mg by mouth 3 (three) times a day     hydroxychloroquine (PLAQUENIL) 200 mg tablet  Self Yes No   Sig: Take 400 mg by mouth daily at bedtime   leflunomide (ARAVA) 20 MG tablet  Self Yes No   Sig: Take 20 mg by mouth daily   metroNIDAZOLE (METROGEL) 1 % gel  Self Yes No   Sig: As needed   naproxen (NAPROSYN) 500 mg tablet  Self Yes No   Sig: Take by mouth   naproxen (NAPROSYN) 500 mg tablet  Self No No   Sig: Take 1 tablet by mouth every 12 (twelve) hours as needed for mild pain or moderate pain   Patient not taking: Reported on 11/14/2018    pantoprazole (PROTONIX) 40 mg tablet   No No   Sig: Take 1 tablet (40 mg total) by mouth daily      Facility-Administered Medications: None       Past Medical History:   Diagnosis Date    Arthritis     Chest pain     Hemorrhoids, complicated 8/46/8990    Kidney stones     Migraine     Psoriasis     Psoriatic arthritis ([facility] [address] )        Past Surgical History:   Procedure Laterality Date    HERNIA REPAIR      KIDNEY STONE SURGERY      MD COLONOSCOPY FLX DX W/COLLJ SPEC WHEN PFRMD N/A 3/10/2017    Procedure: COLONOSCOPY;  Surgeon: Krunal Lin MD;  Location: [facility]; Service: Colorectal    ULNAR NERVE REPAIR         Family History   Problem Relation Age of Onset    Hypertension Mother     Liver cancer Father      I have reviewed and agree with the history as documented  Social History   Substance Use Topics    Smoking status: Never Smoker    Smokeless tobacco: Never Used    Alcohol use No        Review of Systems   Constitutional: Negative for chills and fever  Respiratory: Negative for shortness of breath  Cardiovascular: Negative for chest pain  Gastrointestinal: Positive for abdominal pain  Negative for nausea and vomiting  Genitourinary: Negative for difficulty urinating, testicular pain and urgency  Musculoskeletal: Positive for back pain  Skin: Negative for rash and wound  Allergic/Immunologic: Negative for immunocompromised state  Neurological: Negative for headaches  Psychiatric/Behavioral: The patient is not nervous/anxious  All other systems reviewed and are negative        Physical Exam  Physical Exam   Constitutional: He is oriented to person, place, and time  He appears well-nourished  No distress  HENT:   Head: Normocephalic and atraumatic  Eyes: EOM are normal    Neck: Normal range of motion  Neck supple  Cardiovascular: Normal rate and regular rhythm  Pulmonary/Chest: Effort normal and breath sounds normal  No respiratory distress  Abdominal: Soft  Normal appearance  He exhibits no distension  There is tenderness (mild) in the suprapubic area  There is CVA tenderness (mild bilateral)  Musculoskeletal: Normal range of motion  Back:    Neurological: He is alert and oriented to person, place, and time  Skin: Skin is warm and dry  He is not diaphoretic  Psychiatric: He has a normal mood and affect  His behavior is normal    Nursing note and vitals reviewed        Vital Signs  ED Triage Vitals   Temperature Pulse Respirations Blood Pressure SpO2   12/18/18 1757 12/18/18 1755 12/18/18 1755 12/18/18 1755 12/18/18 1755   98 1 °F (36 7 °C) 80 19 150/89 97 %      Temp Source Heart Rate Source Patient Position - Orthostatic VS BP Location FiO2 (%)   12/18/18 1757 12/18/18 1755 12/18/18 1755 12/18/18 1755 --   Oral Monitor Sitting Right arm       Pain Score       12/18/18 1810       7           Vitals:    12/18/18 1755 12/18/18 1858   BP: 150/89 127/75   Pulse: 80 71   Patient Position - Orthostatic VS: Sitting Lying       Visual Acuity      ED Medications  Medications   HYDROmorphone (DILAUDID) injection 1 mg (1 mg Intramuscular Given 12/18/18 1810)   ibuprofen (MOTRIN) tablet 600 mg (600 mg Oral Given 12/18/18 1810)   methocarbamol (ROBAXIN) tablet 500 mg (500 mg Oral Given 12/18/18 1958)       Diagnostic Studies  Results Reviewed     Procedure Component Value Units Date/Time    Urinalysis with microscopic [[id number]]  (Abnormal) Collected:  12/18/18 1810    Lab Status:  Final result Specimen:  Urine from Urine, Clean Catch Updated:  12/18/18 1831     Clarity, UA Clear     Color, UA Light Yellow     Specific Gravity, UA 1 020 pH, UA 5 5     Glucose, UA Negative mg/dl      Ketones, UA Negative mg/dl      Blood, UA Negative     Protein, UA Negative mg/dl      Nitrite, UA Negative     Bilirubin, UA Negative     Urobilinogen, UA 0 2 E U /dl      Leukocytes, UA Negative     WBC, UA None Seen /hpf      RBC, UA 0-1 (A) /hpf      Bacteria, UA None Seen /hpf      Epithelial Cells None Seen /hpf                  CT renal stone study abdomen pelvis wo contrast   Final Result by Kaylyn Galicia MD (12/18 1904)      Right midpole renal 2 mm nonobstructing calculus  No hydronephrosis  Colonic diverticulosis  Workstation performed: [id number]                    Procedures  Procedures       Phone Contacts  ED Phone Contact    ED Course                               MDM  Number of Diagnoses or Management Options  Flank pain:   Groin pain:   Lower back pain:   Diagnosis management comments: 79-year-old male with bilateral flank/lower back pain, radiation and groin, history of kidney stones  Will obtain urinalysis, stone study  Will provide analgesia and reassess  Following result of CT and UA at, discussed the other potential etiologies for pain including musculoskeletal/medication related  Will discharge with Robaxin for OP follow up with PCP/rheumatologist      CritCare Time    Disposition  Final diagnoses:   Flank pain   Groin pain   Lower back pain     Time reflects when diagnosis was documented in both MDM as applicable and the Disposition within this note     Time User Action Codes Description Comment    12/18/2018  6:03 PM Lisa Biswas Add [R10 9] Flank pain     12/18/2018  7:32 PM Lisa Biswas Add [R10 30] Groin pain     12/18/2018  7:42 PM Regina Biswas [address] [M54 5] Lower back pain       ED Disposition     ED Disposition Condition Comment    Discharge  Cy Shackle discharge to home/self care      Condition at discharge: Good        Follow-up Information     Follow up With Specialties Details Why Contact Info Additional Information    Mirela Campos MD   As needed [address] [facility] Emergency Department Emergency Medicine  If symptoms worsen [address]  [phone] [place] [facility], [address] [place] [address], [phone]          Discharge Medication List as of 12/18/2018  7:42 PM      START taking these medications    Details   methocarbamol (ROBAXIN) 500 mg tablet Take 1 tablet (500 mg total) by mouth 2 (two) times a day, Starting Tue 12/18/2018, Normal      !! naproxen (NAPROSYN) 500 mg tablet Take 1 tablet (500 mg total) by mouth 2 (two) times a day as needed for moderate pain for up to 7 days, Starting Tue 12/18/2018, Until Tue 12/25/2018, Normal       !! - Potential duplicate medications found  Please discuss with provider        CONTINUE these medications which have NOT CHANGED    Details   Adalimumab (HUMIRA PEN) 40 MG/0 8ML PNKT Inject 40 mg under the skin, Starting Wed 8/31/2016, Historical Med      aspirin 81 mg chewable tablet Chew 1 tablet (81 mg total) daily, Starting Wed 8/29/2018, Normal      Azelaic Acid (FINACEA) 15 % cream As needed, Historical Med      Dermatological Products, Misc  (ELETONE) CREA As needed, Historical Med      etanercept (ENBREL SURECLICK) 50 MG/ML injection Inject 50 mg under the skin once a week, Historical Med      gabapentin (NEURONTIN) 100 mg capsule Take 100 mg by mouth 3 (three) times a day  , Starting Wed 8/31/2016, Historical Med      hydroxychloroquine (PLAQUENIL) 200 mg tablet Take 400 mg by mouth daily at bedtime, Historical Med      leflunomide (ARAVA) 20 MG tablet Take 20 mg by mouth daily, Historical Med      metroNIDAZOLE (METROGEL) 1 % gel As needed, Historical Med      Multiple Vitamin (MULTIVITAMIN) tablet Take 1 tablet by mouth daily, Historical Med      !! naproxen (NAPROSYN) 500 mg tablet Take by mouth, Starting Thu 5/31/2012, Historical Med      !! naproxen (NAPROSYN) 500 mg tablet Take 1 tablet by mouth every 12 (twelve) hours as needed for mild pain or moderate pain, Starting Mon 11/13/2017, Print      Naproxen Sodium (ALEVE PO) Take by mouth 2 tabs in the am and 2 in the pm, Historical Med      pantoprazole (PROTONIX) 40 mg tablet Take 1 tablet (40 mg total) by mouth daily, Starting Wed 11/14/2018, Normal       !! - Potential duplicate medications found  Please discuss with provider  No discharge procedures on file      ED Provider  Electronically Signed by           Elin Gunter MD  12/19/18 6382

## 2018-12-19 NOTE — DISCHARGE INSTRUCTIONS
Back Pain   WHAT YOU NEED TO KNOW:   Back pain is common  It can be caused by many conditions, such as arthritis or the breakdown of spinal discs  Your risk for back pain is increased by injuries, lack of activity, or repeated bending and twisting  You may feel sore or stiff on one or both sides of your back  The pain may spread to your buttocks or thighs  DISCHARGE INSTRUCTIONS:   Medicines:   · NSAIDs  help decrease swelling and pain  This medicine is available with or without a doctor's order  NSAIDs can cause stomach bleeding or kidney problems in certain people  If you take blood thinner medicine, always ask your healthcare provider if NSAIDs are safe for you  Always read the medicine label and follow directions  · Acetaminophen  decreases pain  It is available without a doctor's order  Ask how much to take and how often to take it  Follow directions  Acetaminophen can cause liver damage if not taken correctly  · Prescription pain medicine  may be given  Ask your healthcare provider how to take this medicine safely  · Take your medicine as directed  Contact your healthcare provider if you think your medicine is not helping or if you have side effects  Tell him or her if you are allergic to any medicine  Keep a list of the medicines, vitamins, and herbs you take  Include the amounts, and when and why you take them  Bring the list or the pill bottles to follow-up visits  Carry your medicine list with you in case of an emergency  Follow up with your healthcare provider in 2 weeks, or as directed:  Write down your questions so you remember to ask them during your visits  How to manage your back pain:   · Apply ice  on your back or affected area for 15 to 20 minutes every hour or as directed  Use an ice pack, or put crushed ice in a plastic bag  Cover it with a towel  Ice helps prevent tissue damage and decreases pain      · Apply heat  on your back or affected area for 20 to 30 minutes every 2 hours for as many days as directed  Heat helps decrease pain and muscle spasms  · Stay active  as much as you can without causing more pain  Bed rest could make your back pain worse  Avoid heavy lifting until your pain is gone  Return to the emergency department if:   · You have pain, numbness, or weakness in one or both legs  · Your pain becomes so severe that you cannot walk  · You cannot control your urine or bowel movements  · You have severe back pain with chest pain  · You have severe back pain, nausea, and vomiting  · You have severe back pain that spreads to your side or genital area  Contact your healthcare provider if:   · You have back pain that does not get better with rest and pain medicine  · You have a fever  · You have pain that worsens when you are on your back or when you rest     · You have pain that worsens when you cough or sneeze  · You lose weight without trying  · You have questions or concerns about your condition or care  © 2017 [facility] Information is for End User's use only and may not be sold, redistributed or otherwise used for commercial purposes  All illustrations and images included in CareNotes® are the copyrighted property of [employer]  or [employer]  The above information is an  only  It is not intended as medical advice for individual conditions or treatments  Talk to your doctor, nurse or pharmacist before following any medical regimen to see if it is safe and effective for you  Flank Pain   WHAT YOU NEED TO KNOW:   Flank pain is felt in the area below your ribcage and above your hip bones, often in the lower back  Your pain may be dull or so severe that you cannot get comfortable  The pain may stay in one area or radiate to another area  It may worsen and lighten in waves  Flank pain is often a sign of problems with your urinary tract, such as a kidney stone or infection     DISCHARGE INSTRUCTIONS: Return to the emergency department if:   · You have a fever  · Your heart is fluttering or jumping  · You see blood in your urine  · Your pain radiates into your lower abdomen and genital area  · You have intense pain in your low back next to your spine  · You are much more tired than usual and have no desire to eat  · You have a headache and your muscles jerk  Contact your healthcare provider if:   · You have an upset stomach and are vomiting  · You have to urinate more often, and with urgency  · Your pain worsens or does not improve, and you cannot get comfortable  · You pass a stone when you urinate  · You have questions or concerns about your condition or care  Medicines: The following medicines may be ordered for you:  · Pain medicine  may help decrease or relieve your pain  Do not wait until the pain is severe before you take your medicine  · Antibiotics  may help treat a urinary tract infection caused by bacteria  · Take your medicine as directed  Contact your healthcare provider if you think your medicine is not helping or if you have side effects  Tell him of her if you are allergic to any medicine  Keep a list of the medicines, vitamins, and herbs you take  Include the amounts, and when and why you take them  Bring the list or the pill bottles to follow-up visits  Carry your medicine list with you in case of an emergency  Follow up with your healthcare provider in 1 to 2 days or as directed:  Write down your questions so you remember to ask them during your visits  © 2017 [address] Information is for End User's use only and may not be sold, redistributed or otherwise used for commercial purposes  All illustrations and images included in CareNotes® are the copyrighted property of [employer]  or [employer]  The above information is an  only   It is not intended as medical advice for individual conditions or treatments  Talk to your doctor, nurse or pharmacist before following any medical regimen to see if it is safe and effective for you  Acute Abdominal Pain   WHAT YOU NEED TO KNOW:   The cause of your abdominal pain may not be found  If a cause is found, treatment will depend on what the cause is  DISCHARGE INSTRUCTIONS:   Return to the emergency department if:   · You vomit blood or cannot stop vomiting  · You have blood in your bowel movement or it looks like tar  · You have bleeding from your rectum  · Your abdomen is larger than usual, more painful, and hard  · You have severe pain in your abdomen  · You stop passing gas and having bowel movements  · You feel weak, dizzy, or faint  Contact your healthcare provider if:   · You have a fever  · You have new signs and symptoms  · Your symptoms do not get better with treatment  · You have questions or concerns about your condition or care  Medicines  may be given to decrease pain, treat an infection, and manage your symptoms  Take your medicine as directed  Call your healthcare provider if you think your medicine is not helping or if you have side effects  Tell him if you are allergic to any medicine  Keep a list of the medicines, vitamins, and herbs you take  Include the amounts, and when and why you take them  Bring the list or the pill bottles to follow-up visits  Carry your medicine list with you in case of an emergency  Manage your symptoms:   · Apply heat  on your abdomen for 20 to 30 minutes every 2 hours for as many days as directed  Heat helps decrease pain and muscle spasms  · Manage your stress  Stress may cause abdominal pain  Your healthcare provider may recommend relaxation techniques and deep breathing exercises to help decrease your stress  Your healthcare provider may recommend you talk to someone about your stress or anxiety, such as a counselor or a trusted friend   Get plenty of sleep and exercise regularly  · Limit or do not drink alcohol  Alcohol can make your abdominal pain worse  Ask your healthcare provider if it is safe for you to drink alcohol  Also ask how much is safe for you to drink  · Do not smoke  Nicotine and other chemicals in cigarettes can damage your esophagus and stomach  Ask your healthcare provider for information if you currently smoke and need help to quit  E-cigarettes or smokeless tobacco still contain nicotine  Talk to your healthcare provider before you use these products  Make changes to the food you eat as directed:  Do not eat foods that cause abdominal pain or other symptoms  Eat small meals more often  · Eat more high-fiber foods if you are constipated  High-fiber foods include fruits, vegetables, whole-grain foods, and legumes  · Do not eat foods that cause gas if you have bloating  Examples include broccoli, cabbage, and cauliflower  Do not drink soda or carbonated drinks, because these may also cause gas  · Do not eat foods or drinks that contain sorbitol or fructose if you have diarrhea and bloating  Some examples are fruit juices, candy, jelly, and sugar-free gum  · Do not eat high-fat foods, such as fried foods, cheeseburgers, hot dogs, and desserts  · Limit or do not drink caffeine  Caffeine may make symptoms, such as heart burn or nausea, worse  · Drink plenty of liquids to prevent dehydration from diarrhea or vomiting  Ask your healthcare provider how much liquid to drink each day and which liquids are best for you  Follow up with your healthcare provider as directed:  Write down your questions so you remember to ask them during your visits  © 2017 [address] Information is for End User's use only and may not be sold, redistributed or otherwise used for commercial purposes  All illustrations and images included in CareNotes® are the copyrighted property of A [employer] M [employer] Inc  or [employer]    The above information is an  only  It is not intended as medical advice for individual conditions or treatments  Talk to your doctor, nurse or pharmacist before following any medical regimen to see if it is safe and effective for you

## 2018-12-20 ENCOUNTER — HOSPITAL ENCOUNTER (EMERGENCY)
Facility: HOSPITAL | Age: 43
Discharge: HOME | End: 2018-12-20
Admitting: EMERGENCY MEDICINE
Payer: COMMERCIAL

## 2018-12-20 VITALS
RESPIRATION RATE: 18 BRPM | OXYGEN SATURATION: 98 % | TEMPERATURE: 98.6 F | DIASTOLIC BLOOD PRESSURE: 73 MMHG | WEIGHT: 233.69 LBS | BODY MASS INDEX: 31.65 KG/M2 | SYSTOLIC BLOOD PRESSURE: 124 MMHG | HEART RATE: 70 BPM | HEIGHT: 72 IN

## 2018-12-20 DIAGNOSIS — M54.9 BACK PAIN: ICD-10-CM

## 2018-12-20 DIAGNOSIS — M54.16 LUMBAR RADICULOPATHY, RIGHT: Primary | ICD-10-CM

## 2018-12-20 LAB
ALBUMIN SERPL BCP-MCNC: 4.7 G/DL (ref 3.5–5)
ALP SERPL-CCNC: 57 U/L (ref 46–116)
ALT SERPL W P-5'-P-CCNC: 52 U/L (ref 12–78)
ANION GAP SERPL CALCULATED.3IONS-SCNC: 10 MMOL/L (ref 4–13)
AST SERPL W P-5'-P-CCNC: 28 U/L (ref 5–45)
BASOPHILS # BLD AUTO: 0.16 THOUSANDS/ΜL (ref 0–0.1)
BASOPHILS NFR BLD AUTO: 2 % (ref 0–1)
BILIRUB SERPL-MCNC: 0.7 MG/DL (ref 0.2–1)
BILIRUB UR QL STRIP: NEGATIVE
BUN SERPL-MCNC: 13 MG/DL (ref 5–25)
CALCIUM SERPL-MCNC: 9.7 MG/DL (ref 8.3–10.1)
CHLORIDE SERPL-SCNC: 102 MMOL/L (ref 100–108)
CLARITY UR: CLEAR
CO2 SERPL-SCNC: 27 MMOL/L (ref 21–32)
COLOR UR: NORMAL
CREAT SERPL-MCNC: 0.86 MG/DL (ref 0.6–1.3)
EOSINOPHIL # BLD AUTO: 0.32 THOUSAND/ΜL (ref 0–0.61)
EOSINOPHIL NFR BLD AUTO: 4 % (ref 0–6)
ERYTHROCYTE [DISTWIDTH] IN BLOOD BY AUTOMATED COUNT: 12.8 % (ref 11.6–15.1)
GFR SERPL CREATININE-BSD FRML MDRD: 105 ML/MIN/1.73SQ M
GLUCOSE SERPL-MCNC: 86 MG/DL (ref 65–140)
GLUCOSE UR STRIP-MCNC: NEGATIVE MG/DL
HCT VFR BLD AUTO: 52.1 % (ref 36.5–49.3)
HGB BLD-MCNC: 17.7 G/DL (ref 12–17)
HGB UR QL STRIP.AUTO: NEGATIVE
IMM GRANULOCYTES # BLD AUTO: 0.03 THOUSAND/UL (ref 0–0.2)
IMM GRANULOCYTES NFR BLD AUTO: 0 % (ref 0–2)
KETONES UR STRIP-MCNC: NEGATIVE MG/DL
LACTATE SERPL-SCNC: 1.2 MMOL/L (ref 0.5–2)
LEUKOCYTE ESTERASE UR QL STRIP: NEGATIVE
LYMPHOCYTES # BLD AUTO: 1.68 THOUSANDS/ΜL (ref 0.6–4.47)
LYMPHOCYTES NFR BLD AUTO: 21 % (ref 14–44)
MCH RBC QN AUTO: 30.1 PG (ref 26.8–34.3)
MCHC RBC AUTO-ENTMCNC: 34 G/DL (ref 31.4–37.4)
MCV RBC AUTO: 89 FL (ref 82–98)
MONOCYTES # BLD AUTO: 0.89 THOUSAND/ΜL (ref 0.17–1.22)
MONOCYTES NFR BLD AUTO: 11 % (ref 4–12)
NEUTROPHILS # BLD AUTO: 5.03 THOUSANDS/ΜL (ref 1.85–7.62)
NEUTS SEG NFR BLD AUTO: 62 % (ref 43–75)
NITRITE UR QL STRIP: NEGATIVE
NRBC BLD AUTO-RTO: 0 /100 WBCS
PH UR STRIP.AUTO: 6.5 [PH] (ref 4.5–8)
PLATELET # BLD AUTO: 288 THOUSANDS/UL (ref 149–390)
PMV BLD AUTO: 11.7 FL (ref 8.9–12.7)
POTASSIUM SERPL-SCNC: 4 MMOL/L (ref 3.5–5.3)
PROT SERPL-MCNC: 8.1 G/DL (ref 6.4–8.2)
PROT UR STRIP-MCNC: NEGATIVE MG/DL
RBC # BLD AUTO: 5.89 MILLION/UL (ref 3.88–5.62)
SODIUM SERPL-SCNC: 139 MMOL/L (ref 136–145)
SP GR UR STRIP.AUTO: <=1.005 (ref 1–1.03)
UROBILINOGEN UR QL STRIP.AUTO: 0.2 E.U./DL
WBC # BLD AUTO: 8.11 THOUSAND/UL (ref 4.31–10.16)

## 2018-12-20 PROCEDURE — 99283 EMERGENCY DEPT VISIT LOW MDM: CPT

## 2018-12-20 PROCEDURE — 81003 URINALYSIS AUTO W/O SCOPE: CPT | Performed by: PHYSICIAN ASSISTANT

## 2018-12-20 PROCEDURE — 36415 COLL VENOUS BLD VENIPUNCTURE: CPT | Performed by: PHYSICIAN ASSISTANT

## 2018-12-20 PROCEDURE — 87040 BLOOD CULTURE FOR BACTERIA: CPT | Performed by: PHYSICIAN ASSISTANT

## 2018-12-20 PROCEDURE — 96361 HYDRATE IV INFUSION ADD-ON: CPT

## 2018-12-20 PROCEDURE — 80053 COMPREHEN METABOLIC PANEL: CPT | Performed by: PHYSICIAN ASSISTANT

## 2018-12-20 PROCEDURE — 96374 THER/PROPH/DIAG INJ IV PUSH: CPT

## 2018-12-20 PROCEDURE — 96375 TX/PRO/DX INJ NEW DRUG ADDON: CPT

## 2018-12-20 PROCEDURE — 83605 ASSAY OF LACTIC ACID: CPT | Performed by: PHYSICIAN ASSISTANT

## 2018-12-20 PROCEDURE — 85025 COMPLETE CBC W/AUTO DIFF WBC: CPT | Performed by: PHYSICIAN ASSISTANT

## 2018-12-20 RX ORDER — NAPROXEN 250 MG/1
250 TABLET ORAL ONCE
Status: COMPLETED | OUTPATIENT
Start: 2018-12-20 | End: 2018-12-20

## 2018-12-20 RX ORDER — ONDANSETRON 2 MG/ML
4 INJECTION INTRAMUSCULAR; INTRAVENOUS ONCE
Status: COMPLETED | OUTPATIENT
Start: 2018-12-20 | End: 2018-12-20

## 2018-12-20 RX ORDER — PREDNISONE 20 MG/1
20 TABLET ORAL DAILY
Qty: 18 TABLET | Refills: 0 | Status: SHIPPED | OUTPATIENT
Start: 2018-12-20 | End: 2022-05-27 | Stop reason: ALTCHOICE

## 2018-12-20 RX ORDER — TRAMADOL HYDROCHLORIDE 50 MG/1
TABLET ORAL
Qty: 20 TABLET | Refills: 0 | Status: SHIPPED | OUTPATIENT
Start: 2018-12-20 | End: 2022-05-27 | Stop reason: ALTCHOICE

## 2018-12-20 RX ORDER — HYDROMORPHONE HCL/PF 1 MG/ML
1 SYRINGE (ML) INJECTION ONCE
Status: COMPLETED | OUTPATIENT
Start: 2018-12-20 | End: 2018-12-20

## 2018-12-20 RX ADMIN — SODIUM CHLORIDE 1000 ML: 0.9 INJECTION, SOLUTION INTRAVENOUS at 14:55

## 2018-12-20 RX ADMIN — ONDANSETRON 4 MG: 2 INJECTION INTRAMUSCULAR; INTRAVENOUS at 14:56

## 2018-12-20 RX ADMIN — HYDROMORPHONE HYDROCHLORIDE 1 MG: 1 INJECTION, SOLUTION INTRAMUSCULAR; INTRAVENOUS; SUBCUTANEOUS at 14:57

## 2018-12-20 RX ADMIN — NAPROXEN 250 MG: 250 TABLET ORAL at 15:36

## 2018-12-20 NOTE — ED PROVIDER NOTES
History  Chief Complaint   Patient presents with    Back Pain     pt with back and groin pain, was here for same thing on tuesday  Now having fever and chills as well      Patient is a 23-year-old male who was seen here 2 days ago for similar symptoms of right flank pain and groin pain he had full workup including labs and CT of the abdomen and pelvis which revealed a 2 mm mid pole nonobstructing renal calculus  He was feeling better in the emergency department and discharged home per patient he returns today due to persistent and worsening right low back pain that radiates into the groin  He does complain of chills but no fevers he denies nausea vomiting chest pain shortness of breath dysuria frequency hematuria testicular pain  Prior to Admission Medications   Prescriptions Last Dose Informant Patient Reported? Taking?    Adalimumab (HUMIRA PEN) 40 MG/0 8ML PNKT  Self Yes No   Sig: Inject 40 mg under the skin   Azelaic Acid (FINACEA) 15 % cream  Self Yes No   Sig: As needed   Dermatological Products, Misc  (ELETONE) CREA  Self Yes No   Sig: As needed   Multiple Vitamin (MULTIVITAMIN) tablet  Self Yes No   Sig: Take 1 tablet by mouth daily   Naproxen Sodium (ALEVE PO)  Self Yes No   Sig: Take by mouth 2 tabs in the am and 2 in the pm   aspirin 81 mg chewable tablet  Self No No   Sig: Chew 1 tablet (81 mg total) daily   etanercept (ENBREL SURECLICK) 50 MG/ML injection  Self Yes No   Sig: Inject 50 mg under the skin once a week   gabapentin (NEURONTIN) 100 mg capsule  Self Yes No   Sig: Take 100 mg by mouth 3 (three) times a day     hydroxychloroquine (PLAQUENIL) 200 mg tablet  Self Yes No   Sig: Take 400 mg by mouth daily at bedtime   leflunomide (ARAVA) 20 MG tablet  Self Yes No   Sig: Take 20 mg by mouth daily   methocarbamol (ROBAXIN) 500 mg tablet   No No   Sig: Take 1 tablet (500 mg total) by mouth 2 (two) times a day   metroNIDAZOLE (METROGEL) 1 % gel  Self Yes No   Sig: As needed   naproxen (NAPROSYN) 500 mg tablet  Self Yes No   Sig: Take by mouth   naproxen (NAPROSYN) 500 mg tablet  Self No No   Sig: Take 1 tablet by mouth every 12 (twelve) hours as needed for mild pain or moderate pain   Patient not taking: Reported on 11/14/2018    naproxen (NAPROSYN) 500 mg tablet   No No   Sig: Take 1 tablet (500 mg total) by mouth 2 (two) times a day as needed for moderate pain for up to 7 days   pantoprazole (PROTONIX) 40 mg tablet   No No   Sig: Take 1 tablet (40 mg total) by mouth daily      Facility-Administered Medications: None       Past Medical History:   Diagnosis Date    Arthritis     Chest pain     Hemorrhoids, complicated 5/52/7576    Kidney stones     Migraine     Psoriasis     Psoriatic arthritis ([facility] [address] )        Past Surgical History:   Procedure Laterality Date    HERNIA REPAIR      KIDNEY STONE SURGERY      WI COLONOSCOPY FLX DX W/COLLJ SPEC WHEN PFRMD N/A 3/10/2017    Procedure: COLONOSCOPY;  Surgeon: Debrah Crigler, MD;  Location: [facility]; Service: Colorectal    ULNAR NERVE REPAIR         Family History   Problem Relation Age of Onset    Hypertension Mother     Liver cancer Father      I have reviewed and agree with the history as documented  Social History   Substance Use Topics    Smoking status: Never Smoker    Smokeless tobacco: Never Used    Alcohol use No        Review of Systems   Constitutional: Negative for chills, diaphoresis, fatigue and fever  HENT: Negative for congestion, ear pain, rhinorrhea, sneezing and sore throat  Respiratory: Negative for cough, shortness of breath, wheezing and stridor  Cardiovascular: Negative for chest pain, palpitations and leg swelling  Gastrointestinal: Negative for abdominal distention, abdominal pain, blood in stool, constipation, diarrhea, nausea and vomiting  Genitourinary: Negative for difficulty urinating, dysuria, frequency, hematuria and urgency  Musculoskeletal: Positive for back pain   Negative for gait problem, myalgias and neck pain  Right low back pain   Skin: Negative for color change, pallor, rash and wound  Neurological: Negative for dizziness, syncope, weakness, light-headedness and headaches  All other systems reviewed and are negative  Physical Exam  Physical Exam   Constitutional: He is oriented to person, place, and time  He appears well-developed and well-nourished  No distress  HENT:   Head: Normocephalic and atraumatic  Right Ear: External ear normal    Left Ear: External ear normal    Nose: Nose normal    Mouth/Throat: Oropharynx is clear and moist    Eyes: Pupils are equal, round, and reactive to light  Conjunctivae and EOM are normal    Neck: Normal range of motion  Neck supple  No thyromegaly present  Cardiovascular: Normal rate, regular rhythm and normal heart sounds  Exam reveals no gallop and no friction rub  No murmur heard  Pulmonary/Chest: Effort normal and breath sounds normal  No stridor  Abdominal: Soft  Bowel sounds are normal  He exhibits no distension and no mass  There is tenderness  There is no rebound and no guarding  No hernia  Hernia confirmed negative in the right inguinal area  Mild right lower quadrant abdominal tenderness no rebound or guarding   Genitourinary: Testes normal and penis normal  Right testis shows no mass, no swelling and no tenderness  No penile tenderness  Musculoskeletal: He exhibits no edema, tenderness or deformity  Arms:  Lymphadenopathy:     He has no cervical adenopathy  No inguinal adenopathy noted on the right side  Neurological: He is alert and oriented to person, place, and time  Skin: He is not diaphoretic  Psychiatric: He has a normal mood and affect  His behavior is normal    Nursing note and vitals reviewed        Vital Signs  ED Triage Vitals   Temperature Pulse Respirations Blood Pressure SpO2   12/20/18 1400 12/20/18 1355 12/20/18 1355 12/20/18 1355 12/20/18 1355   98 6 °F (37 °C) 84 16 149/83 98 % Temp Source Heart Rate Source Patient Position - Orthostatic VS BP Location FiO2 (%)   12/20/18 1355 12/20/18 1355 12/20/18 1355 12/20/18 1355 --   Oral Monitor Sitting Right arm       Pain Score       12/20/18 1355       7           Vitals:    12/20/18 1355 12/20/18 1537   BP: 149/83 124/73   Pulse: 84 70   Patient Position - Orthostatic VS: Sitting Lying       Visual Acuity      ED Medications  Medications   sodium chloride 0 9 % bolus 1,000 mL (0 mL Intravenous Stopped 12/20/18 1555)   HYDROmorphone (DILAUDID) injection 1 mg (1 mg Intravenous Given 12/20/18 1457)   ondansetron (ZOFRAN) injection 4 mg (4 mg Intravenous Given 12/20/18 1456)   naproxen (NAPROSYN) tablet 250 mg (250 mg Oral Given 12/20/18 1536)       Diagnostic Studies  Results Reviewed     Procedure Component Value Units Date/Time    Lactic acid, plasma [[id number]]  (Normal) Collected:  12/20/18 1454    Lab Status:  Final result Specimen:  Blood from Arm, Right Updated:  12/20/18 1536     LACTIC ACID 1 2 mmol/L     Narrative:         Result may be elevated if tourniquet was used during collection  Comprehensive metabolic panel [[id number]] Collected:  12/20/18 1454    Lab Status:  Final result Specimen:  Blood from Arm, Right Updated:  12/20/18 1535     Sodium 139 mmol/L      Potassium 4 0 mmol/L      Chloride 102 mmol/L      CO2 27 mmol/L      ANION GAP 10 mmol/L      BUN 13 mg/dL      Creatinine 0 86 mg/dL      Glucose 86 mg/dL      Calcium 9 7 mg/dL      AST 28 U/L      ALT 52 U/L      Alkaline Phosphatase 57 U/L      Total Protein 8 1 g/dL      Albumin 4 7 g/dL      Total Bilirubin 0 70 mg/dL      eGFR 105 ml/min/1 73sq m     Narrative:         National Kidney Disease Education Program recommendations are as follows:  GFR calculation is accurate only with a steady state creatinine  Chronic Kidney disease less than 60 ml/min/1 73 sq  meters  Kidney failure less than 15 ml/min/1 73 sq  meters      UA w Reflex to Microscopic w Reflex to Culture [[id number]] Collected:  12/20/18 1458    Lab Status:  Final result Specimen:  Urine from Urine, Clean Catch Updated:  12/20/18 1519     Color, UA Light Yellow     Clarity, UA Clear     Specific Gravity, UA <=1 005     pH, UA 6 5     Leukocytes, UA Negative     Nitrite, UA Negative     Protein, UA Negative mg/dl      Glucose, UA Negative mg/dl      Ketones, UA Negative mg/dl      Urobilinogen, UA 0 2 E U /dl      Bilirubin, UA Negative     Blood, UA Negative    CBC and differential [[id number]]  (Abnormal) Collected:  12/20/18 1454    Lab Status:  Final result Specimen:  Blood from Arm, Right Updated:  12/20/18 1517     WBC 8 11 Thousand/uL      RBC 5 89 (H) Million/uL      Hemoglobin 17 7 (H) g/dL      Hematocrit 52 1 (H) %      MCV 89 fL      MCH 30 1 pg      MCHC 34 0 g/dL      RDW 12 8 %      MPV 11 7 fL      Platelets 555 Thousands/uL      nRBC 0 /100 WBCs      Neutrophils Relative 62 %      Immat GRANS % 0 %      Lymphocytes Relative 21 %      Monocytes Relative 11 %      Eosinophils Relative 4 %      Basophils Relative 2 (H) %      Neutrophils Absolute 5 03 Thousands/µL      Immature Grans Absolute 0 03 Thousand/uL      Lymphocytes Absolute 1 68 Thousands/µL      Monocytes Absolute 0 89 Thousand/µL      Eosinophils Absolute 0 32 Thousand/µL      Basophils Absolute 0 16 (H) Thousands/µL     Blood culture #2 [[id number]] Collected:  12/20/18 1507    Lab Status: In process Specimen:  Blood from Hand, Left Updated:  12/20/18 1511    Blood culture #1 [[id number]] Collected:  12/20/18 1454    Lab Status:   In process Specimen:  Blood from Arm, Right Updated:  12/20/18 1511                 No orders to display              Procedures  Procedures       Phone Contacts  ED Phone Contact    ED Course                               MDM  Number of Diagnoses or Management Options  Diagnosis management comments: Second visit in the last 2 days for right-sided low back pain that radiates into the groin on December 18th he had a full workup including a CT scan of the abdomen and pelvis for rule out kidney stone he has a 2 mm upper pole nonobstructing renal calculi otherwise remaining workup was unremarkable  On physical examination patient's pain is located more inferiorly to the kidney which begs the question is is related to a musculoskeletal etiology specifically some type of radiculopathy that simulation a referral pattern into the groin  1530: Workup is unremarkable at this time known need for additional imaging symptoms is likely more related to musculoskeletal etiology specifically a lumbar radiculopathy  Will place on a prednisone taper and Ultram and have patient follow up with his primary care physician next week  CritCare Time    Disposition  Final diagnoses:   Lumbar radiculopathy, right   Back pain     Time reflects when diagnosis was documented in both MDM as applicable and the Disposition within this note     Time User Action Codes Description Comment    12/20/2018  3:36 PM benjamin Gonzaleza Add [M54 16] Lumbar radiculopathy, right     12/20/2018  3:37 PM  Bologna Add [M54 9] Back pain       ED Disposition     ED Disposition Condition Comment    Discharge  Kailey Soto discharge to home/self care  Condition at discharge: Good        Follow-up Information    None         Discharge Medication List as of 12/20/2018  3:38 PM      START taking these medications    Details   predniSONE 20 mg tablet Take 1 tablet (20 mg total) by mouth daily Take 3 tabs for 3 days, then 2 tabs for 3 days, and 1 tab for last 3 days  , Starting Thu 12/20/2018, Print      traMADol (ULTRAM) 50 mg tablet Take 1-2 tabs p o  Q 6 hours p r n   Pain, Print         CONTINUE these medications which have NOT CHANGED    Details   Adalimumab (HUMIRA PEN) 40 MG/0 8ML PNKT Inject 40 mg under the skin, Starting Wed 8/31/2016, Historical Med      aspirin 81 mg chewable tablet Chew 1 tablet (81 mg total) daily, Starting Wed 8/29/2018, Normal Azelaic Acid (FINACEA) 15 % cream As needed, Historical Med      Dermatological Products, Misc  (ELETONE) CREA As needed, Historical Med      etanercept (ENBREL SURECLICK) 50 MG/ML injection Inject 50 mg under the skin once a week, Historical Med      gabapentin (NEURONTIN) 100 mg capsule Take 100 mg by mouth 3 (three) times a day  , Starting Wed 8/31/2016, Historical Med      hydroxychloroquine (PLAQUENIL) 200 mg tablet Take 400 mg by mouth daily at bedtime, Historical Med      leflunomide (ARAVA) 20 MG tablet Take 20 mg by mouth daily, Historical Med      methocarbamol (ROBAXIN) 500 mg tablet Take 1 tablet (500 mg total) by mouth 2 (two) times a day, Starting Tue 12/18/2018, Normal      metroNIDAZOLE (METROGEL) 1 % gel As needed, Historical Med      Multiple Vitamin (MULTIVITAMIN) tablet Take 1 tablet by mouth daily, Historical Med      !! naproxen (NAPROSYN) 500 mg tablet Take by mouth, Starting Thu 5/31/2012, Historical Med      !! naproxen (NAPROSYN) 500 mg tablet Take 1 tablet by mouth every 12 (twelve) hours as needed for mild pain or moderate pain, Starting Mon 11/13/2017, Print      !! naproxen (NAPROSYN) 500 mg tablet Take 1 tablet (500 mg total) by mouth 2 (two) times a day as needed for moderate pain for up to 7 days, Starting Tue 12/18/2018, Until Tue 12/25/2018, Normal      Naproxen Sodium (ALEVE PO) Take by mouth 2 tabs in the am and 2 in the pm, Historical Med      pantoprazole (PROTONIX) 40 mg tablet Take 1 tablet (40 mg total) by mouth daily, Starting Wed 11/14/2018, Normal       !! - Potential duplicate medications found  Please discuss with provider  No discharge procedures on file      ED Provider  Electronically Signed by           Taras Freitas PA-C  12/21/18 2645

## 2018-12-20 NOTE — DISCHARGE INSTRUCTIONS
Acute Low Back Pain   WHAT YOU NEED TO KNOW:   Acute low back pain is sudden discomfort in your lower back area that lasts for up to 6 weeks  The discomfort makes it difficult to tolerate activity  DISCHARGE INSTRUCTIONS:   Return to the emergency department if:   · You have severe pain  · You have sudden stiffness and heaviness on both buttocks down to both legs  · You have numbness or weakness in one leg, or pain in both legs  · You have numbness in your genital area or across your lower back  · You cannot control your urine or bowel movements  Contact your healthcare provider if:   · You have a fever  · You have pain at night or when you rest     · Your pain does not get better with treatment  · You have pain that worsens when you cough or sneeze  · You suddenly feel something pop or snap in your back  · You have questions or concerns about your condition or care  Medicines: The following medicines may be ordered by your healthcare provider:  · Acetaminophen  decreases pain  It is available without a doctor's order  Ask how much to take and how often to take it  Follow directions  Acetaminophen can cause liver damage if not taken correctly  · NSAIDs  help decrease swelling and pain  This medicine is available with or without a doctor's order  NSAIDs can cause stomach bleeding or kidney problems in certain people  If you take blood thinner medicine, always ask your healthcare provider if NSAIDs are safe for you  Always read the medicine label and follow directions  · Prescription pain medicine  may be given  Ask your healthcare provider how to take this medicine safely  · Muscle relaxers  decrease pain by relaxing the muscles in your lower spine  · Take your medicine as directed  Contact your healthcare provider if you think your medicine is not helping or if you have side effects  Tell him of her if you are allergic to any medicine   Keep a list of the medicines, vitamins, and herbs you take  Include the amounts, and when and why you take them  Bring the list or the pill bottles to follow-up visits  Carry your medicine list with you in case of an emergency  Self-care:   · Stay active  as much as you can without causing more pain  Bed rest could make your back pain worse  Start with some light exercises such as walking  Avoid heavy lifting until your pain is gone  Ask for more information about the activities or exercises that are right for you  · Ice  helps decrease swelling, pain, and muscle spams  Put crushed ice in a plastic bag  Cover it with a towel  Place it on your lower back for 20 to 30 minutes every 2 hours  Do this for about 2 to 3 days after your pain starts, or as directed  · Heat  helps decrease pain and muscle spasms  Start to use heat after treatment with ice has stopped  Use a small towel dampened with warm water or a heating pad, or sit in a warm bath  Apply heat on the area for 20 to 30 minutes every 2 hours for as many days as directed  Alternate heat and ice  Prevent acute low back pain:   · Use proper body mechanics  ¨ Bend at the hips and knees when you  objects  Do not bend from the waist  Use your leg muscles as you lift the load  Do not use your back  Keep the object close to your chest as you lift it  Try not to twist or lift anything above your waist     ¨ Change your position often when you stand for long periods of time  Rest one foot on a small box or footrest, and then switch to the other foot often  ¨ Try not to sit for long periods of time  When you do, sit in a straight-backed chair with your feet flat on the floor  Never reach, pull, or push while you are sitting  · Do exercises that strengthen your back muscles  Warm up before you exercise  Ask your healthcare provider the best exercises for you  · Maintain a healthy weight  Ask your healthcare provider how much you should weigh   Ask him to help you create a weight loss plan if you are overweight  Follow up with your healthcare provider as directed:  Return for a follow-up visit if you still have pain after 1 to 3 weeks of treatment  You may need to visit an orthopedist if your back pain lasts more than 12 weeks  Write down your questions so you remember to ask them during your visits  © 2017 [employer] [employer] Information is for End User's use only and may not be sold, redistributed or otherwise used for commercial purposes  All illustrations and images included in CareNotes® are the copyrighted property of [employer]  or [employer]  The above information is an  only  It is not intended as medical advice for individual conditions or treatments  Talk to your doctor, nurse or pharmacist before following any medical regimen to see if it is safe and effective for you  Lumbar Radiculopathy   WHAT YOU NEED TO KNOW:   What is lumbar radiculopathy? Lumbar radiculopathy is a painful condition that happens when a nerve in your lumbar spine (lower back) is pinched or irritated  Nerves control feeling and movement in your body  What causes lumbar radiculopathy? You may get a pinched nerve in your lumbar spine if you have disc damage  Discs are natural, spongy cushions between your vertebrae (back bones) that allow your spine to move  Your discs may move out of place and pinch the nerve in your spine  Your risk for a pinched nerve and lumbar radiculopathy increases if:  · You smoke  · You have diabetes, a spinal infection, or a growth in your spine  · You are overweight  · You are male  · You are elderly  What are the signs and symptoms of lumbar radiculopathy? You may have any of the following:  · Pain that moves from your lower back to your buttocks, groin, and the back of your leg  The pain is often felt below your knee  Your pain may worsen when you cough, sneeze, stand, or sit       · Numbness, weakness, or tingling in your back or legs  How is lumbar radiculopathy diagnosed? Your healthcare provider will examine you and ask about your family history of back and leg pain  He may also test you for weakness, numbness, or tingling in your back, buttocks, and legs  Your healthcare provider may ask you to lie on your back and lift your leg to locate your pain  You may have any of the following:  · Blood tests: You may need blood taken to give caregivers information about how your body is working  The blood may be taken from your hand, arm, or IV  · Magnetic resonance imaging (MRI): An MRI machine is used to take a picture of your lower back  Your healthcare provider will use this picture to check for problems and changes in your back bones, nerves, and discs  You will need to lie still during this test  The MRI machine contains a very powerful magnet  Never enter the MRI room with any metal objects  This can cause serious injury  Tell your healthcare provider if you have any metal implants in your body  · X-ray: An x-ray is a picture of your lower back  A lumbar x-ray may show signs of infection or other problems with your spine  · An electromyography (EMG)  test measures the electrical activity of your muscles at rest and with movement  · Computed tomography (CT) scan: A special x-ray machine uses a computer to take pictures of your lower back  It may be used to look at your bones, discs, and nerves  You may be given dye in your IV to help improve the pictures  Tell your healthcare provider if you are allergic to shellfish, or have other allergies or medical conditions  People who are allergic to iodine or shellfish (lobster, crab, or shrimp) may be allergic to some dyes  How is lumbar radiculopathy treated? Treatment of lumbar radiculopathy may reduce pain and swelling, and improve your ability to walk and do your normal activities   Ask your healthcare provider for more information about these and other treatments for lumbar radiculopathy:  · Medicines:     ¨ NSAIDs , such as ibuprofen, help decrease swelling, pain, and fever  This medicine is available with or without a doctor's order  NSAIDs can cause stomach bleeding or kidney problems in certain people  If you take blood thinner medicine, always ask your healthcare provider if NSAIDs are safe for you  Always read the medicine label and follow directions  ¨ Muscle relaxers  help decrease pain and muscle spasms  ¨ Opioids: This is a strong medicine given to reduce severe pain  It is also called narcotic pain medicine  Take this medicine exactly as directed by your healthcare provider  ¨ Oral steroids: Steroids may be given to reduce swelling and pain  ¨ Steroid injections: Healthcare providers may give you steroid medicine through a needle (shot) into your lumbar spine  This may help decrease your nerve pain and swelling  You may need more than 1 injection if your symptoms do not improve after the first treatment  · Physical therapy: Your healthcare provider may suggest physical therapy  Your physical therapist may teach you certain exercises to improve posture (the way you stand and sit), flexibility, and strength in your lower back  He may also teach you how to remain safely active and avoid further injury  Follow the exercise plan given to you by your physical therapist     · Transcutaneous electrical nerve stimulation: This treatment, called TENS, stimulates your nerves and may decrease your pain  Wires are attached to pads  The pads are attached to your skin  The wires send a mild current through your nerves  · Surgery: You may need surgery to relieve your pinched nerve if your condition has not improved within 4 to 6 weeks  You may also need it if you have lumbar radiculopathy more than once  What are the risks of treatment for lumbar radiculopathy? · Without treatment, your pain may worsen   The pinched and swollen nerve may lead to problems when you walk or move  In severe cases, you may lose control of your urine or bowel movements  Bedrest can make your symptoms worse  · Pain medicines can cause vomiting, upset stomach, constipation (large, hard bowel movements that are difficult to pass), or kidney or liver problems  Opioid medicine may be addictive (hard to quit taking once you start)  Oral steroids and steroid injections may have side effects, such as facial redness, fluid retention (water weight gain), and mood changes  Steroid injections may be painful and can cause severe headaches, infections, allergic reactions, or nerve damage  Surgery risks include delayed problems with healing, spinal or bladder infection, damage to the spinal cord or other nerves, and ongoing pain  In rare cases, you could become paralyzed (not able to move your arms or legs)  How can I care for myself when I have lumbar radiculopathy? · Stay active: It is best to be active when you have lumbar radiculopathy  Your healthcare provider may tell you to take walks to ease yourself back into your daily routine  Avoid long periods of bed rest  Bed rest could worsen your symptoms  Do not move in ways that increase your pain  Ask your healthcare provider for more information about the best ways to stay active  · Use ice or heat packs:  Use ice or heat packs on the sore area of your body to decrease the pain and swelling  Put ice in a plastic bag covered with a towel on your low back  Cover heated items with a towel to avoid burns  Use ice and heat as directed  · Avoid heavy lifting: Your condition may worsen if you lift heavy things  Avoid lifting if possible  · Maintain a healthy weight:  Excess body weight may strain your back  Talk with your healthcare provider about ways to lose excess weight if you are overweight  When should I contact my healthcare provider? · Your pain does not improve within 1 to 3 weeks after treatment       · Your pain and weakness keep you from your normal activities at work, home, or school  · You lose more than 10 pounds in 6 months without trying  · You become depressed or sad because of the pain  · You have questions or concerns about your condition or care  When should I seek immediate care or call 911? · You have a fever greater than 100 4°F for longer than 2 days  · You have new, severe back or leg pain, or your pain spreads to both legs  · You have any new signs of numbness or weakness, especially in your lower back, legs, arms, or genital area  · You have new trouble controlling your urine and bowel movements  · You do not feel like your bladder empties when you urinate  CARE AGREEMENT:   You have the right to help plan your care  Learn about your health condition and how it may be treated  Discuss treatment options with your caregivers to decide what care you want to receive  You always have the right to refuse treatment  The above information is an  only  It is not intended as medical advice for individual conditions or treatments  Talk to your doctor, nurse or pharmacist before following any medical regimen to see if it is safe and effective for you  © 2017 [facility] Information is for End User's use only and may not be sold, redistributed or otherwise used for commercial purposes  All illustrations and images included in CareNotes® are the copyrighted property of A [employer] M [employer] Inc  or [address]

## 2018-12-25 LAB
BACTERIA BLD CULT: NORMAL
BACTERIA BLD CULT: NORMAL

## 2019-07-26 ENCOUNTER — HOSPITAL ENCOUNTER (OUTPATIENT)
Dept: RADIOLOGY | Facility: HOSPITAL | Age: 44
Discharge: HOME | End: 2019-07-26
Payer: COMMERCIAL

## 2019-07-26 ENCOUNTER — TRANSCRIBE ORDERS (OUTPATIENT)
Dept: ADMINISTRATIVE | Facility: HOSPITAL | Age: 44
End: 2019-07-26

## 2019-07-26 DIAGNOSIS — L40.50 PSORIATIC ARTHROPATHY (HCC): ICD-10-CM

## 2019-07-26 DIAGNOSIS — L40.50 PSORIATIC ARTHROPATHY (HCC): Primary | ICD-10-CM

## 2019-07-26 PROCEDURE — 72040 X-RAY EXAM NECK SPINE 2-3 VW: CPT

## 2021-03-10 DIAGNOSIS — Z23 ENCOUNTER FOR IMMUNIZATION: ICD-10-CM

## 2021-09-09 ENCOUNTER — EVALUATION (OUTPATIENT)
Dept: OCCUPATIONAL THERAPY | Facility: CLINIC | Age: 46
End: 2021-09-09
Payer: OTHER MISCELLANEOUS

## 2021-09-09 DIAGNOSIS — M79.602 PAIN OF LEFT UPPER EXTREMITY: ICD-10-CM

## 2021-09-09 DIAGNOSIS — S14.3XXD INJURY OF BRACHIAL PLEXUS, SUBSEQUENT ENCOUNTER: Primary | ICD-10-CM

## 2021-09-09 PROCEDURE — 97165 OT EVAL LOW COMPLEX 30 MIN: CPT | Performed by: OCCUPATIONAL THERAPIST

## 2021-09-09 PROCEDURE — 97110 THERAPEUTIC EXERCISES: CPT | Performed by: OCCUPATIONAL THERAPIST

## 2021-09-09 NOTE — LETTER
September 10, 2021    Satnam Cordon MD  [facility]    Patient: Katie Franz   YOB: 1975   Date of Visit: 2021     Encounter Diagnosis     ICD-10-CM    1  Injury of brachial plexus, subsequent encounter  S14  3XXD    2  Pain of left upper extremity  M79 [id number]        Dear Dr Nick De La Garza:    Thank you for your recent referral of Katie Franz  Please review the attached evaluation summary from Cory's recent visit  Please verify that you agree with the plan of care by signing the attached order  If you have any questions or concerns, please do not hesitate to call  I sincerely appreciate the opportunity to share in the care of one of your patients and hope to have another opportunity to work with you in the near future  Sincerely,    Bj Munoz, OT      Referring Provider:     I certify that I have read the below Plan of Care and certify the need for these services furnished under this plan of treatment while under my care  Satnam Cordon MD  [address] [address]  Via Fax: [phone]        OT Evaluation     Today's date: 2021  Patient name: Katie Franz  : 1975  MRN: [id number]  Referring provider: Tyrell Uriarte MD  Dx:   Encounter Diagnosis     ICD-10-CM    1  Injury of brachial plexus, subsequent encounter  S14  3XXD    2  Pain of left upper extremity  M79 [id number]        Start Time: 1630  Stop Time: 1715  Total time in clinic (min): 45 minutes    Assessment  Assessment details: Papa Velazquez is a 52 y/o left handed male who was originally injured at work on 2016  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 and slipped and fell onto his left shoulder, injuring the bracial plexus  Since his injury date, he has received many forms of care including physical therapy, injections, pain management    He has had ongoing EMG's with next scheduled in December 2021  He presents today with full LUE AROM, no atrophy of the intrinsics, normal color and temperature of the hand, and no edema today but he reports occasional edema which will reduce his ROM  Pain is daily and constant and at a moderate level  His  and pinch  strength are functional and he has returned to work on full duty  He is currently taking medications for RA/psoriatic arthritis  Evaluation is of low complexity, due to minimal comorbidities and stable clinical presentation  Pt demonstrates good tolerance of therapy today and was provided with a written HEP focusing on posture, NGE, and desensitization  Pt was instructed to perform HEP daily as tolerated  The patient demonstrates HEP instructions appropriately, verbalizes understanding, and is in agreement with the written HEP  Pt will benefit from skilled OT intervention to progress strength, reduce pain,  and allow return to PLOF  Impairments: impaired physical strength and lacks appropriate home exercise program  Functional limitations: Pain increases with exertion  Symptom irritability: moderateUnderstanding of Dx/Px/POC: good   Prognosis: good    Goals  STG - 2 weeks  Reduce resting pain to less than 5/10  HEP compliance of stretches and glides  HEP compliance in wearing brace/strap as instructed  LTG  Achieve premorbid AROM of Fa/wrist/hand  Pain at rest less than 2/10   strength to at least 50% of uninvolved  Resolve sensory complaints  Return to PLOF with symptom control           Plan  Patient would benefit from: OT eval and skilled occupational therapy  Planned modality interventions: thermotherapy: hydrocollator packs  Planned therapy interventions: manual therapy, joint mobilization, patient education, sensory integrative techniques, strengthening, stretching, therapeutic activities, therapeutic exercise, home exercise program, graded exercise, graded activity and functional ROM exercises  Frequency: 2x week  Duration in weeks: 6  Plan of Care beginning date: 2021  Plan of Care expiration date: 10/24/2021  Treatment plan discussed with: patient        Subjective Evaluation    History of Present Illness  Mechanism of injury: trauma  Mechanism of injury: 2016          Not a recurrent problem   Quality of life: good    Pain  Current pain ratin  At best pain ratin  At worst pain ratin  Location: left arm and hand  Quality: pressure, throbbing and dull ache  Relieving factors: rest  Progression: improved    Social Support    Employment status: working (Has RTW;  PME, 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[employer])  Hand dominance: left      Diagnostic Tests  EMG: abnormal  Treatments  Previous treatment: physical therapy, occupational therapy, medication and immobilization (pain management )  Current treatment: medication and occupational therapy  Patient Goals  Patient goals for therapy: increased strength, decreased pain and return to sport/leisure activities  Patient goal: less pain walking dogs        Objective     Observations     Left Wrist/Hand   Negative for atrophy and edema  Neurological Testing     Sensation     Wrist/Hand   Left   Intact: light touch and static two point discrimination    Additional Neurological Details  Able to cross finger, abduct fifth;  (-) Froments  No palsy demonstrated  No atrophy of thenar or intrinsices    Active Range of Motion     Left Wrist   Normal active range of motion    Left Thumb   Opposition: WNL, no restrictions    Strength/Myotome Testing     Additional Strength Details  Malvin #2  L  88 lbs,  R  100 lbs    Lat pinch  L  10 5 lbs,  R  13 7 lbs  Precautions:  RA, psoriatic arthritis;   Restless legs      Date             Visit 1            Manuals                                                                 Neuro Re-Ed                                                                                                        Ther Ex 10' HEP NGE, desens, scap glides  10'                                                                                                       Ther Activity                                       Gait Training                                       Modalities

## 2021-09-09 NOTE — PROGRESS NOTES
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 and fell onto his left shoulder, injuring the bracial plexus  Since his injury date, he has received many forms of care including physical therapy, injections, pain management  He has had ongoing EMG's with next scheduled in 2021  He presents today with full LUE AROM, no atrophy of the intrinsics, normal color and temperature of the hand, and no edema today but he reports occasional edema which will reduce his ROM  Pain is daily and constant and at a moderate level  His  and pinch  strength are functional and he has returned to work on full duty  He is currently taking medications for RA/psoriatic arthritis  Evaluation is of low complexity, due to minimal comorbidities and stable clinical presentation  Pt demonstrates good tolerance of therapy today and was provided with a written HEP focusing on posture, NGE, and desensitization  Pt was instructed to perform HEP daily as tolerated  The patient demonstrates HEP instructions appropriately, verbalizes understanding, and is in agreement with the written HEP  Pt will benefit from skilled OT intervention to progress strength, reduce pain,  and allow return to PLOF          Impairments: impaired physical strength and lacks appropriate home exercise program  Functional limitations: Pain increases with exertion  Symptom irritability: moderateUnderstanding of Dx/Px/POC: good   Prognosis: good    Goals  STG - 2 weeks  Reduce resting pain to less than 5/10  HEP compliance of stretches and glides  HEP compliance in wearing brace/strap as instructed  LTG  Achieve premorbid AROM of Fa/wrist/hand  Pain at rest less than 2/10   strength to at least 50% of uninvolved  Resolve sensory complaints  Return to PLOF with symptom control  Plan  Patient would benefit from: OT eval and skilled occupational therapy  Planned modality interventions: thermotherapy: hydrocollator packs  Planned therapy interventions: manual therapy, joint mobilization, patient education, sensory integrative techniques, strengthening, stretching, therapeutic activities, therapeutic exercise, home exercise program, graded exercise, graded activity and functional ROM exercises  Frequency: 2x week  Duration in weeks: 6  Plan of Care beginning date: 2021  Plan of Care expiration date: 10/24/2021  Treatment plan discussed with: patient        Subjective Evaluation    History of Present Illness  Mechanism of injury: trauma  Mechanism of injury: 2016          Not a recurrent problem   Quality of life: good    Pain  Current pain ratin  At best pain ratin  At worst pain ratin  Location: left arm and hand  Quality: pressure, throbbing and dull ache  Relieving factors: rest  Progression: improved    Social Support    Employment status: working (Has RTW;  PME, TA/skilled nursing)  Hand dominance: left      Diagnostic Tests  EMG: abnormal  Treatments  Previous treatment: physical therapy, occupational therapy, medication and immobilization (pain management )  Current treatment: medication and occupational therapy  Patient Goals  Patient goals for therapy: increased strength, decreased pain and return to sport/leisure activities  Patient goal: less pain walking dogs        Objective     Observations     Left Wrist/Hand   Negative for atrophy and edema       Neurological Testing     Sensation     Wrist/Hand Left   Intact: light touch and static two point discrimination    Additional Neurological Details  Able to cross finger, abduct fifth;  (-) Froments  No palsy demonstrated  No atrophy of thenar or intrinsices    Active Range of Motion     Left Wrist   Normal active range of motion    Left Thumb   Opposition: WNL, no restrictions    Strength/Myotome Testing     Additional Strength Details  Malvin #2  L  88 lbs,  R  100 lbs    Lat pinch  L  10 5 lbs,  R  13 7 lbs  Precautions:  RA, psoriatic arthritis;   Restless legs      Date 9/9            Visit 1            Manuals                                                                 Neuro Re-Ed                                                                                                        Ther Ex 10'            HEP SIRENA, renetta wang  10'                                                                                                       Ther Activity                                       Gait Training                                       Modalities

## 2021-09-14 ENCOUNTER — OFFICE VISIT (OUTPATIENT)
Dept: OCCUPATIONAL THERAPY | Facility: CLINIC | Age: 46
End: 2021-09-14
Payer: OTHER MISCELLANEOUS

## 2021-09-14 DIAGNOSIS — M79.602 PAIN OF LEFT UPPER EXTREMITY: ICD-10-CM

## 2021-09-14 DIAGNOSIS — S14.3XXD INJURY OF BRACHIAL PLEXUS, SUBSEQUENT ENCOUNTER: Primary | ICD-10-CM

## 2021-09-14 PROCEDURE — 97110 THERAPEUTIC EXERCISES: CPT | Performed by: OCCUPATIONAL THERAPIST

## 2021-09-14 NOTE — PROGRESS NOTES
Daily Note /Discharge     Today's date: 2021  Patient name: Efrain Quintero  : 1975  MRN: [id number]  Referring provider: Vega Holland MD  Dx:   Encounter Diagnosis     ICD-10-CM    1  Injury of brachial plexus, subsequent encounter  S14  3XXD    2  Pain of left upper extremity  M79 602      DISCHARGE:  Lemuel Taylor was seen for his IE and one f/u visit for UE strengthening  Severo's pain was extending up into his neck and causing headaches  Therapy will continue in physical therapy to address his shoulder, cervical pain, and headaches  Hand therapy services will be discharge to [facility] at this time  No further hand therapy appointment are scheduled  Subjective: " The exercises didn't seem to make any difference"  Scap retracts, desensitizations      Objective: See treatment diary below; Add HEP for shoulder T bands and weight PRE's and brachial plexus gliding  Assessment: Tolerated treatment well  Patient  reports cervical pain and headaches radiating from the brachial plexus injury  No change of pain with current HEP     Plan: Patient will follow HEP for nerve glides and PRE's  A new prescription for PT care has been requested from the referring physician to address cervical pain and UE pain  A PT evaluation will be scheduled once referral is received  OT services will be discontinued  Precautions:  RA, psoriatic arthritis;   Restless legs      Date            Visit 1 2           Manuals                                                                 Neuro Re-Ed                                                                                                        Ther Ex 10'   30'           HEP NGE, desens, scap glides  10' New HEP  Br Plexus glides, T band and weight UE PRE's  30'                                                                                                         Ther Activity                                       Gait Training Modalities

## 2021-09-21 ENCOUNTER — APPOINTMENT (OUTPATIENT)
Dept: OCCUPATIONAL THERAPY | Facility: CLINIC | Age: 46
End: 2021-09-21
Payer: OTHER MISCELLANEOUS

## 2021-09-28 ENCOUNTER — APPOINTMENT (OUTPATIENT)
Dept: OCCUPATIONAL THERAPY | Facility: CLINIC | Age: 46
End: 2021-09-28
Payer: OTHER MISCELLANEOUS

## 2021-10-31 ENCOUNTER — NURSE TRIAGE (OUTPATIENT)
Dept: OTHER | Facility: OTHER | Age: 46
End: 2021-10-31

## 2021-10-31 DIAGNOSIS — Z11.59 SPECIAL SCREENING EXAMINATION FOR VIRAL DISEASE: Primary | ICD-10-CM

## 2021-10-31 PROCEDURE — U0003 INFECTIOUS AGENT DETECTION BY NUCLEIC ACID (DNA OR RNA); SEVERE ACUTE RESPIRATORY SYNDROME CORONAVIRUS 2 (SARS-COV-2) (CORONAVIRUS DISEASE [COVID-19]), AMPLIFIED PROBE TECHNIQUE, MAKING USE OF HIGH THROUGHPUT TECHNOLOGIES AS DESCRIBED BY CMS-2020-01-R: HCPCS | Performed by: FAMILY MEDICINE

## 2021-10-31 PROCEDURE — U0005 INFEC AGEN DETEC AMPLI PROBE: HCPCS | Performed by: FAMILY MEDICINE

## 2021-11-01 ENCOUNTER — TELEPHONE (OUTPATIENT)
Dept: OTHER | Facility: OTHER | Age: 46
End: 2021-11-01

## 2021-12-30 ENCOUNTER — HOSPITAL ENCOUNTER (OUTPATIENT)
Dept: MRI IMAGING | Facility: HOSPITAL | Age: 46
Discharge: HOME | End: 2021-12-30
Payer: COMMERCIAL

## 2021-12-30 DIAGNOSIS — M54.2 CERVICALGIA: ICD-10-CM

## 2021-12-30 PROCEDURE — 72141 MRI NECK SPINE W/O DYE: CPT

## 2022-02-05 ENCOUNTER — APPOINTMENT (OUTPATIENT)
Dept: LAB | Facility: HOSPITAL | Age: 47
End: 2022-02-05
Payer: COMMERCIAL

## 2022-02-05 DIAGNOSIS — E78.1 HYPERTRIGLYCERIDEMIA: ICD-10-CM

## 2022-02-05 LAB
ALBUMIN SERPL BCP-MCNC: 4.3 G/DL (ref 3.5–5)
ALP SERPL-CCNC: 55 U/L (ref 46–116)
ALT SERPL W P-5'-P-CCNC: 35 U/L (ref 12–78)
ANION GAP SERPL CALCULATED.3IONS-SCNC: 8 MMOL/L (ref 4–13)
AST SERPL W P-5'-P-CCNC: 23 U/L (ref 5–45)
BILIRUB SERPL-MCNC: 0.88 MG/DL (ref 0.2–1)
BUN SERPL-MCNC: 18 MG/DL (ref 5–25)
CALCIUM SERPL-MCNC: 8.9 MG/DL (ref 8.3–10.1)
CHLORIDE SERPL-SCNC: 105 MMOL/L (ref 100–108)
CHOLEST SERPL-MCNC: 189 MG/DL
CO2 SERPL-SCNC: 27 MMOL/L (ref 21–32)
CREAT SERPL-MCNC: 0.98 MG/DL (ref 0.6–1.3)
ERYTHROCYTE [DISTWIDTH] IN BLOOD BY AUTOMATED COUNT: 13.1 % (ref 11.6–15.1)
GFR SERPL CREATININE-BSD FRML MDRD: 91 ML/MIN/1.73SQ M
GLUCOSE P FAST SERPL-MCNC: 98 MG/DL (ref 65–99)
HCT VFR BLD AUTO: 50.1 % (ref 36.5–49.3)
HDLC SERPL-MCNC: 42 MG/DL
HGB BLD-MCNC: 16.6 G/DL (ref 12–17)
LDLC SERPL CALC-MCNC: 107 MG/DL (ref 0–100)
MCH RBC QN AUTO: 29.7 PG (ref 26.8–34.3)
MCHC RBC AUTO-ENTMCNC: 33.1 G/DL (ref 31.4–37.4)
MCV RBC AUTO: 90 FL (ref 82–98)
NONHDLC SERPL-MCNC: 147 MG/DL
PLATELET # BLD AUTO: 277 THOUSANDS/UL (ref 149–390)
PMV BLD AUTO: 12.2 FL (ref 8.9–12.7)
POTASSIUM SERPL-SCNC: 3.9 MMOL/L (ref 3.5–5.3)
PROT SERPL-MCNC: 7.5 G/DL (ref 6.4–8.2)
RBC # BLD AUTO: 5.58 MILLION/UL (ref 3.88–5.62)
SODIUM SERPL-SCNC: 140 MMOL/L (ref 136–145)
TRIGL SERPL-MCNC: 201 MG/DL
WBC # BLD AUTO: 6.96 THOUSAND/UL (ref 4.31–10.16)

## 2022-02-05 PROCEDURE — 85027 COMPLETE CBC AUTOMATED: CPT

## 2022-02-05 PROCEDURE — 80053 COMPREHEN METABOLIC PANEL: CPT

## 2022-02-05 PROCEDURE — 80061 LIPID PANEL: CPT

## 2022-02-05 PROCEDURE — 36415 COLL VENOUS BLD VENIPUNCTURE: CPT

## 2022-04-15 ENCOUNTER — LAB REQUISITION (OUTPATIENT)
Dept: LAB | Facility: HOSPITAL | Age: 47
End: 2022-04-15
Payer: COMMERCIAL

## 2022-04-15 DIAGNOSIS — K63.5 POLYP OF COLON: ICD-10-CM

## 2022-04-15 PROCEDURE — 88305 TISSUE EXAM BY PATHOLOGIST: CPT | Performed by: PATHOLOGY

## 2022-05-09 ENCOUNTER — APPOINTMENT (EMERGENCY)
Dept: CT IMAGING | Facility: HOSPITAL | Age: 47
End: 2022-05-09
Payer: COMMERCIAL

## 2022-05-09 ENCOUNTER — HOSPITAL ENCOUNTER (EMERGENCY)
Facility: HOSPITAL | Age: 47
Discharge: HOME | End: 2022-05-09
Attending: EMERGENCY MEDICINE | Admitting: EMERGENCY MEDICINE
Payer: COMMERCIAL

## 2022-05-09 VITALS
SYSTOLIC BLOOD PRESSURE: 126 MMHG | HEART RATE: 69 BPM | OXYGEN SATURATION: 96 % | TEMPERATURE: 98.7 F | RESPIRATION RATE: 19 BRPM | DIASTOLIC BLOOD PRESSURE: 75 MMHG

## 2022-05-09 DIAGNOSIS — K29.70 GASTRITIS: Primary | ICD-10-CM

## 2022-05-09 DIAGNOSIS — R10.9 ABDOMINAL PAIN: ICD-10-CM

## 2022-05-09 LAB
ALBUMIN SERPL BCP-MCNC: 4.4 G/DL (ref 3.5–5)
ALP SERPL-CCNC: 60 U/L (ref 46–116)
ALT SERPL W P-5'-P-CCNC: 45 U/L (ref 12–78)
ANION GAP SERPL CALCULATED.3IONS-SCNC: 11 MMOL/L (ref 4–13)
AST SERPL W P-5'-P-CCNC: 28 U/L (ref 5–45)
ATRIAL RATE: 74 BPM
BASOPHILS # BLD AUTO: 0.07 THOUSANDS/ΜL (ref 0–0.1)
BASOPHILS NFR BLD AUTO: 1 % (ref 0–1)
BILIRUB SERPL-MCNC: 1.07 MG/DL (ref 0.2–1)
BUN SERPL-MCNC: 22 MG/DL (ref 5–25)
CALCIUM SERPL-MCNC: 8.9 MG/DL (ref 8.3–10.1)
CARDIAC TROPONIN I PNL SERPL HS: <2 NG/L
CHLORIDE SERPL-SCNC: 106 MMOL/L (ref 100–108)
CO2 SERPL-SCNC: 24 MMOL/L (ref 21–32)
CREAT SERPL-MCNC: 0.96 MG/DL (ref 0.6–1.3)
EOSINOPHIL # BLD AUTO: 0.13 THOUSAND/ΜL (ref 0–0.61)
EOSINOPHIL NFR BLD AUTO: 2 % (ref 0–6)
ERYTHROCYTE [DISTWIDTH] IN BLOOD BY AUTOMATED COUNT: 13.2 % (ref 11.6–15.1)
GFR SERPL CREATININE-BSD FRML MDRD: 93 ML/MIN/1.73SQ M
GLUCOSE SERPL-MCNC: 91 MG/DL (ref 65–140)
HCT VFR BLD AUTO: 48.9 % (ref 36.5–49.3)
HGB BLD-MCNC: 16.7 G/DL (ref 12–17)
IMM GRANULOCYTES # BLD AUTO: 0.01 THOUSAND/UL (ref 0–0.2)
IMM GRANULOCYTES NFR BLD AUTO: 0 % (ref 0–2)
LIPASE SERPL-CCNC: 120 U/L (ref 73–393)
LYMPHOCYTES # BLD AUTO: 0.89 THOUSANDS/ΜL (ref 0.6–4.47)
LYMPHOCYTES NFR BLD AUTO: 14 % (ref 14–44)
MCH RBC QN AUTO: 30.3 PG (ref 26.8–34.3)
MCHC RBC AUTO-ENTMCNC: 34.2 G/DL (ref 31.4–37.4)
MCV RBC AUTO: 89 FL (ref 82–98)
MONOCYTES # BLD AUTO: 0.69 THOUSAND/ΜL (ref 0.17–1.22)
MONOCYTES NFR BLD AUTO: 11 % (ref 4–12)
NEUTROPHILS # BLD AUTO: 4.48 THOUSANDS/ΜL (ref 1.85–7.62)
NEUTS SEG NFR BLD AUTO: 72 % (ref 43–75)
NRBC BLD AUTO-RTO: 0 /100 WBCS
P AXIS: 44 DEGREES
PLATELET # BLD AUTO: 289 THOUSANDS/UL (ref 149–390)
PMV BLD AUTO: 11.5 FL (ref 8.9–12.7)
POTASSIUM SERPL-SCNC: 3.8 MMOL/L (ref 3.5–5.3)
PR INTERVAL: 166 MS
PROT SERPL-MCNC: 7.1 G/DL (ref 6.4–8.2)
QRS AXIS: 70 DEGREES
QRSD INTERVAL: 98 MS
QT INTERVAL: 408 MS
QTC INTERVAL: 452 MS
RBC # BLD AUTO: 5.51 MILLION/UL (ref 3.88–5.62)
SODIUM SERPL-SCNC: 141 MMOL/L (ref 136–145)
T WAVE AXIS: 20 DEGREES
VENTRICULAR RATE: 74 BPM
WBC # BLD AUTO: 6.27 THOUSAND/UL (ref 4.31–10.16)

## 2022-05-09 PROCEDURE — 74176 CT ABD & PELVIS W/O CONTRAST: CPT

## 2022-05-09 PROCEDURE — 93005 ELECTROCARDIOGRAM TRACING: CPT

## 2022-05-09 PROCEDURE — 99284 EMERGENCY DEPT VISIT MOD MDM: CPT | Performed by: EMERGENCY MEDICINE

## 2022-05-09 PROCEDURE — 36415 COLL VENOUS BLD VENIPUNCTURE: CPT | Performed by: EMERGENCY MEDICINE

## 2022-05-09 PROCEDURE — 96374 THER/PROPH/DIAG INJ IV PUSH: CPT

## 2022-05-09 PROCEDURE — 93010 ELECTROCARDIOGRAM REPORT: CPT | Performed by: INTERNAL MEDICINE

## 2022-05-09 PROCEDURE — 99284 EMERGENCY DEPT VISIT MOD MDM: CPT

## 2022-05-09 PROCEDURE — 80053 COMPREHEN METABOLIC PANEL: CPT | Performed by: EMERGENCY MEDICINE

## 2022-05-09 PROCEDURE — 84484 ASSAY OF TROPONIN QUANT: CPT | Performed by: EMERGENCY MEDICINE

## 2022-05-09 PROCEDURE — 83690 ASSAY OF LIPASE: CPT | Performed by: EMERGENCY MEDICINE

## 2022-05-09 PROCEDURE — 85025 COMPLETE CBC W/AUTO DIFF WBC: CPT | Performed by: EMERGENCY MEDICINE

## 2022-05-09 RX ORDER — FAMOTIDINE 10 MG/ML
20 INJECTION, SOLUTION INTRAVENOUS ONCE
Status: COMPLETED | OUTPATIENT
Start: 2022-05-09 | End: 2022-05-09

## 2022-05-09 RX ORDER — MAGNESIUM HYDROXIDE/ALUMINUM HYDROXICE/SIMETHICONE 120; 1200; 1200 MG/30ML; MG/30ML; MG/30ML
30 SUSPENSION ORAL ONCE
Status: COMPLETED | OUTPATIENT
Start: 2022-05-09 | End: 2022-05-09

## 2022-05-09 RX ORDER — SUCRALFATE ORAL 1 G/10ML
1 SUSPENSION ORAL 4 TIMES DAILY
Qty: 414 ML | Refills: 0 | Status: SHIPPED | OUTPATIENT
Start: 2022-05-09

## 2022-05-09 RX ORDER — FAMOTIDINE 20 MG/1
20 TABLET, FILM COATED ORAL 2 TIMES DAILY
Qty: 30 TABLET | Refills: 0 | Status: SHIPPED | OUTPATIENT
Start: 2022-05-09

## 2022-05-09 RX ADMIN — FAMOTIDINE 20 MG: 10 INJECTION, SOLUTION INTRAVENOUS at 11:47

## 2022-05-09 RX ADMIN — ALUMINUM HYDROXIDE, MAGNESIUM HYDROXIDE, AND SIMETHICONE 30 ML: 200; 200; 20 SUSPENSION ORAL at 11:47

## 2022-05-09 NOTE — ED PROVIDER NOTES
History  Chief Complaint   Patient presents with    Epigastric Pain     pt c/o epigastric pain since friday that has since worsened  pt states pain increases after meals  pt also states increased BMs     HPI  70-year-old male presents with left upper quadrant/epigastric pain that radiates throughout abdomen, started three days ago  Worse with eating  Denies nausea, vomiting, diarrhea, constipation, fevers, urinary symptoms or blood in stool  Reports taking aleve twice daily for arthritis  Prior to Admission Medications   Prescriptions Last Dose Informant Patient Reported? Taking?    Adalimumab (HUMIRA PEN) 40 MG/0 8ML PNKT  Self Yes No   Sig: Inject 40 mg under the skin   Azelaic Acid (FINACEA) 15 % cream  Self Yes No   Sig: As needed   Dermatological Products, Misc  (ELETONE) CREA  Self Yes No   Sig: As needed   Multiple Vitamin (MULTIVITAMIN) tablet  Self Yes No   Sig: Take 1 tablet by mouth daily   Naproxen Sodium (ALEVE PO)  Self Yes No   Sig: Take by mouth 2 tabs in the am and 2 in the pm   aspirin 81 mg chewable tablet  Self No No   Sig: Chew 1 tablet (81 mg total) daily   etanercept (ENBREL SURECLICK) 50 MG/ML injection  Self Yes No   Sig: Inject 50 mg under the skin once a week   gabapentin (NEURONTIN) 100 mg capsule  Self Yes No   Sig: Take 100 mg by mouth 3 (three) times a day     hydroxychloroquine (PLAQUENIL) 200 mg tablet  Self Yes No   Sig: Take 400 mg by mouth daily at bedtime   leflunomide (ARAVA) 20 MG tablet  Self Yes No   Sig: Take 20 mg by mouth daily   methocarbamol (ROBAXIN) 500 mg tablet   No No   Sig: Take 1 tablet (500 mg total) by mouth 2 (two) times a day   metroNIDAZOLE (METROGEL) 1 % gel  Self Yes No   Sig: As needed   naproxen (NAPROSYN) 500 mg tablet  Self Yes No   Sig: Take by mouth   naproxen (NAPROSYN) 500 mg tablet  Self No No   Sig: Take 1 tablet by mouth every 12 (twelve) hours as needed for mild pain or moderate pain   Patient not taking: Reported on 11/14/2018    pantoprazole (PROTONIX) 40 mg tablet   No No   Sig: Take 1 tablet (40 mg total) by mouth daily   predniSONE 20 mg tablet   No No   Sig: Take 1 tablet (20 mg total) by mouth daily Take 3 tabs for 3 days, then 2 tabs for 3 days, and 1 tab for last 3 days  traMADol (ULTRAM) 50 mg tablet   No No   Sig: Take 1-2 tabs p o  Q 6 hours p r n  Pain      Facility-Administered Medications: None       Past Medical History:   Diagnosis Date    Arthritis     Chest pain     Hemorrhoids, complicated 2/74/8418    Kidney stones     Migraine     Psoriasis     Psoriatic arthritis ([facility] [address] )        Past Surgical History:   Procedure Laterality Date    HERNIA REPAIR      KIDNEY STONE SURGERY      IN COLONOSCOPY FLX DX W/COLLJ SPEC WHEN PFRMD N/A 3/10/2017    Procedure: COLONOSCOPY;  Surgeon: Braden Pineda MD;  Location: [facility]; Service: Colorectal    ULNAR NERVE REPAIR         Family History   Problem Relation Age of Onset    Hypertension Mother     Liver cancer Father      I have reviewed and agree with the history as documented  E-Cigarette/Vaping     E-Cigarette/Vaping Substances     Social History     Tobacco Use    Smoking status: Never Smoker    Smokeless tobacco: Never Used   Substance Use Topics    Alcohol use: No    Drug use: No       Review of Systems   Constitutional: Negative for chills and fever  HENT: Negative for dental problem and ear pain  Eyes: Negative for pain and redness  Respiratory: Negative for cough and shortness of breath  Cardiovascular: Negative for chest pain and palpitations  Gastrointestinal: Positive for abdominal pain  Negative for nausea  Endocrine: Negative for polydipsia and polyphagia  Genitourinary: Negative for dysuria and frequency  Musculoskeletal: Negative for arthralgias and joint swelling  Skin: Negative for color change and rash  Neurological: Negative for dizziness and headaches  Psychiatric/Behavioral: Negative for behavioral problems and confusion     All other systems reviewed and are negative  Physical Exam  Physical Exam  Vitals and nursing note reviewed  Constitutional:       General: He is not in acute distress  Appearance: He is well-developed  He is not diaphoretic  HENT:      Head: Atraumatic  Right Ear: External ear normal       Left Ear: External ear normal       Nose: Nose normal    Eyes:      Conjunctiva/sclera: Conjunctivae normal       Pupils: Pupils are equal, round, and reactive to light  Neck:      Vascular: No JVD  Cardiovascular:      Rate and Rhythm: Normal rate and regular rhythm  Heart sounds: Normal heart sounds  No murmur heard  Pulmonary:      Effort: Pulmonary effort is normal  No respiratory distress  Breath sounds: Normal breath sounds  No wheezing  Abdominal:      General: Bowel sounds are normal  There is no distension  Palpations: Abdomen is soft  Tenderness: There is abdominal tenderness  Musculoskeletal:         General: Normal range of motion  Cervical back: Normal range of motion and neck supple  Skin:     General: Skin is warm and dry  Capillary Refill: Capillary refill takes less than 2 seconds  Neurological:      Mental Status: He is alert and oriented to person, place, and time  Cranial Nerves: No cranial nerve deficit     Psychiatric:         Behavior: Behavior normal          Vital Signs  ED Triage Vitals [05/09/22 1109]   Temperature Pulse Respirations Blood Pressure SpO2   98 7 °F (37 1 °C) 81 18 140/84 97 %      Temp Source Heart Rate Source Patient Position - Orthostatic VS BP Location FiO2 (%)   Oral Monitor Sitting Left arm --      Pain Score       --           Vitals:    05/09/22 1109 05/09/22 1230 05/09/22 1300   BP: 140/84 133/87 126/75   Pulse: 81 73 69   Patient Position - Orthostatic VS: Sitting           Visual Acuity      ED Medications  Medications   Famotidine (PF) (PEPCID) injection 20 mg (20 mg Intravenous Given 5/9/22 1147) aluminum-magnesium hydroxide-simethicone (MYLANTA) oral suspension 30 mL (30 mL Oral Given 5/9/22 1147)       Diagnostic Studies  Results Reviewed     Procedure Component Value Units Date/Time    Comprehensive metabolic panel [[id number]]  (Abnormal) Collected: 05/09/22 1145    Lab Status: Final result Specimen: Blood from Arm, Left Updated: 05/09/22 1219     Sodium 141 mmol/L      Potassium 3 8 mmol/L      Chloride 106 mmol/L      CO2 24 mmol/L      ANION GAP 11 mmol/L      BUN 22 mg/dL      Creatinine 0 96 mg/dL      Glucose 91 mg/dL      Calcium 8 9 mg/dL      AST 28 U/L      ALT 45 U/L      Alkaline Phosphatase 60 U/L      Total Protein 7 1 g/dL      Albumin 4 4 g/dL      Total Bilirubin 1 07 mg/dL      eGFR 93 ml/min/1 73sq m     Narrative:      [place] guidelines for Chronic Kidney Disease (CKD):     Stage 1 with normal or high GFR (GFR > 90 mL/min/1 73 square meters)    Stage 2 Mild CKD (GFR = 60-89 mL/min/1 73 square meters)    Stage 3A Moderate CKD (GFR = 45-59 mL/min/1 73 square meters)    Stage 3B Moderate CKD (GFR = 30-44 mL/min/1 73 square meters)    Stage 4 Severe CKD (GFR = 15-29 mL/min/1 73 square meters)    Stage 5 End Stage CKD (GFR <15 mL/min/1 73 square meters)  Note: GFR calculation is accurate only with a steady state creatinine    Lipase [[id number]]  (Normal) Collected: 05/09/22 1145    Lab Status: Final result Specimen: Blood from Arm, Left Updated: 05/09/22 1219     Lipase 120 u/L     HS Troponin 0hr (reflex protocol) [[id number]]  (Normal) Collected: 05/09/22 1145    Lab Status: Final result Specimen: Blood from Arm, Left Updated: 05/09/22 1217     hs TnI 0hr <2 ng/L     CBC and differential [[id number]] Collected: 05/09/22 1145    Lab Status: Final result Specimen: Blood from Arm, Left Updated: 05/09/22 1153     WBC 6 27 Thousand/uL      RBC 5 51 Million/uL      Hemoglobin 16 7 g/dL      Hematocrit 48 9 %      MCV 89 fL      MCH 30 3 pg      MCHC 34 2 g/dL RDW 13 2 %      MPV 11 5 fL      Platelets 757 Thousands/uL      nRBC 0 /100 WBCs      Neutrophils Relative 72 %      Immat GRANS % 0 %      Lymphocytes Relative 14 %      Monocytes Relative 11 %      Eosinophils Relative 2 %      Basophils Relative 1 %      Neutrophils Absolute 4 48 Thousands/µL      Immature Grans Absolute 0 01 Thousand/uL      Lymphocytes Absolute 0 89 Thousands/µL      Monocytes Absolute 0 69 Thousand/µL      Eosinophils Absolute 0 13 Thousand/µL      Basophils Absolute 0 07 Thousands/µL                  CT abdomen pelvis wo contrast   Final Result by Kendra Stewart MD (05/09 1156)      No acute pathology  Colonic diverticulosis without diverticulitis  Nonobstructing right renal calculus  Fatty liver  Workstation performed: [id number]                    Procedures  ECG 12 Lead Documentation Only    Date/Time: 5/9/2022 11:24 AM  Performed by: Demi Markham MD  Authorized by: Demi Markham MD     Comments:      NSR rate of 74 normal axis and intervals no acute ST elevations or depressions             ED Course                               SBIRT 20yo+      Most Recent Value   SBIRT (23 yo +)    In order to provide better care to our patients, we are screening all of our patients for alcohol and drug use  Would it be okay to ask you these screening questions? No Filed at: 05/09/2022 1243   REX: How many times in the past year have you    Used an illegal drug or used a prescription medication for non-medical reasons? Never Filed at: 05/09/2022 1243                    [facility]  Patient presents with abdominal pain worse with eating   CT shows no acute pathology, suspect component of gastritis given chronic NSAID use, will treat symptomatically and refer to GI for recheck  Disposition  Final diagnoses:   Gastritis   Abdominal pain     Time reflects when diagnosis was documented in both MDM as applicable and the Disposition within this note     Time User Action Codes Description Comment    5/9/2022 12:58 PM Shaziaalex Galeas Add [K29 70] Gastritis     5/9/2022 12:58 PM Nahomy Elviavashti Add [R10 9] Abdominal pain       ED Disposition     ED Disposition Condition Date/Time Comment    Discharge Stable Mon May 9, 2022 12:58 PM Julio C Lee discharge to home/self care              Follow-up Information     Follow up With Specialties Details Why Contact Info Additional Araceli Chacko [facility] Gastroenterology Schedule an appointment as soon as possible for a visit   [address] [id number]  [address] [facility], [address] [facility]  [address] [facility], [facility], [place], [address] [facility]    Mary Pierre MD    [address] [place] [address] [address]  [address]  [phone]             Discharge Medication List as of 5/9/2022 12:59 PM      START taking these medications    Details   famotidine (PEPCID) 20 mg tablet Take 1 tablet (20 mg total) by mouth 2 (two) times a day, Starting Mon 5/9/2022, Normal      sucralfate (CARAFATE) 1 g/10 mL suspension Take 10 mL (1 g total) by mouth 4 (four) times a day, Starting Mon 5/9/2022, Normal         CONTINUE these medications which have NOT CHANGED    Details   Adalimumab (HUMIRA PEN) 40 MG/0 8ML PNKT Inject 40 mg under the skin, Starting Wed 8/31/2016, Historical Med      aspirin 81 mg chewable tablet Chew 1 tablet (81 mg total) daily, Starting Wed 8/29/2018, Normal      Azelaic Acid (FINACEA) 15 % cream As needed, Historical Med      Dermatological Products, Misc  (ELETONE) CREA As needed, Historical Med      etanercept (ENBREL SURECLICK) 50 MG/ML injection Inject 50 mg under the skin once a week, Historical Med      gabapentin (NEURONTIN) 100 mg capsule Take 100 mg by mouth 3 (three) times a day  , Starting Wed 8/31/2016, Historical Med      hydroxychloroquine (PLAQUENIL) 200 mg tablet Take 400 mg by mouth daily at bedtime, Historical Med      leflunomide (ARAVA) 20 MG tablet Take 20 mg by mouth daily, Historical Med      methocarbamol (ROBAXIN) 500 mg tablet Take 1 tablet (500 mg total) by mouth 2 (two) times a day, Starting Tue 12/18/2018, Normal      metroNIDAZOLE (METROGEL) 1 % gel As needed, Historical Med      Multiple Vitamin (MULTIVITAMIN) tablet Take 1 tablet by mouth daily, Historical Med      !! naproxen (NAPROSYN) 500 mg tablet Take by mouth, Starting Thu 5/31/2012, Historical Med      !! naproxen (NAPROSYN) 500 mg tablet Take 1 tablet by mouth every 12 (twelve) hours as needed for mild pain or moderate pain, Starting Mon 11/13/2017, Print      Naproxen Sodium (ALEVE PO) Take by mouth 2 tabs in the am and 2 in the pm, Historical Med      pantoprazole (PROTONIX) 40 mg tablet Take 1 tablet (40 mg total) by mouth daily, Starting Wed 11/14/2018, Normal      predniSONE 20 mg tablet Take 1 tablet (20 mg total) by mouth daily Take 3 tabs for 3 days, then 2 tabs for 3 days, and 1 tab for last 3 days  , Starting Thu 12/20/2018, Print      traMADol (ULTRAM) 50 mg tablet Take 1-2 tabs p o  Q 6 hours p r n  Pain, Print       !! - Potential duplicate medications found  Please discuss with provider  No discharge procedures on file      PDMP Review     None          ED Provider  Electronically Signed by           Guicho Chaparro MD  05/09/22 0328

## 2022-05-09 NOTE — Clinical Note
Francis Gloria was seen and treated in our emergency department on 5/9/2022  No restrictions            Diagnosis:     Antonina Cruz  may return to school on return date  He may return on this date: 05/10/2022         If you have any questions or concerns, please don't hesitate to call        Kailee Alicea RN    ______________________________           _______________          _______________  Hospital Representative                              Date                                Time

## 2022-05-09 NOTE — Clinical Note
Quiana Shahriar was seen and treated in our emergency department on 5/9/2022  Diagnosis:     Lore Essex  may return to work on return date  He may return on this date: If you have any questions or concerns, please don't hesitate to call        Clint Carbone RN    ______________________________           _______________          _______________  Hospital Representative                              Date                                Time

## 2022-05-09 NOTE — DISCHARGE INSTRUCTIONS
Take pepcid and carafate as prescribed, follow up with GI doctor for recheck   Try to avoid spicy or fatty foods

## 2022-05-27 ENCOUNTER — OFFICE VISIT (OUTPATIENT)
Dept: GASTROENTEROLOGY | Facility: CLINIC | Age: 47
End: 2022-05-27
Payer: COMMERCIAL

## 2022-05-27 VITALS
SYSTOLIC BLOOD PRESSURE: 148 MMHG | HEART RATE: 102 BPM | WEIGHT: 234 LBS | DIASTOLIC BLOOD PRESSURE: 80 MMHG | HEIGHT: 72 IN | BODY MASS INDEX: 31.69 KG/M2

## 2022-05-27 DIAGNOSIS — K21.9 GASTROESOPHAGEAL REFLUX DISEASE WITHOUT ESOPHAGITIS: Primary | ICD-10-CM

## 2022-05-27 DIAGNOSIS — R10.13 EPIGASTRIC PAIN: ICD-10-CM

## 2022-05-27 DIAGNOSIS — R11.0 NAUSEA: ICD-10-CM

## 2022-05-27 PROCEDURE — 99204 OFFICE O/P NEW MOD 45 MIN: CPT | Performed by: PHYSICIAN ASSISTANT

## 2022-05-27 RX ORDER — HYDROXYCHLOROQUINE SULFATE 200 MG/1
TABLET, FILM COATED ORAL
COMMUNITY

## 2022-05-27 RX ORDER — AZELAIC ACID 0.15 G/G
GEL TOPICAL
COMMUNITY

## 2022-05-27 RX ORDER — ESCITALOPRAM OXALATE 10 MG/1
TABLET ORAL
COMMUNITY
Start: 2022-04-28

## 2022-05-27 RX ORDER — CLOTRIMAZOLE 1 %
CREAM (GRAM) TOPICAL
COMMUNITY

## 2022-05-27 RX ORDER — HALCINONIDE 1 MG/G
CREAM TOPICAL
COMMUNITY

## 2022-05-27 RX ORDER — ADALIMUMAB 40MG/0.8ML
KIT SUBCUTANEOUS
COMMUNITY

## 2022-05-27 RX ORDER — PANTOPRAZOLE SODIUM 40 MG/1
40 TABLET, DELAYED RELEASE ORAL DAILY
Qty: 30 TABLET | Refills: 1 | Status: SHIPPED | OUTPATIENT
Start: 2022-05-27 | End: 2022-06-18

## 2022-05-27 RX ORDER — ALCLOMETASONE DIPROPIONATE 0.5 MG/G
OINTMENT TOPICAL
COMMUNITY

## 2022-05-27 RX ORDER — LOPERAMIDE HYDROCHLORIDE 2 MG/1
2 TABLET ORAL 4 TIMES DAILY PRN
COMMUNITY

## 2022-05-27 RX ORDER — AZATHIOPRINE 50 MG/1
TABLET ORAL
COMMUNITY

## 2022-05-27 RX ORDER — METRONIDAZOLE 10 MG/G
GEL TOPICAL
COMMUNITY

## 2022-05-27 RX ORDER — GABAPENTIN 100 MG/1
CAPSULE ORAL
COMMUNITY
End: 2022-05-27

## 2022-05-27 NOTE — LETTER
May 31, 2022     Matti Hansen DO  [facility] [address] [facility]  [address]    Patient: Ghassan Dunlap   YOB: 1975   Date of Visit: 2022       Dear Dr Deann Mckeon: Thank you for referring Ghassan  to me for evaluation  Below are my notes for this consultation  If you have questions, please do not hesitate to call me  I look forward to following your patient along with you  Sincerely,        Marycarmen Aguilar PA-C        CC: No Recipients  Suzie Garza  2022  2:04 PM  Attested  [facility] - Outpatient Consultation  Ghassan  51 y o  male MRN: [id number]  Encounter: [id number]          ASSESSMENT AND PLAN:      1  Epigastric pain  2  Nausea    Patient reports of recurrent epigastric discomfort and nausea x 6 months  He reports a more severe episode recently that sent him to the ER on   CT Scan A/P was performed which showed no acute pathology, diverticulosis and a nonobstructing right renal calculus  He reports a long history of daily NSAIDs for his arthritis  He was given a Pepcid course for 2 weeks with insufficient benefit  Will plan for EGD to evaluate for esophagitis, gastritis, PUD, bx for h pylori, etc   Will check RUQ US to evaluate the gallbladder  Will begin Pantoprazole 40mg po daily x 8-12 weeks (pending the results of the EGD)  GERD modifications reviewed  Follow up after above testing   ______________________________________________________________________    HPI:  Patient is a pleasant 51year old male with a PMH of psoriatic arthritis who presents to the office for an evaluation of abdominal pain  Patient reports struggling with epigastric pain that can radiate to his chest after eating for the past 6 months  He also reports associated nausea  No Vomiting  He reports intermittent dysphagia for solids and pills  No melena or rectal bleeding    He reports a more severe episode recently that sent him to the ER on 5/9  CT Scan A/P was performed which showed no acute pathology, diverticulosis and a nonobstructing right renal calculus  He reports a long history of daily NSAIDs for his arthritis  He was given a Pepcid course for 2 weeks with insufficient benefit  He has never had an EGD  No family history of esophageal, stomach, or colon cancer  He had a colonoscopy last month with Dr Marychuy Christensen and a polyp was removed  REVIEW OF SYSTEMS:    CONSTITUTIONAL: Denies any fever, chills, rigors, and weight loss  HEENT: No earache or tinnitus  Denies hearing loss or visual disturbances  CARDIOVASCULAR: No palpitations  RESPIRATORY: Denies any cough, hemoptysis, shortness of breath or dyspnea on exertion  GASTROINTESTINAL: As noted in the History of Present Illness  GENITOURINARY: No problems with urination  Denies any hematuria or dysuria  NEUROLOGIC: No dizziness or vertigo, denies headaches  MUSCULOSKELETAL: Denies any muscle or joint pain  SKIN: Denies skin rashes or itching  ENDOCRINE: Denies excessive thirst  Denies intolerance to heat or cold  PSYCHOSOCIAL: Denies depression or anxiety  Denies any recent memory loss  Historical Information   Past Medical History:   Diagnosis Date    Arthritis     Chest pain     Hemorrhoids, complicated 7/25/0118    Kidney stones     Migraine     Psoriasis     Psoriatic arthritis ([address] )      Past Surgical History:   Procedure Laterality Date    HERNIA REPAIR      KIDNEY STONE SURGERY      [place] COLONOSCOPY FLX DX W/COLLJ SPEC WHEN PFRMD N/A 3/10/2017    Procedure: COLONOSCOPY;  Surgeon: Geno Walker MD;  Location: [facility];   Service: Colorectal    ULNAR NERVE REPAIR       Social History   Social History     Substance and Sexual Activity   Alcohol Use No     Social History     Substance and Sexual Activity   Drug Use No     Social History     Tobacco Use   Smoking Status Never Smoker   Smokeless Tobacco Never Used     Family History   Problem Relation Age of Onset    Hypertension Mother     Liver cancer Father        Meds/Allergies       Current Outpatient Medications:     Adalimumab (Humira Pen) 40 MG/0 8ML PNKT    alclomethasone (ACLOVATE) 0 05 % cream    aspirin 81 mg chewable tablet    azaTHIOprine (IMURAN) 50 mg tablet    Azelaic Acid 15 % cream    clotrimazole (LOTRIMIN) 1 % cream    Dermatological Products, Misc  (ELETONE) CREA    escitalopram (LEXAPRO) 10 mg tablet    famotidine (PEPCID) 20 mg tablet    gabapentin (NEURONTIN) 100 mg capsule    Halcinonide 0 1 % CREA    hydroxychloroquine (PLAQUENIL) 200 mg tablet    loperamide (IMODIUM A-D) 2 MG tablet    metroNIDAZOLE (METROGEL) 1 % gel    Multiple Vitamin (MULTIVITAMIN) tablet    Naproxen Sodium (ALEVE PO)    pantoprazole (PROTONIX) 40 mg tablet    sucralfate (CARAFATE) 1 g/10 mL suspension    Allergies   Allergen Reactions    Cetirizine Angioedema     Hives and tongue swelling and itching    Etodolac Rash     Has tolerated other NSAIDs without reaction    Leflunomide Itching, GI Intolerance and Swelling           Objective     Blood pressure 148/80, pulse 102, height 6' (1 829 m), weight 106 kg (234 lb)  Body mass index is 31 74 kg/m²  PHYSICAL EXAM:      General Appearance:   Alert, cooperative, no distress   HEENT:   Normocephalic, atraumatic, anicteric    Neck:  Supple, symmetrical, trachea midline   Lungs:   Clear to auscultation bilaterally; no rales, rhonchi or wheezing; respirations unlabored    Heart[de-identified]   Regular rate and rhythm; no murmur, rub, or gallop     Abdomen:   Soft, mild epigastric TTP, non-distended; normal bowel sounds; no masses, no organomegaly    Genitalia:   Deferred    Rectal:   Deferred    Extremities:  No cyanosis, clubbing or edema    Pulses:  2+ and symmetric    Skin:  No jaundice, rashes, or lesions    Lymph nodes:  No palpable cervical lymphadenopathy        Lab Results:   No visits with results within 1 Day(s) from this visit  Latest known visit with results is:   Admission on 05/09/2022, Discharged on 05/09/2022   Component Date Value    WBC 05/09/2022 6 27     RBC 05/09/2022 5 51     Hemoglobin 05/09/2022 16 7     Hematocrit 05/09/2022 48 9     MCV 05/09/2022 89     MCH 05/09/2022 30 3     MCHC 05/09/2022 34 2     RDW 05/09/2022 13 2     MPV 05/09/2022 11 5     Platelets 61/40/8932 289     nRBC 05/09/2022 0     Neutrophils Relative 05/09/2022 72     Immat GRANS % 05/09/2022 0     Lymphocytes Relative 05/09/2022 14     Monocytes Relative 05/09/2022 11     Eosinophils Relative 05/09/2022 2     Basophils Relative 05/09/2022 1     Neutrophils Absolute 05/09/2022 4 48     Immature Grans Absolute 05/09/2022 0 01     Lymphocytes Absolute 05/09/2022 0 89     Monocytes Absolute 05/09/2022 0 69     Eosinophils Absolute 05/09/2022 0 13     Basophils Absolute 05/09/2022 0 07     Sodium 05/09/2022 141     Potassium 05/09/2022 3 8     Chloride 05/09/2022 106     CO2 05/09/2022 24     ANION GAP 05/09/2022 11     BUN 05/09/2022 22     Creatinine 05/09/2022 0 96     Glucose 05/09/2022 91     Calcium 05/09/2022 8 9     AST 05/09/2022 28     ALT 05/09/2022 45     Alkaline Phosphatase 05/09/2022 60     Total Protein 05/09/2022 7 1     Albumin 05/09/2022 4 4     Total Bilirubin 05/09/2022 1 07 (A)    eGFR 05/09/2022 93     hs TnI 0hr 05/09/2022 <2     Lipase 05/09/2022 120     Ventricular Rate 05/09/2022 74     Atrial Rate 05/09/2022 74     NM Interval 05/09/2022 166     QRSD Interval 05/09/2022 98     QT Interval 05/09/2022 408     QTC Interval 05/09/2022 452     P Axis 05/09/2022 44     QRS Axis 05/09/2022 70     T Wave Axis 05/09/2022 20          Radiology Results:   CT abdomen pelvis wo contrast    Result Date: 5/9/2022  Narrative: CT ABDOMEN AND PELVIS WITHOUT IV CONTRAST INDICATION:   diffuse abdominal pain  COMPARISON:  None available   TECHNIQUE:  CT examination of the abdomen and pelvis was performed without intravenous contrast  (Because of a critical nationwide shortage, this study was performed without intravenous contrast ) Axial, sagittal, and coronal 2D reformatted images were created from the source data and submitted for interpretation  Radiation dose length product (DLP) for this visit:  741 mGy-cm   This examination, like all CT scans performed in the [facility], was performed utilizing techniques to minimize radiation dose exposure, including the use of iterative reconstruction and automated exposure control  Enteric contrast was not administered  FINDINGS: Absence of intravenous contrast enhancement limits evaluation of the solid abdominal viscera  ABDOMEN LOWER CHEST:  No clinically significant abnormality identified in the visualized lower chest  LIVER/BILIARY TREE:  Liver is diffusely decreased in density consistent with fatty change  No CT evidence of suspicious hepatic mass  Normal hepatic contours  No biliary dilatation  GALLBLADDER:  No calcified gallstones  No pericholecystic inflammatory change  SPLEEN:  Unremarkable  PANCREAS:  Unremarkable  ADRENAL GLANDS:  Unremarkable  KIDNEYS/URETERS:  No hydronephrosis  3 mm calculus in the interpolar right kidney  STOMACH AND BOWEL:  There is colonic diverticulosis without evidence of acute diverticulitis  APPENDIX:  Normal  ABDOMINOPELVIC CAVITY:  No ascites  No pneumoperitoneum  No lymphadenopathy  VESSELS:  Unremarkable for patient's age  PELVIS REPRODUCTIVE ORGANS:  Unremarkable for patient's age  URINARY BLADDER:  Unremarkable  ABDOMINAL WALL/INGUINAL REGIONS:  There is a small fat-containing umbilical hernia  OSSEOUS STRUCTURES:  No acute fracture or destructive osseous lesion  Impression: No acute pathology  Colonic diverticulosis without diverticulitis  Nonobstructing right renal calculus  Fatty liver   Workstation performed: [id number]   Attestation signed by Jovan Jolley MD at 5/27/2022  5:16 PM:  I supervised the Advanced Practitioner  I reviewed the Advanced Practitioner note and agree      Thai Savage MD 05/27/22

## 2022-05-27 NOTE — PROGRESS NOTES
[facility] - Outpatient Consultation  Mago Garcia 51 y o  male MRN: [id number]  Encounter: [id number]          ASSESSMENT AND PLAN:      1  Epigastric pain  2  Nausea    Patient reports of recurrent epigastric discomfort and nausea x 6 months  He reports a more severe episode recently that sent him to the ER on 5/9  CT Scan A/P was performed which showed no acute pathology, diverticulosis and a nonobstructing right renal calculus  He reports a long history of daily NSAIDs for his arthritis  He was given a Pepcid course for 2 weeks with insufficient benefit  Will plan for EGD to evaluate for esophagitis, gastritis, PUD, bx for h pylori, etc   Will check RUQ US to evaluate the gallbladder  Will begin Pantoprazole 40mg po daily x 8-12 weeks (pending the results of the EGD)  GERD modifications reviewed  Follow up after above testing   ______________________________________________________________________    HPI:  Patient is a pleasant 51year old male with a PMH of psoriatic arthritis who presents to the office for an evaluation of abdominal pain  Patient reports struggling with epigastric pain that can radiate to his chest after eating for the past 6 months  He also reports associated nausea  No Vomiting  He reports intermittent dysphagia for solids and pills  No melena or rectal bleeding  He reports a more severe episode recently that sent him to the ER on 5/9  CT Scan A/P was performed which showed no acute pathology, diverticulosis and a nonobstructing right renal calculus  He reports a long history of daily NSAIDs for his arthritis  He was given a Pepcid course for 2 weeks with insufficient benefit  He has never had an EGD  No family history of esophageal, stomach, or colon cancer  He had a colonoscopy last month with Dr Salomón Gutierrez and a polyp was removed  REVIEW OF SYSTEMS:    CONSTITUTIONAL: Denies any fever, chills, rigors, and weight loss    HEENT: No earache or tinnitus  Denies hearing loss or visual disturbances  CARDIOVASCULAR: No palpitations  RESPIRATORY: Denies any cough, hemoptysis, shortness of breath or dyspnea on exertion  GASTROINTESTINAL: As noted in the History of Present Illness  GENITOURINARY: No problems with urination  Denies any hematuria or dysuria  NEUROLOGIC: No dizziness or vertigo, denies headaches  MUSCULOSKELETAL: Denies any muscle or joint pain  SKIN: Denies skin rashes or itching  ENDOCRINE: Denies excessive thirst  Denies intolerance to heat or cold  PSYCHOSOCIAL: Denies depression or anxiety  Denies any recent memory loss  Historical Information   Past Medical History:   Diagnosis Date    Arthritis     Chest pain     Hemorrhoids, complicated 1/00/2419    Kidney stones     Migraine     Psoriasis     Psoriatic arthritis ([facility] [address] )      Past Surgical History:   Procedure Laterality Date    HERNIA REPAIR      KIDNEY STONE SURGERY      [place] COLONOSCOPY FLX DX W/COLLJ SPEC WHEN PFRMD N/A 3/10/2017    Procedure: COLONOSCOPY;  Surgeon: Eileen Wu MD;  Location: [facility];   Service: Colorectal    ULNAR NERVE REPAIR       Social History   Social History     Substance and Sexual Activity   Alcohol Use No     Social History     Substance and Sexual Activity   Drug Use No     Social History     Tobacco Use   Smoking Status Never Smoker   Smokeless Tobacco Never Used     Family History   Problem Relation Age of Onset    Hypertension Mother     Liver cancer Father        Meds/Allergies       Current Outpatient Medications:     Adalimumab (Humira Pen) 40 MG/0 8ML PNKT    alclomethasone (ACLOVATE) 0 05 % cream    aspirin 81 mg chewable tablet    azaTHIOprine (IMURAN) 50 mg tablet    Azelaic Acid 15 % cream    clotrimazole (LOTRIMIN) 1 % cream    Dermatological Products, Misc  (ELETONE) CREA    escitalopram (LEXAPRO) 10 mg tablet    famotidine (PEPCID) 20 mg tablet    gabapentin (NEURONTIN) 100 mg capsule    Halcinonide 0 1 % CREA    hydroxychloroquine (PLAQUENIL) 200 mg tablet    loperamide (IMODIUM A-D) 2 MG tablet    metroNIDAZOLE (METROGEL) 1 % gel    Multiple Vitamin (MULTIVITAMIN) tablet    Naproxen Sodium (ALEVE PO)    pantoprazole (PROTONIX) 40 mg tablet    sucralfate (CARAFATE) 1 g/10 mL suspension    Allergies   Allergen Reactions    Cetirizine Angioedema     Hives and tongue swelling and itching    Etodolac Rash     Has tolerated other NSAIDs without reaction    Leflunomide Itching, GI Intolerance and Swelling           Objective     Blood pressure 148/80, pulse 102, height 6' (1 829 m), weight 106 kg (234 lb)  Body mass index is 31 74 kg/m²  PHYSICAL EXAM:      General Appearance:   Alert, cooperative, no distress   HEENT:   Normocephalic, atraumatic, anicteric    Neck:  Supple, symmetrical, trachea midline   Lungs:   Clear to auscultation bilaterally; no rales, rhonchi or wheezing; respirations unlabored    Heart[de-identified]   Regular rate and rhythm; no murmur, rub, or gallop  Abdomen:   Soft, mild epigastric TTP, non-distended; normal bowel sounds; no masses, no organomegaly    Genitalia:   Deferred    Rectal:   Deferred    Extremities:  No cyanosis, clubbing or edema    Pulses:  2+ and symmetric    Skin:  No jaundice, rashes, or lesions    Lymph nodes:  No palpable cervical lymphadenopathy        Lab Results:   No visits with results within 1 Day(s) from this visit     Latest known visit with results is:   Admission on 05/09/2022, Discharged on 05/09/2022   Component Date Value    WBC 05/09/2022 6 27     RBC 05/09/2022 5 51     Hemoglobin 05/09/2022 16 7     Hematocrit 05/09/2022 48 9     MCV 05/09/2022 89     MCH 05/09/2022 30 3     MCHC 05/09/2022 34 2     RDW 05/09/2022 13 2     MPV 05/09/2022 11 5     Platelets 54/85/6225 289     nRBC 05/09/2022 0     Neutrophils Relative 05/09/2022 72     Immat GRANS % 05/09/2022 0     Lymphocytes Relative 05/09/2022 14     Monocytes Relative 05/09/2022 11     Eosinophils Relative 05/09/2022 2     Basophils Relative 05/09/2022 1     Neutrophils Absolute 05/09/2022 4 48     Immature Grans Absolute 05/09/2022 0 01     Lymphocytes Absolute 05/09/2022 0 89     Monocytes Absolute 05/09/2022 0 69     Eosinophils Absolute 05/09/2022 0 13     Basophils Absolute 05/09/2022 0 07     Sodium 05/09/2022 141     Potassium 05/09/2022 3 8     Chloride 05/09/2022 106     CO2 05/09/2022 24     ANION GAP 05/09/2022 11     BUN 05/09/2022 22     Creatinine 05/09/2022 0 96     Glucose 05/09/2022 91     Calcium 05/09/2022 8 9     AST 05/09/2022 28     ALT 05/09/2022 45     Alkaline Phosphatase 05/09/2022 60     Total Protein 05/09/2022 7 1     Albumin 05/09/2022 4 4     Total Bilirubin 05/09/2022 1 07 (A)    eGFR 05/09/2022 93     hs TnI 0hr 05/09/2022 <2     Lipase 05/09/2022 120     Ventricular Rate 05/09/2022 74     Atrial Rate 05/09/2022 74     [place] Interval 05/09/2022 166     QRSD Interval 05/09/2022 98     QT Interval 05/09/2022 408     QTC Interval 05/09/2022 452     P Axis 05/09/2022 44     QRS Axis 05/09/2022 70     T Wave Axis 05/09/2022 20          Radiology Results:   CT abdomen pelvis wo contrast    Result Date: 5/9/2022  Narrative: CT ABDOMEN AND PELVIS WITHOUT IV CONTRAST INDICATION:   diffuse abdominal pain  COMPARISON:  None available  TECHNIQUE:  CT examination of the abdomen and pelvis was performed without intravenous contrast  (Because of a critical nationwide shortage, this study was performed without intravenous contrast ) Axial, sagittal, and coronal 2D reformatted images were created from the source data and submitted for interpretation  Radiation dose length product (DLP) for this visit:  741 mGy-cm     This examination, like all CT scans performed in the [facility], was performed utilizing techniques to minimize radiation dose exposure, including the use of iterative reconstruction and automated exposure control  Enteric contrast was not administered  FINDINGS: Absence of intravenous contrast enhancement limits evaluation of the solid abdominal viscera  ABDOMEN LOWER CHEST:  No clinically significant abnormality identified in the visualized lower chest  LIVER/BILIARY TREE:  Liver is diffusely decreased in density consistent with fatty change  No CT evidence of suspicious hepatic mass  Normal hepatic contours  No biliary dilatation  GALLBLADDER:  No calcified gallstones  No pericholecystic inflammatory change  SPLEEN:  Unremarkable  PANCREAS:  Unremarkable  ADRENAL GLANDS:  Unremarkable  KIDNEYS/URETERS:  No hydronephrosis  3 mm calculus in the interpolar right kidney  STOMACH AND BOWEL:  There is colonic diverticulosis without evidence of acute diverticulitis  APPENDIX:  Normal  ABDOMINOPELVIC CAVITY:  No ascites  No pneumoperitoneum  No lymphadenopathy  VESSELS:  Unremarkable for patient's age  PELVIS REPRODUCTIVE ORGANS:  Unremarkable for patient's age  URINARY BLADDER:  Unremarkable  ABDOMINAL WALL/INGUINAL REGIONS:  There is a small fat-containing umbilical hernia  OSSEOUS STRUCTURES:  No acute fracture or destructive osseous lesion  Impression: No acute pathology  Colonic diverticulosis without diverticulitis  Nonobstructing right renal calculus  Fatty liver   Workstation performed: [id number]

## 2022-06-06 ENCOUNTER — HOSPITAL ENCOUNTER (OUTPATIENT)
Dept: ULTRASOUND IMAGING | Facility: CLINIC | Age: 47
Discharge: HOME | End: 2022-06-06
Payer: COMMERCIAL

## 2022-06-06 DIAGNOSIS — K21.9 GASTROESOPHAGEAL REFLUX DISEASE WITHOUT ESOPHAGITIS: ICD-10-CM

## 2022-06-06 PROCEDURE — 76705 ECHO EXAM OF ABDOMEN: CPT

## 2022-06-10 ENCOUNTER — TELEPHONE (OUTPATIENT)
Dept: GASTROENTEROLOGY | Facility: CLINIC | Age: 47
End: 2022-06-10

## 2022-06-10 NOTE — TELEPHONE ENCOUNTER
----- Message from Alexander Marquez PA-C sent at 6/9/2022  1:12 PM EDT -----  Please inform patient that the ultrasound showed that his gallbladder was normal   It did show a fatty liver (recommend diet and exercise for treatment of his fatty liver) and it showed a nonobstructing kidney stone (which was also seen on his prior CT Scan)

## 2022-06-18 DIAGNOSIS — K21.9 GASTROESOPHAGEAL REFLUX DISEASE WITHOUT ESOPHAGITIS: ICD-10-CM

## 2022-06-18 RX ORDER — PANTOPRAZOLE SODIUM 40 MG/1
TABLET, DELAYED RELEASE ORAL
Qty: 90 TABLET | Refills: 1 | Status: SHIPPED | OUTPATIENT
Start: 2022-06-18

## 2022-07-13 ENCOUNTER — ANESTHESIA (OUTPATIENT)
Dept: GASTROENTEROLOGY | Facility: HOSPITAL | Age: 47
End: 2022-07-13

## 2022-07-13 ENCOUNTER — ANESTHESIA EVENT (OUTPATIENT)
Dept: GASTROENTEROLOGY | Facility: HOSPITAL | Age: 47
End: 2022-07-13

## 2022-07-13 ENCOUNTER — HOSPITAL ENCOUNTER (OUTPATIENT)
Dept: GASTROENTEROLOGY | Facility: HOSPITAL | Age: 47
Setting detail: OUTPATIENT SURGERY
Discharge: HOME | End: 2022-07-13
Admitting: INTERNAL MEDICINE
Payer: COMMERCIAL

## 2022-07-13 VITALS
RESPIRATION RATE: 15 BRPM | HEIGHT: 72 IN | SYSTOLIC BLOOD PRESSURE: 123 MMHG | WEIGHT: 233.47 LBS | HEART RATE: 66 BPM | BODY MASS INDEX: 31.62 KG/M2 | DIASTOLIC BLOOD PRESSURE: 79 MMHG | OXYGEN SATURATION: 96 % | TEMPERATURE: 97.6 F

## 2022-07-13 DIAGNOSIS — R10.13 EPIGASTRIC PAIN: ICD-10-CM

## 2022-07-13 DIAGNOSIS — R11.0 NAUSEA: ICD-10-CM

## 2022-07-13 PROCEDURE — 88305 TISSUE EXAM BY PATHOLOGIST: CPT | Performed by: PATHOLOGY

## 2022-07-13 PROCEDURE — 43239 EGD BIOPSY SINGLE/MULTIPLE: CPT | Performed by: INTERNAL MEDICINE

## 2022-07-13 RX ORDER — LIDOCAINE HYDROCHLORIDE 20 MG/ML
INJECTION, SOLUTION EPIDURAL; INFILTRATION; INTRACAUDAL; PERINEURAL AS NEEDED
Status: DISCONTINUED | OUTPATIENT
Start: 2022-07-13 | End: 2022-07-13

## 2022-07-13 RX ORDER — SODIUM CHLORIDE, SODIUM LACTATE, POTASSIUM CHLORIDE, CALCIUM CHLORIDE 600; 310; 30; 20 MG/100ML; MG/100ML; MG/100ML; MG/100ML
125 INJECTION, SOLUTION INTRAVENOUS CONTINUOUS
Status: CANCELLED | OUTPATIENT
Start: 2022-07-13

## 2022-07-13 RX ORDER — SODIUM CHLORIDE, SODIUM LACTATE, POTASSIUM CHLORIDE, CALCIUM CHLORIDE 600; 310; 30; 20 MG/100ML; MG/100ML; MG/100ML; MG/100ML
125 INJECTION, SOLUTION INTRAVENOUS CONTINUOUS
Status: DISCONTINUED | OUTPATIENT
Start: 2022-07-13 | End: 2022-07-17 | Stop reason: HOSPADM

## 2022-07-13 RX ORDER — PROPOFOL 10 MG/ML
INJECTION, EMULSION INTRAVENOUS AS NEEDED
Status: DISCONTINUED | OUTPATIENT
Start: 2022-07-13 | End: 2022-07-13

## 2022-07-13 RX ADMIN — PROPOFOL 20 MG: 10 INJECTION, EMULSION INTRAVENOUS at 10:57

## 2022-07-13 RX ADMIN — PROPOFOL 100 MG: 10 INJECTION, EMULSION INTRAVENOUS at 10:53

## 2022-07-13 RX ADMIN — LIDOCAINE HYDROCHLORIDE 100 MG: 20 INJECTION, SOLUTION EPIDURAL; INFILTRATION; INTRACAUDAL; PERINEURAL at 10:52

## 2022-07-13 RX ADMIN — PROPOFOL 100 MG: 10 INJECTION, EMULSION INTRAVENOUS at 10:54

## 2022-07-13 RX ADMIN — SODIUM CHLORIDE, SODIUM LACTATE, POTASSIUM CHLORIDE, AND CALCIUM CHLORIDE 125 ML/HR: .6; .31; .03; .02 INJECTION, SOLUTION INTRAVENOUS at 09:42

## 2022-07-13 NOTE — H&P
History and Physical - [facility]  Ángel Schmidt 51 y o  male MRN: [id number]                  HPI: Ángel Schmidt is a 51y o  year old male who presents for EGD for epigastric pain and nausea      REVIEW OF SYSTEMS: Per the HPI, and otherwise unremarkable  Historical Information   Past Medical History:   Diagnosis Date    Arthritis     Chest pain     Colon polyp     GERD (gastroesophageal reflux disease)     Hemorrhoids, complicated 66/97/6724    Kidney stone     Kidney stones     Migraine     Psoriasis     Psoriatic arthritis (HCC)      Past Surgical History:   Procedure Laterality Date    COLONOSCOPY      CYSTOSCOPY      HERNIA REPAIR      KIDNEY STONE SURGERY      [place] COLONOSCOPY FLX DX W/COLLJ SPEC WHEN PFRMD N/A 03/10/2017    Procedure: COLONOSCOPY;  Surgeon: Evan Colbert MD;  Location: [facility]; Service: Colorectal    ULNAR NERVE REPAIR       Social History   Social History     Substance and Sexual Activity   Alcohol Use No     Social History     Substance and Sexual Activity   Drug Use No     Social History     Tobacco Use   Smoking Status Never Smoker   Smokeless Tobacco Never Used     Family History   Problem Relation Age of Onset    Hypertension Mother     Liver cancer Father        Meds/Allergies     (Not in a hospital admission)      Allergies   Allergen Reactions    Cetirizine Angioedema     Hives and tongue swelling and itching    Etodolac Rash     Has tolerated other NSAIDs without reaction    Leflunomide Itching, GI Intolerance and Swelling       Objective     Blood pressure 134/90, pulse 73, temperature (!) 97 1 °F (36 2 °C), temperature source Temporal, resp  rate 20, height 6' (1 829 m), weight 106 kg (233 lb 7 5 oz), SpO2 99 %        PHYSICAL EXAM    Gen: NAD  CV: RRR  CHEST: Clear  ABD: soft, NT/ND  EXT: no edema  Neuro: AAO      ASSESSMENT/PLAN:  This is a 51y o  year old male here for epigastric pain, nausea    PLAN: Procedure:  EGD

## 2022-07-13 NOTE — ANESTHESIA PREPROCEDURE EVALUATION
Procedure:  EGD    ASSESSMENT AND PLAN:       1  Epigastric pain  2  Nausea     Patient reports of recurrent epigastric discomfort and nausea x 6 months  He reports a more severe episode recently that sent him to the ER on 5/9  CT Scan A/P was performed which showed no acute pathology, diverticulosis and a nonobstructing right renal calculus  He reports a long history of daily NSAIDs for his arthritis  He was given a Pepcid course for 2 weeks with insufficient benefit      Will plan for EGD to evaluate for esophagitis, gastritis, PUD, bx for h pylori, etc   Will check RUQ US to evaluate the gallbladder  Will begin Pantoprazole 40mg po daily x 8-12 weeks (pending the results of the EGD)  GERD modifications reviewed  Follow up after above testing   ______________________________________________________________________     HPI:  Patient is a pleasant 51year old male with a PMH of psoriatic arthritis who presents to the office for an evaluation of abdominal pain  Patient reports struggling with epigastric pain that can radiate to his chest after eating for the past 6 months  He also reports associated nausea  No Vomiting  He reports intermittent dysphagia for solids and pills  No melena or rectal bleeding  He reports a more severe episode recently that sent him to the ER on 5/9  CT Scan A/P was performed which showed no acute pathology, diverticulosis and a nonobstructing right renal calculus  He reports a long history of daily NSAIDs for his arthritis  He was given a Pepcid course for 2 weeks with insufficient benefit  He has never had an EGD  No family history of esophageal, stomach, or colon cancer    He had a colonoscopy last month with Dr Angela Gallagher and a polyp was removed          Relevant Problems   CARDIO   (+) Chest pain   (+) Essential hypertension        Physical Exam    Airway    Mallampati score: III  TM Distance: >3 FB  Neck ROM: full     Dental       Cardiovascular  Cardiovascular exam normal    Pulmonary  Pulmonary exam normal     Other Findings        Anesthesia Plan  ASA Score- 2     Anesthesia Type- IV sedation with anesthesia with ASA Monitors  Additional Monitors:   Airway Plan:           Plan Factors-Exercise tolerance (METS): >4 METS  Chart reviewed  Existing labs reviewed  Patient summary reviewed  Patient is not a current smoker  Induction- intravenous  Postoperative Plan-     Informed Consent- Anesthetic plan and risks discussed with patient  I personally reviewed this patient with the CRNA  Discussed and agreed on the Anesthesia Plan with the CRNA  Corine Escamilla            History Comments History Comments   Kidney stones  Arthritis    Migraine  Hemorrhoids, complicated    Chest pain  Psoriasis    Psoriatic arthritis ([address] )  GERD (gastroesophageal reflux disease)    Kidney stone  Colon polyp        Surgical History     Current as of 07/13/22 0938  HERNIA REPAIR ULNAR NERVE REPAIR   KIDNEY STONE SURGERY PA COLONOSCOPY FLX DX W/COLLJ SPEC WHEN PFRMD   COLONOSCOPY CYSTOSCOPY     Substance History     Current as of 07/13/22 0938  Smoking Status: Never Smoker   Smokeless Tobacco Status: Never Used   Alcohol use: No   Drug use: No     Problem List     Current as of 07/13/22 0938  Hemorrhoids, complicated   Chest pain   Essential hypertension   Psoriasis

## 2022-07-13 NOTE — ANESTHESIA POSTPROCEDURE EVALUATION
Post-Op Assessment Note    CV Status:  Stable  Pain Score: 0    Pain management: adequate     Mental Status:  Alert and awake   Hydration Status:  Euvolemic   PONV Controlled:  Controlled   Airway Patency:  Patent      Post Op Vitals Reviewed: Yes      Staff: CRNA         No complications documented      /76 (07/13/22 1103)    Temp 97 6 °F (36 4 °C) (07/13/22 1103)    Pulse 72 (07/13/22 1103)   Resp 18 (07/13/22 1103)    SpO2 95 % (07/13/22 1103)

## 2022-08-04 ENCOUNTER — TELEPHONE (OUTPATIENT)
Dept: GASTROENTEROLOGY | Facility: CLINIC | Age: 47
End: 2022-08-04

## 2022-08-04 NOTE — TELEPHONE ENCOUNTER
Patients GI provider:  Dr Danial Alfredo    Number to return call: [phone]    Reason for call: Pt called and requested a call back to go over colonoscopy results please reach out to patient      Scheduled procedure/appointment date if applicable: n/a

## 2022-08-04 NOTE — TELEPHONE ENCOUNTER
Please tell the patient the biopsies (by the way this was not a colonoscopy but an egd - dr Merrell Kayser did his colon) were completely benign and if he is still having symptoms to please follow up with Octavio Velasco

## 2022-08-11 ENCOUNTER — TELEPHONE (OUTPATIENT)
Dept: GASTROENTEROLOGY | Facility: CLINIC | Age: 47
End: 2022-08-11

## 2022-08-11 ENCOUNTER — OFFICE VISIT (OUTPATIENT)
Dept: GASTROENTEROLOGY | Facility: CLINIC | Age: 47
End: 2022-08-11
Payer: COMMERCIAL

## 2022-08-11 VITALS
DIASTOLIC BLOOD PRESSURE: 100 MMHG | HEART RATE: 87 BPM | OXYGEN SATURATION: 98 % | HEIGHT: 72 IN | BODY MASS INDEX: 32.29 KG/M2 | WEIGHT: 238.4 LBS | SYSTOLIC BLOOD PRESSURE: 140 MMHG

## 2022-08-11 DIAGNOSIS — R10.13 EPIGASTRIC PAIN: Primary | ICD-10-CM

## 2022-08-11 PROCEDURE — 99214 OFFICE O/P EST MOD 30 MIN: CPT | Performed by: PHYSICIAN ASSISTANT

## 2022-08-11 RX ORDER — PANTOPRAZOLE SODIUM 40 MG/1
40 TABLET, DELAYED RELEASE ORAL 2 TIMES DAILY
Qty: 60 TABLET | Refills: 1 | Status: SHIPPED | OUTPATIENT
Start: 2022-08-11 | End: 2022-09-06

## 2022-08-11 NOTE — PROGRESS NOTES
[facility] - Outpatient Follow-up Note  Steve Phillips 49 y o  male MRN: [id number]  Encounter: [id number]          ASSESSMENT AND PLAN:      1  Epigastric pain    Patient reports continued postprandial epigastric and chest discomfort despite the Pantoprazole 40mg po daily  EGD 7/13 showed mild erythema in the antrum and prepyloric region; biopsies were benign and negative for h pylori  US 6/6 showed no gallstones; fatty liver  He had a prior CT Scan A/P in May but this was unenhanced/no IV contrast limiting the evaluation  Will increase Pantoprazole to 40mg po BID x 4-6 weeks for GERD  GERD modifications reviewed  Will check a CT Scan A/P with IV contrast and a gastric emptying study to further evaluate  If above work up negative and no relief on the PPI BID, consider a PIPIDA scan and esophageal manometry  2  Fatty liver    Ultrasound showed moderate steatosis  LFTs have been normal in the past   Testing for Hep B and C negative in the past   He does not drink ETOH  BMI is 32 33  Discussed the diagnosis of a fatty liver and risk of progression to cirrhosis  Will recheck hepatic panel and BUTTERFIELD fibrosure  Will check for immunity to Hep A and B and recommend vaccinations if not  Discussed importance of weight loss with diet and exercise for treatment of his fatty liver  Follow up in 4-6 weeks  ______________________________________________________________________    SUBJECTIVE:  Patient is a pleasant 49year old male who presents to the office for follow up  He reports he continues to experience epigastric and chest discomfort after eating despite taking the Pantoprazole daily  He reports his nausea has improved  He has occasional dysphagia for pills  No vomiting  He reports the discomfort can last for an hour or more  He reports some early satiety  He does not drink alcohol  REVIEW OF SYSTEMS IS OTHERWISE NEGATIVE        Historical Information Past Medical History:   Diagnosis Date    Arthritis     Chest pain     Colon polyp     GERD (gastroesophageal reflux disease)     Hemorrhoids, complicated 74/52/7771    Kidney stone     Kidney stones     Migraine     Psoriasis     Psoriatic arthritis (HCC)      Past Surgical History:   Procedure Laterality Date    COLONOSCOPY      CYSTOSCOPY      HERNIA REPAIR      KIDNEY STONE SURGERY      [place] COLONOSCOPY FLX DX W/COLLJ SPEC WHEN PFRMD N/A 03/10/2017    Procedure: COLONOSCOPY;  Surgeon: Kassie Ramirez MD;  Location: [facility];   Service: Colorectal    ULNAR NERVE REPAIR       Social History   Social History     Substance and Sexual Activity   Alcohol Use No     Social History     Substance and Sexual Activity   Drug Use No     Social History     Tobacco Use   Smoking Status Never Smoker   Smokeless Tobacco Never Used     Family History   Problem Relation Age of Onset    Hypertension Mother     Liver cancer Father        Meds/Allergies       Current Outpatient Medications:     Adalimumab (Humira Pen) 40 MG/0 8ML PNKT    alclomethasone (ACLOVATE) 0 05 % cream    aspirin 81 mg chewable tablet    azaTHIOprine (IMURAN) 50 mg tablet    Azelaic Acid 15 % cream    clotrimazole (LOTRIMIN) 1 % cream    Dermatological Products, Misc  (ELETONE) CREA    escitalopram (LEXAPRO) 10 mg tablet    famotidine (PEPCID) 20 mg tablet    gabapentin (NEURONTIN) 100 mg capsule    Halcinonide 0 1 % CREA    hydroxychloroquine (PLAQUENIL) 200 mg tablet    loperamide (IMODIUM A-D) 2 MG tablet    metroNIDAZOLE (METROGEL) 1 % gel    Multiple Vitamin (MULTIVITAMIN) tablet    Naproxen Sodium (ALEVE PO)    pantoprazole (PROTONIX) 40 mg tablet    pantoprazole (PROTONIX) 40 mg tablet    sucralfate (CARAFATE) 1 g/10 mL suspension    Allergies   Allergen Reactions    Cetirizine Angioedema     Hives and tongue swelling and itching    Zyvexol [Anti-Oxidant] Anaphylaxis and Rash    Etodolac Rash     Has tolerated other NSAIDs without reaction    Leflunomide Itching, GI Intolerance and Swelling           Objective     Blood pressure 140/100, pulse 87, height 6' (1 829 m), weight 108 kg (238 lb 6 4 oz), SpO2 98 %  Body mass index is 32 33 kg/m²  PHYSICAL EXAM:      General Appearance:   Alert, cooperative, no distress   HEENT:   Normocephalic, atraumatic, anicteric    Neck:  Supple, symmetrical, trachea midline   Lungs:   Clear to auscultation bilaterally; no rales, rhonchi or wheezing; respirations unlabored    Heart[de-identified]   Regular rate and rhythm; no murmur, rub, or gallop  Abdomen:   Soft, non-tender, non-distended; normal bowel sounds; no masses, no organomegaly    Genitalia:   Deferred    Rectal:   Deferred    Extremities:  No cyanosis, clubbing or edema    Pulses:  2+ and symmetric    Skin:  No jaundice, rashes, or lesions    Lymph nodes:  No palpable cervical lymphadenopathy        Lab Results:   No visits with results within 1 Day(s) from this visit     Latest known visit with results is:   Hospital Outpatient Visit on 07/13/2022   Component Date Value    Case Report 07/13/2022                      Value:Surgical Pathology Report                         Case: [id number]                                   Authorizing Provider:  Jeffery Fragoso MD      Collected:           07/13/2022 1057              Ordering Location:      [facility]       Received:            07/13/2022 [address] [place] [address] Endoscopy                                                             Pathologist:           Rosa Fitzpatrick DO                                                             Specimens:   A) - Stomach, antrum                                                                                B) - Stomach, body                                                                                  C) - Stomach, fundus                                                                       Final Diagnosis 07/13/2022                      Value: This result contains rich text formatting which cannot be displayed here   Additional Information 07/13/2022                      Value: This result contains rich text formatting which cannot be displayed here  [place] [facility] Description 07/13/2022                      Value: This result contains rich text formatting which cannot be displayed here   Clinical Information 07/13/2022                      Value:Cold bx r/o H pylori         Radiology Results:   EGD    Result Date: 7/13/2022  Narrative:  [facility] [phone] DATE OF SERVICE: 7/13/22 PHYSICIAN(S): Attending: Flor Feliciano MD Fellow: No Staff Documented INDICATION: Nausea, Epigastric pain POST-OP DIAGNOSIS: See the impression below  PREPROCEDURE: Informed consent was obtained for the procedure, including sedation  Risks of perforation, hemorrhage, adverse drug reaction and aspiration were discussed  The patient was placed in the left lateral decubitus position  Patient was explained about the risks and benefits of the procedure  Risks including but not limited to bleeding, infection, and perforation were explained in detail  Also explained about less than 100% sensitivity with the exam and other alternatives  DETAILS OF PROCEDURE: Patient was taken to the procedure room where a time out was performed to confirm correct patient and correct procedure  The patient underwent monitored anesthesia care, which was administered by an anesthesia professional  The patient's blood pressure, heart rate, level of consciousness, respirations and oxygen were monitored throughout the procedure  The scope was advanced to the third part of the duodenum  Retroflexion was performed in the cardia, fundus and incisura  The patient's estimated blood loss was minimal (<5 mL)  The procedure was not difficult  The patient tolerated the procedure well   There were no apparent complications  ANESTHESIA INFORMATION: ASA: II Anesthesia Type: IV Sedation with Anesthesia MEDICATIONS: No administrations occurring from 1046 to 1058 on 07/13/22 FINDINGS: The upper third of the esophagus, middle third of the esophagus, lower third of the esophagus, GE junction, Z-line, cardia, fundus of the stomach, body of the stomach, incisura, pylorus, duodenal bulb, 2nd part of the duodenum and 3rd part of the duodenum appeared normal  Z-line is 42 cm from the incisors  Mild, localized erythematous mucosa in the antrum and prepyloric region with no bleeding; performed cold forceps biopsy Performed forceps biopsies in the fundus of the stomach and body of the stomach SPECIMENS: ID Type Source Tests Collected by Time Destination 1 : antrum Tissue Stomach TISSUE EXAM Geeta White MD 7/13/2022 10:57 AM  2 : body Tissue Stomach TISSUE EXAM Geeta White MD 7/13/2022 10:57 AM  3 : fundus Tissue Stomach TISSUE EXAM Geeta White MD 7/13/2022 10:58 AM      Impression: Minimal antral and pre-pyloric scattered erythema, status post biopsy    Otherwise normal EGD, status post biopsy of the stomach body and fundus RECOMMENDATION:  follow-up biopsy results in 3 weeks Continue current medical management   Geeta White MD

## 2022-08-20 ENCOUNTER — APPOINTMENT (OUTPATIENT)
Dept: LAB | Facility: HOSPITAL | Age: 47
End: 2022-08-20
Payer: COMMERCIAL

## 2022-08-20 DIAGNOSIS — R10.13 EPIGASTRIC PAIN: ICD-10-CM

## 2022-08-20 LAB
ALBUMIN SERPL BCP-MCNC: 4.3 G/DL (ref 3.5–5)
ALP SERPL-CCNC: 58 U/L (ref 46–116)
ALT SERPL W P-5'-P-CCNC: 42 U/L (ref 12–78)
ANION GAP SERPL CALCULATED.3IONS-SCNC: 11 MMOL/L (ref 4–13)
AST SERPL W P-5'-P-CCNC: 29 U/L (ref 5–45)
BILIRUB SERPL-MCNC: 0.66 MG/DL (ref 0.2–1)
BUN SERPL-MCNC: 17 MG/DL (ref 5–25)
CALCIUM SERPL-MCNC: 9.2 MG/DL (ref 8.3–10.1)
CHLORIDE SERPL-SCNC: 105 MMOL/L (ref 96–108)
CO2 SERPL-SCNC: 26 MMOL/L (ref 21–32)
CREAT SERPL-MCNC: 0.95 MG/DL (ref 0.6–1.3)
GFR SERPL CREATININE-BSD FRML MDRD: 94 ML/MIN/1.73SQ M
GLUCOSE P FAST SERPL-MCNC: 91 MG/DL (ref 65–99)
HAV AB SER QL IA: NORMAL
HBV SURFACE AB SER-ACNC: <3.1 MIU/ML
POTASSIUM SERPL-SCNC: 3.8 MMOL/L (ref 3.5–5.3)
PROT SERPL-MCNC: 7.3 G/DL (ref 6.4–8.4)
SODIUM SERPL-SCNC: 142 MMOL/L (ref 135–147)

## 2022-08-20 PROCEDURE — 82977 ASSAY OF GGT: CPT

## 2022-08-20 PROCEDURE — 82947 ASSAY GLUCOSE BLOOD QUANT: CPT

## 2022-08-20 PROCEDURE — 82465 ASSAY BLD/SERUM CHOLESTEROL: CPT

## 2022-08-20 PROCEDURE — 83010 ASSAY OF HAPTOGLOBIN QUANT: CPT

## 2022-08-20 PROCEDURE — 86706 HEP B SURFACE ANTIBODY: CPT

## 2022-08-20 PROCEDURE — 36415 COLL VENOUS BLD VENIPUNCTURE: CPT

## 2022-08-20 PROCEDURE — 82247 BILIRUBIN TOTAL: CPT

## 2022-08-20 PROCEDURE — 82172 ASSAY OF APOLIPOPROTEIN: CPT

## 2022-08-20 PROCEDURE — 83883 ASSAY NEPHELOMETRY NOT SPEC: CPT

## 2022-08-20 PROCEDURE — 86708 HEPATITIS A ANTIBODY: CPT

## 2022-08-20 PROCEDURE — 80053 COMPREHEN METABOLIC PANEL: CPT

## 2022-08-20 PROCEDURE — 84478 ASSAY OF TRIGLYCERIDES: CPT

## 2022-08-20 PROCEDURE — 84460 ALANINE AMINO (ALT) (SGPT): CPT

## 2022-08-20 PROCEDURE — 84450 TRANSFERASE (AST) (SGOT): CPT

## 2022-08-25 ENCOUNTER — TELEPHONE (OUTPATIENT)
Dept: GASTROENTEROLOGY | Facility: CLINIC | Age: 47
End: 2022-08-25

## 2022-08-25 LAB
A2 MACROGLOB SERPL-MCNC: 103 MG/DL (ref 110–276)
ALT SERPL W P-5'-P-CCNC: 47 IU/L (ref 0–55)
APO A-I SERPL-MCNC: 136 MG/DL (ref 101–178)
AST SERPL W P-5'-P-CCNC: 29 IU/L (ref 0–40)
BILIRUB SERPL-MCNC: 0.5 MG/DL (ref 0–1.2)
CHOLEST SERPL-MCNC: 207 MG/DL (ref 100–199)
FIBROSIS SCORING:: ABNORMAL
FIBROSIS STAGE SERPL QL: ABNORMAL
GGT SERPL-CCNC: 31 IU/L (ref 0–65)
GLUCOSE SERPL-MCNC: 81 MG/DL (ref 65–99)
HAPTOGLOB SERPL-MCNC: 74 MG/DL (ref 23–355)
LABORATORY COMMENT REPORT: ABNORMAL
LIVER FIBR SCORE SERPL CALC.FIBROSURE: 0.11 (ref 0–0.21)
NECROINFLAMMATORY ACT GRADE SERPL QL: ABNORMAL
NECROINFLAMMATORY ACT SCORE SERPL: 0.5
SERVICE CMNT-IMP: ABNORMAL
SL AMB INTERPRETATION: ABNORMAL
SL AMB NASH SCORING: ABNORMAL
SL AMB STEATOSIS GRADE: ABNORMAL
SL AMB STEATOSIS SCORE: 0.69 (ref 0–0.3)
STEATOSIS GRADING: ABNORMAL
TRIGL SERPL-MCNC: 225 MG/DL (ref 0–149)

## 2022-08-25 NOTE — TELEPHONE ENCOUNTER
----- Message from Luis Valencia PA-C sent at 8/25/2022  1:56 PM EDT -----  Please inform patient that the BUTTERFIELD fibrosure did not show any significant fibrosis (scar tissue) at this time in the liver (no evidence of cirrhosis) which is good but he does have evidence of severe steatosis (fat in the liver)  Recommend weight loss with diet and exercise for treatment of his fatty liver  Also, I checked for immunity to Hepatitis A and Hepatitis B which were negative - recommend he be vaccinated for Hepatitis A and B by his PCP

## 2022-09-06 DIAGNOSIS — R10.13 EPIGASTRIC PAIN: ICD-10-CM

## 2022-09-06 RX ORDER — PANTOPRAZOLE SODIUM 40 MG/1
TABLET, DELAYED RELEASE ORAL
Qty: 180 TABLET | Refills: 1 | Status: SHIPPED | OUTPATIENT
Start: 2022-09-06

## 2022-09-09 ENCOUNTER — HOSPITAL ENCOUNTER (OUTPATIENT)
Dept: NUCLEAR MEDICINE | Facility: HOSPITAL | Age: 47
End: 2022-09-09
Payer: COMMERCIAL

## 2022-09-09 DIAGNOSIS — R10.13 EPIGASTRIC PAIN: ICD-10-CM

## 2022-09-09 PROCEDURE — G1004 CDSM NDSC: HCPCS

## 2022-09-09 PROCEDURE — A9537 TC99M MEBROFENIN: HCPCS

## 2022-09-09 PROCEDURE — 78227 HEPATOBIL SYST IMAGE W/DRUG: CPT

## 2022-09-09 RX ADMIN — SINCALIDE 2.2 MCG: 5 INJECTION, POWDER, LYOPHILIZED, FOR SOLUTION INTRAVENOUS at 12:20

## 2022-09-13 ENCOUNTER — TELEPHONE (OUTPATIENT)
Dept: GASTROENTEROLOGY | Facility: CLINIC | Age: 47
End: 2022-09-13

## 2022-09-13 NOTE — TELEPHONE ENCOUNTER
----- Message from Ivelisse Ernandez PA-C sent at 9/13/2022 10:38 AM EDT -----  Please inform patient these results: normal hepatobiliary study

## 2022-09-16 ENCOUNTER — HOSPITAL ENCOUNTER (OUTPATIENT)
Dept: CT IMAGING | Facility: HOSPITAL | Age: 47
Discharge: HOME | End: 2022-09-16
Payer: COMMERCIAL

## 2022-09-16 DIAGNOSIS — R10.13 EPIGASTRIC PAIN: ICD-10-CM

## 2022-09-16 PROCEDURE — G1004 CDSM NDSC: HCPCS

## 2022-09-16 PROCEDURE — 74177 CT ABD & PELVIS W/CONTRAST: CPT

## 2022-09-16 RX ADMIN — IOHEXOL 100 ML: 350 INJECTION, SOLUTION INTRAVENOUS at 13:53

## 2022-09-21 ENCOUNTER — TELEPHONE (OUTPATIENT)
Dept: GASTROENTEROLOGY | Facility: CLINIC | Age: 47
End: 2022-09-21

## 2022-09-21 NOTE — TELEPHONE ENCOUNTER
----- Message from Yi Nunes PA-C sent at 9/21/2022 12:43 PM EDT -----  Please inform patient that the CT Scan A/P showed his known fatty liver and a 3mm nonobstructing right kidney stone  His pancreas was normal   Plan to follow up with the gastric emptying study as scheduled

## 2022-09-23 ENCOUNTER — HOSPITAL ENCOUNTER (OUTPATIENT)
Dept: NUCLEAR MEDICINE | Facility: HOSPITAL | Age: 47
End: 2022-09-23
Payer: COMMERCIAL

## 2022-09-23 DIAGNOSIS — R10.13 EPIGASTRIC PAIN: ICD-10-CM

## 2022-09-23 PROCEDURE — 78264 GASTRIC EMPTYING IMG STUDY: CPT

## 2022-09-23 PROCEDURE — G1004 CDSM NDSC: HCPCS

## 2022-09-23 PROCEDURE — A9541 TC99M SULFUR COLLOID: HCPCS

## 2022-10-05 ENCOUNTER — TELEPHONE (OUTPATIENT)
Dept: OTHER | Facility: OTHER | Age: 47
End: 2022-10-05

## 2022-10-05 NOTE — TELEPHONE ENCOUNTER
Pt called to review lab work - informed on notes provided by Clinkle  Pt would like to schedule a F/U per NM gastric emptying results  Please call back and advise      Pt will also reach out to PCP to F/U on scheduled hep A and B vx

## 2022-11-03 ENCOUNTER — OFFICE VISIT (OUTPATIENT)
Dept: GASTROENTEROLOGY | Facility: CLINIC | Age: 47
End: 2022-11-03

## 2022-11-03 VITALS
SYSTOLIC BLOOD PRESSURE: 152 MMHG | WEIGHT: 240.6 LBS | HEIGHT: 72 IN | BODY MASS INDEX: 32.59 KG/M2 | DIASTOLIC BLOOD PRESSURE: 90 MMHG | RESPIRATION RATE: 18 BRPM

## 2022-11-03 DIAGNOSIS — K21.9 GASTROESOPHAGEAL REFLUX DISEASE, UNSPECIFIED WHETHER ESOPHAGITIS PRESENT: Primary | ICD-10-CM

## 2022-11-03 DIAGNOSIS — R10.13 EPIGASTRIC PAIN: ICD-10-CM

## 2022-11-03 DIAGNOSIS — K76.0 FATTY LIVER: ICD-10-CM

## 2022-11-03 RX ORDER — PANTOPRAZOLE SODIUM 40 MG/1
40 TABLET, DELAYED RELEASE ORAL 2 TIMES DAILY
Qty: 180 TABLET | Refills: 1 | Status: SHIPPED | OUTPATIENT
Start: 2022-11-03 | End: 2023-02-01

## 2022-11-03 NOTE — PROGRESS NOTES
[facility] - Outpatient Follow-up Note  Villa Shaffer 49 y o  male MRN: [id number]  Encounter: [id number]          ASSESSMENT AND PLAN:      1  Gastroesophageal reflux disease  2  Epigastric pain    Patient presents for follow up of his GERD with postprandial epigastric/chest discomfort  EGD 7/13 showed mild erythema in the antrum and prepyloric region; biopsies were benign and negative for h pylori  PIPIDA Scan 9/9 was normal   CT Scan A/P 9/13 showed a fatty liver, 3mm nonobstructing right kidney stone; pancreas was normal   Gastric emptying study 9/23 was normal   He reports improvement in his symptoms on the Pantoprazole 40mg po BID course and avoidance of trigger foods  He also recently stopped the Plaquenil he was previously on which seems to be helping as well  He does unfortunately take frequent NSAIDs due to his arthritis  Will continue Pantoprazole 40mg po BID  GERD modifications  Weight loss  We could consider esophageal manometry testing to rule out a nutcracker esophagus, RODRIGUEZ - however, symptoms are improving at this time with GERD treatment so will hold off  Try to limit/avoid NSAIDs  3  Fatty liver    Ultrasound showed moderate steatosis  LFTs have been normal in the past and recently  Testing for Hep B and C negative in the past   He does not drink ETOH  BMI is 32 63  BUTTERFIELD fibrosure showed no fibrosis (F0) but did show severe steatosis  Discussed the diagnosis of a fatty liver and risk of progression to cirrhosis  Discussed the importance of weight loss with diet and exercise for treatment of his fatty liver  Discussed recommendations for Hepatitis A and B vaccinations (serological testing for immunity was negative)  Follow up in 3 months   ______________________________________________________________________    SUBJECTIVE:  Patient is a pleasant 49year old male who presents to the office for follow up    He reports he is doing better on the Pantoprazole BID  He reports his postprandial epigastric discomfort/chest discomfort is significantly less  No vomiting  He also recently stopped Plaquenil which seems to be helping too  He does still require frequent NSAIDs for his arthritis  He is also trying to avoid GERD trigger foods with benefit  He previously had a colonoscopy earlier this year with Dr Peters Areas  REVIEW OF SYSTEMS IS OTHERWISE NEGATIVE  Historical Information   Past Medical History:   Diagnosis Date   • Arthritis    • Chest pain    • Colon polyp    • GERD (gastroesophageal reflux disease)    • Hemorrhoids, complicated 65/33/7232   • Kidney stone    • Kidney stones    • Migraine    • Psoriasis    • Psoriatic arthritis ([address] )      Past Surgical History:   Procedure Laterality Date   • COLONOSCOPY     • CYSTOSCOPY     • HERNIA REPAIR     • KIDNEY STONE SURGERY     • [place] COLONOSCOPY FLX DX W/COLLJ SPEC WHEN PFRMD N/A 03/10/2017    Procedure: COLONOSCOPY;  Surgeon: Lorraine Escobar MD;  Location: [facility];   Service: Colorectal   • ULNAR NERVE REPAIR       Social History   Social History     Substance and Sexual Activity   Alcohol Use No     Social History     Substance and Sexual Activity   Drug Use No     Social History     Tobacco Use   Smoking Status Never Smoker   Smokeless Tobacco Never Used     Family History   Problem Relation Age of Onset   • Hypertension Mother    • Liver cancer Father        Meds/Allergies       Current Outpatient Medications:   •  Adalimumab (Humira Pen) 40 MG/0 8ML PNKT  •  alclomethasone (ACLOVATE) 0 05 % cream  •  aspirin 81 mg chewable tablet  •  azaTHIOprine (IMURAN) 50 mg tablet  •  Azelaic Acid 15 % cream  •  clotrimazole (LOTRIMIN) 1 % cream  •  Dermatological Products, Misc  (ELETONE) CREA  •  escitalopram (LEXAPRO) 10 mg tablet  •  famotidine (PEPCID) 20 mg tablet  •  gabapentin (NEURONTIN) 100 mg capsule  •  Halcinonide 0 1 % CREA  •  hydroxychloroquine (PLAQUENIL) 200 mg tablet  • loperamide (IMODIUM A-D) 2 MG tablet  •  metroNIDAZOLE (METROGEL) 1 % gel  •  Multiple Vitamin (MULTIVITAMIN) tablet  •  Naproxen Sodium (ALEVE PO)  •  pantoprazole (PROTONIX) 40 mg tablet  •  sucralfate (CARAFATE) 1 g/10 mL suspension    Allergies   Allergen Reactions   • Cetirizine Angioedema     Hives and tongue swelling and itching   • Zyvexol [Anti-Oxidant] Anaphylaxis and Rash   • Etodolac Rash     Has tolerated other NSAIDs without reaction   • Leflunomide Itching, GI Intolerance and Swelling           Objective     Blood pressure 152/90, resp  rate 18, height 6' (1 829 m), weight 109 kg (240 lb 9 6 oz)  Body mass index is 32 63 kg/m²  PHYSICAL EXAM:      General Appearance:   Alert, cooperative, no distress   HEENT:   Normocephalic, atraumatic, anicteric      Neck:  Supple, symmetrical, trachea midline   Lungs:   Clear to auscultation bilaterally; no rales, rhonchi or wheezing; respirations unlabored    Heart[de-identified]   Regular rate and rhythm; no murmur, rub, or gallop  Abdomen:   Soft, non-tender, non-distended; normal bowel sounds; no masses, no organomegaly    Genitalia:   Deferred    Rectal:   Deferred    Extremities:  No cyanosis, clubbing or edema    Pulses:  2+ and symmetric    Skin:  No jaundice, rashes, or lesions    Lymph nodes:  No palpable cervical lymphadenopathy        Lab Results:   No visits with results within 1 Day(s) from this visit     Latest known visit with results is:   Appointment on 08/20/2022   Component Date Value   • Sodium 08/20/2022 142    • Potassium 08/20/2022 3 8    • Chloride 08/20/2022 105    • CO2 08/20/2022 26    • ANION GAP 08/20/2022 11    • BUN 08/20/2022 17    • Creatinine 08/20/2022 0 95    • Glucose, Fasting 08/20/2022 91    • Calcium 08/20/2022 9 2    • AST 08/20/2022 29    • ALT 08/20/2022 42    • Alkaline Phosphatase 08/20/2022 58    • Total Protein 08/20/2022 7 3    • Albumin 08/20/2022 4 3    • Total Bilirubin 08/20/2022 0 66    • eGFR 08/20/2022 94    • Glucose, Random 2022 81    • Cholesterol, Total 2022 207 (A)   • BILIRUBIN, TOTAL 2022 0 5    • AST (SGOT) P5P 2022 29    • Triglycerides 2022 225 (A)   • ALT (SGPT) 2022 47    • Haptoglobin 2022 74    • GGT 2022 31    • Apolipoprotein A-1 2022 136    • Alpha 2-Macroglobulins, * 2022 103 (A)   • Comments 2022 Comment    • Fibrosis Scorin2022 Comment    • Necroinflammat Activity * 2022 Comment    • Interpretation: 2022 Comment    • BUTTERFIELD Scoring 2022 Comment    • Fibrosis Score 2022 0 11    • Fibrosis Stage 2022 Comment    • Steatosis Score 2022 0 69 (A)   • Steatosis Grade 2022 Comment    • Height 2022 72    • Weight 2022 233    • Necroinflammat Activity * 2022 0 50 (A)   • Limitations: 2022 Comment    • Steatosis Grading 2022 Comment    • Hep A Total Ab 2022 Non-reactive    • Hep B S Ab 2022 <3 10          Radiology Results:   No results found

## 2023-02-24 ENCOUNTER — OFFICE VISIT (OUTPATIENT)
Dept: GASTROENTEROLOGY | Facility: CLINIC | Age: 48
End: 2023-02-24

## 2023-02-24 VITALS
RESPIRATION RATE: 18 BRPM | HEIGHT: 72 IN | BODY MASS INDEX: 33.18 KG/M2 | WEIGHT: 245 LBS | DIASTOLIC BLOOD PRESSURE: 100 MMHG | SYSTOLIC BLOOD PRESSURE: 146 MMHG

## 2023-02-24 DIAGNOSIS — K76.0 FATTY LIVER: ICD-10-CM

## 2023-02-24 DIAGNOSIS — E66.9 OBESITY, UNSPECIFIED CLASSIFICATION, UNSPECIFIED OBESITY TYPE, UNSPECIFIED WHETHER SERIOUS COMORBIDITY PRESENT: ICD-10-CM

## 2023-02-24 DIAGNOSIS — K21.9 GASTROESOPHAGEAL REFLUX DISEASE, UNSPECIFIED WHETHER ESOPHAGITIS PRESENT: Primary | ICD-10-CM

## 2023-02-24 RX ORDER — FLUTICASONE PROPIONATE 50 MCG
SPRAY, SUSPENSION (ML) NASAL
COMMUNITY
Start: 2023-01-07

## 2023-02-24 RX ORDER — GABAPENTIN 300 MG/1
CAPSULE ORAL
COMMUNITY
Start: 2023-01-13

## 2023-02-24 RX ORDER — RISANKIZUMAB-RZAA 150 MG/ML
INJECTION SUBCUTANEOUS
COMMUNITY
Start: 2023-02-13

## 2023-02-24 NOTE — PROGRESS NOTES
[facility] - Outpatient Follow-up Note  Laurence Gallardo 49 y o  male MRN: [id number]  Encounter: [id number]          ASSESSMENT AND PLAN:      1  Gastroesophageal reflux disease    Patient presents for follow up of his GERD with postprandial epigastric/chest discomfort  EGD 7/13/22 showed mild erythema in the antrum and prepyloric region; biopsies were benign and negative for h pylori  US 6/6/22 showed a normal gallbladder  PIPIDA Scan 9/9/22 was normal   CT Scan A/P 9/13/22 showed a fatty liver, 3mm nonobstructing right kidney stone; pancreas was normal   Gastric emptying study 9/23/22 was normal     He is doing fairly well on the Pantoprazole BID with improvement in his symptoms  Will continue this dose as he is currently on frequent NSAIDs for his psoriatic arthritis  When he is able to stop the frequent NSAIDs, recommend that he try decreasing the Pantoprazole to once a day  GERD modifications  Weight loss recommended  We could consider esophageal manometry testing to rule out a nutcracker esophagus, RODRIGUEZ if needed- however, symptoms have improved at this time with GERD treatment so will hold off     2  Fatty liver  3  Obesity    Prior ultrasound showed moderate steatosis  Testing for Hep B and C negative in the past   He does not drink ETOH   BMI is 33 23  BUTTERFIELD fibrosure showed no fibrosis (F0) but did show severe steatosis  Discussed the diagnosis of a fatty liver and risk of progression to cirrhosis  Discussed the importance of weight loss with diet and exercise for treatment of his fatty liver  Discussed recommendations for Hepatitis A and B vaccinations (serological testing for immunity was negative)  Will refer to weight management  Periodic monitoring of LFTs  Follow up in 6 months or sooner if needed    ______________________________________________________________________    SUBJECTIVE:  Patient is a pleasant 49year old male who presents to the office for follow up of his GERD  He reports he is doing well overall on the Pantoprazole BID  His postprandial chest/epigastric discomfort has improved  He is still requiring frequent NSAIDs for his arthritis  He just started a new treatment for his Psoriasis- Skyrizi and hopefully will be able to stop the NSAIDs soon  He had a colonoscopy last year with Dr Deep Muro  He reports he is working on diet changes but hasn't been able to lose weight and is interested in seeing weight management  REVIEW OF SYSTEMS IS OTHERWISE NEGATIVE  Historical Information   Past Medical History:   Diagnosis Date   • Arthritis    • Chest pain    • Colon polyp    • GERD (gastroesophageal reflux disease)    • Hemorrhoids, complicated 54/50/7027   • Kidney stone    • Kidney stones    • Migraine    • Psoriasis    • Psoriatic arthritis ([address] )      Past Surgical History:   Procedure Laterality Date   • COLONOSCOPY     • CYSTOSCOPY     • HERNIA REPAIR     • KIDNEY STONE SURGERY     • DE COLONOSCOPY FLX DX W/COLLJ SPEC WHEN PFRMD N/A 03/10/2017    Procedure: COLONOSCOPY;  Surgeon: Debrah Crigler, MD;  Location: [facility];   Service: Colorectal   • ULNAR NERVE REPAIR       Social History   Social History     Substance and Sexual Activity   Alcohol Use No     Social History     Substance and Sexual Activity   Drug Use No     Social History     Tobacco Use   Smoking Status Never   Smokeless Tobacco Never     Family History   Problem Relation Age of Onset   • Hypertension Mother    • Liver cancer Father        Meds/Allergies       Current Outpatient Medications:   •  alclomethasone (ACLOVATE) 0 05 % cream  •  aspirin 81 mg chewable tablet  •  Azelaic Acid 15 % cream  •  clotrimazole (LOTRIMIN) 1 % cream  •  Dermatological Products, Misc  (ELETONE) CREA  •  escitalopram (LEXAPRO) 10 mg tablet  •  famotidine (PEPCID) 20 mg tablet  •  fluticasone (FLONASE) 50 mcg/act nasal spray  •  gabapentin (NEURONTIN) 100 mg capsule  •  gabapentin (NEURONTIN) 300 mg capsule  •  Halcinonide 0 1 % CREA  •  loperamide (IMODIUM A-D) 2 MG tablet  •  metroNIDAZOLE (METROGEL) 1 % gel  •  Multiple Vitamin (MULTIVITAMIN) tablet  •  Naproxen Sodium (ALEVE PO)  •  pantoprazole (PROTONIX) 40 mg tablet  •  Skyrizi Pen 150 MG/ML SOAJ  •  sucralfate (CARAFATE) 1 g/10 mL suspension    Allergies   Allergen Reactions   • Cetirizine Angioedema     Hives and tongue swelling and itching   • Zyvexol [Anti-Oxidant] Anaphylaxis and Rash   • Etodolac Rash     Has tolerated other NSAIDs without reaction   • Leflunomide Itching, GI Intolerance and Swelling           Objective     Blood pressure 146/100, resp  rate 18, height 6' (1 829 m), weight 111 kg (245 lb)  Body mass index is 33 23 kg/m²  PHYSICAL EXAM:      General Appearance:   Alert, cooperative, no distress   HEENT:   Normocephalic, atraumatic, anicteric      Neck:  Supple, symmetrical, trachea midline   Lungs:   Clear to auscultation bilaterally; no rales, rhonchi or wheezing; respirations unlabored    Heart[de-identified]   Regular rate and rhythm; no murmur, rub, or gallop  Abdomen:   Soft, non-tender, non-distended; normal bowel sounds; no masses, no organomegaly    Genitalia:   Deferred    Rectal:   Deferred    Extremities:  No cyanosis, clubbing or edema    Pulses:  2+ and symmetric    Skin:  No jaundice, rashes, or lesions    Lymph nodes:  No palpable cervical lymphadenopathy        Lab Results:   No visits with results within 1 Day(s) from this visit     Latest known visit with results is:   Appointment on 08/20/2022   Component Date Value   • Sodium 08/20/2022 142    • Potassium 08/20/2022 3 8    • Chloride 08/20/2022 105    • CO2 08/20/2022 26    • ANION GAP 08/20/2022 11    • BUN 08/20/2022 17    • Creatinine 08/20/2022 0 95    • Glucose, Fasting 08/20/2022 91    • Calcium 08/20/2022 9 2    • AST 08/20/2022 29    • ALT 08/20/2022 42    • Alkaline Phosphatase 08/20/2022 58    • Total Protein 08/20/2022 7 3    • Albumin 2022 4 3    • Total Bilirubin 2022 0 66    • eGFR 2022 94    • Glucose, Random 2022 81    • Cholesterol, Total 2022 207 (H)    • BILIRUBIN, TOTAL 2022 0 5    • AST (SGOT) P5P 2022 29    • Triglycerides 2022 225 (H)    • ALT (SGPT) 2022 47    • Haptoglobin 2022 74    • GGT 2022 31    • Apolipoprotein A-1 2022 136    • Alpha 2-Macroglobulins, * 2022 103 (L)    • Comments 2022 Comment    • Fibrosis Scorin2022 Comment    • Necroinflammat Activity * 2022 Comment    • Interpretation: 2022 Comment    • BUTTERFIELD Scoring 2022 Comment    • Fibrosis Score 2022 0 11    • Fibrosis Stage 2022 Comment    • Steatosis Score 2022 0 69 (H)    • Steatosis Grade 2022 Comment    • Height 2022 72    • Weight 2022 233    • Necroinflammat Activity * 2022 0 50 (H)    • Limitations: 2022 Comment    • Steatosis Grading 2022 Comment    • Hep A Total Ab 2022 Non-reactive    • Hep B S Ab 2022 <3 10          Radiology Results:   No results found

## 2023-03-09 DIAGNOSIS — R10.13 EPIGASTRIC PAIN: ICD-10-CM

## 2023-03-09 RX ORDER — PANTOPRAZOLE SODIUM 40 MG/1
40 TABLET, DELAYED RELEASE ORAL 2 TIMES DAILY
Qty: 180 TABLET | Refills: 1 | Status: SHIPPED | OUTPATIENT
Start: 2023-03-09 | End: 2023-06-07

## 2023-12-14 ENCOUNTER — HOSPITAL ENCOUNTER (EMERGENCY)
Facility: HOSPITAL | Age: 48
Discharge: HOME | End: 2023-12-14
Attending: EMERGENCY MEDICINE | Admitting: EMERGENCY MEDICINE
Payer: COMMERCIAL

## 2023-12-14 ENCOUNTER — APPOINTMENT (EMERGENCY)
Dept: RADIOLOGY | Facility: HOSPITAL | Age: 48
End: 2023-12-14
Payer: COMMERCIAL

## 2023-12-14 ENCOUNTER — APPOINTMENT (EMERGENCY)
Dept: CT IMAGING | Facility: HOSPITAL | Age: 48
End: 2023-12-14
Payer: COMMERCIAL

## 2023-12-14 VITALS
OXYGEN SATURATION: 96 % | DIASTOLIC BLOOD PRESSURE: 84 MMHG | TEMPERATURE: 98 F | BODY MASS INDEX: 31.83 KG/M2 | WEIGHT: 235 LBS | SYSTOLIC BLOOD PRESSURE: 137 MMHG | RESPIRATION RATE: 22 BRPM | HEIGHT: 72 IN | HEART RATE: 62 BPM

## 2023-12-14 DIAGNOSIS — U07.1 COVID-19: Primary | ICD-10-CM

## 2023-12-14 LAB
2HR DELTA HS TROPONIN: 2 NG/L
ALBUMIN SERPL BCP-MCNC: 4.6 G/DL (ref 3.5–5)
ALP SERPL-CCNC: 50 U/L (ref 34–104)
ALT SERPL W P-5'-P-CCNC: 53 U/L (ref 7–52)
ANION GAP SERPL CALCULATED.3IONS-SCNC: 6 MMOL/L
AST SERPL W P-5'-P-CCNC: 34 U/L (ref 13–39)
BASOPHILS # BLD AUTO: 0.06 THOUSANDS/ÂΜL (ref 0–0.1)
BASOPHILS NFR BLD AUTO: 1 % (ref 0–1)
BILIRUB SERPL-MCNC: 0.79 MG/DL (ref 0.2–1)
BUN SERPL-MCNC: 18 MG/DL (ref 5–25)
CALCIUM SERPL-MCNC: 9.2 MG/DL (ref 8.4–10.2)
CARDIAC TROPONIN I PNL SERPL HS: 2 NG/L
CARDIAC TROPONIN I PNL SERPL HS: 4 NG/L
CHLORIDE SERPL-SCNC: 106 MMOL/L (ref 96–108)
CO2 SERPL-SCNC: 27 MMOL/L (ref 21–32)
CREAT SERPL-MCNC: 0.88 MG/DL (ref 0.6–1.3)
EOSINOPHIL # BLD AUTO: 0.12 THOUSAND/ÂΜL (ref 0–0.61)
EOSINOPHIL NFR BLD AUTO: 2 % (ref 0–6)
ERYTHROCYTE [DISTWIDTH] IN BLOOD BY AUTOMATED COUNT: 12.6 % (ref 11.6–15.1)
FLUAV RNA RESP QL NAA+PROBE: NEGATIVE
FLUBV RNA RESP QL NAA+PROBE: NEGATIVE
GFR SERPL CREATININE-BSD FRML MDRD: 100 ML/MIN/1.73SQ M
GLUCOSE SERPL-MCNC: 87 MG/DL (ref 65–140)
HCT VFR BLD AUTO: 46.3 % (ref 36.5–49.3)
HGB BLD-MCNC: 15.7 G/DL (ref 12–17)
IMM GRANULOCYTES # BLD AUTO: 0.03 THOUSAND/UL (ref 0–0.2)
IMM GRANULOCYTES NFR BLD AUTO: 1 % (ref 0–2)
LACTATE SERPL-SCNC: 0.7 MMOL/L (ref 0.5–2)
LIPASE SERPL-CCNC: 33 U/L (ref 11–82)
LYMPHOCYTES # BLD AUTO: 1.6 THOUSANDS/ÂΜL (ref 0.6–4.47)
LYMPHOCYTES NFR BLD AUTO: 28 % (ref 14–44)
MCH RBC QN AUTO: 30.8 PG (ref 26.8–34.3)
MCHC RBC AUTO-ENTMCNC: 33.9 G/DL (ref 31.4–37.4)
MCV RBC AUTO: 91 FL (ref 82–98)
MONOCYTES # BLD AUTO: 0.65 THOUSAND/ÂΜL (ref 0.17–1.22)
MONOCYTES NFR BLD AUTO: 12 % (ref 4–12)
NEUTROPHILS # BLD AUTO: 3.21 THOUSANDS/ÂΜL (ref 1.85–7.62)
NEUTS SEG NFR BLD AUTO: 56 % (ref 43–75)
NRBC BLD AUTO-RTO: 0 /100 WBCS
PLATELET # BLD AUTO: 257 THOUSANDS/UL (ref 149–390)
PMV BLD AUTO: 11.7 FL (ref 8.9–12.7)
POTASSIUM SERPL-SCNC: 4.2 MMOL/L (ref 3.5–5.3)
PROT SERPL-MCNC: 7.3 G/DL (ref 6.4–8.4)
RBC # BLD AUTO: 5.1 MILLION/UL (ref 3.88–5.62)
RSV RNA RESP QL NAA+PROBE: NEGATIVE
SARS-COV-2 RNA RESP QL NAA+PROBE: POSITIVE
SODIUM SERPL-SCNC: 139 MMOL/L (ref 135–147)
WBC # BLD AUTO: 5.67 THOUSAND/UL (ref 4.31–10.16)

## 2023-12-14 PROCEDURE — 85025 COMPLETE CBC W/AUTO DIFF WBC: CPT | Performed by: PHYSICIAN ASSISTANT

## 2023-12-14 PROCEDURE — 80053 COMPREHEN METABOLIC PANEL: CPT | Performed by: PHYSICIAN ASSISTANT

## 2023-12-14 PROCEDURE — 93005 ELECTROCARDIOGRAM TRACING: CPT

## 2023-12-14 PROCEDURE — 99285 EMERGENCY DEPT VISIT HI MDM: CPT | Performed by: PHYSICIAN ASSISTANT

## 2023-12-14 PROCEDURE — 96361 HYDRATE IV INFUSION ADD-ON: CPT

## 2023-12-14 PROCEDURE — 83690 ASSAY OF LIPASE: CPT | Performed by: PHYSICIAN ASSISTANT

## 2023-12-14 PROCEDURE — 71046 X-RAY EXAM CHEST 2 VIEWS: CPT

## 2023-12-14 PROCEDURE — 96374 THER/PROPH/DIAG INJ IV PUSH: CPT

## 2023-12-14 PROCEDURE — 99284 EMERGENCY DEPT VISIT MOD MDM: CPT

## 2023-12-14 PROCEDURE — 83605 ASSAY OF LACTIC ACID: CPT | Performed by: PHYSICIAN ASSISTANT

## 2023-12-14 PROCEDURE — 96375 TX/PRO/DX INJ NEW DRUG ADDON: CPT

## 2023-12-14 PROCEDURE — 84484 ASSAY OF TROPONIN QUANT: CPT | Performed by: PHYSICIAN ASSISTANT

## 2023-12-14 PROCEDURE — 0241U HB NFCT DS VIR RESP RNA 4 TRGT: CPT | Performed by: PHYSICIAN ASSISTANT

## 2023-12-14 PROCEDURE — 74177 CT ABD & PELVIS W/CONTRAST: CPT

## 2023-12-14 PROCEDURE — 36415 COLL VENOUS BLD VENIPUNCTURE: CPT | Performed by: PHYSICIAN ASSISTANT

## 2023-12-14 RX ORDER — FAMOTIDINE 20 MG/1
20 TABLET, FILM COATED ORAL 2 TIMES DAILY
Qty: 10 TABLET | Refills: 0 | Status: SHIPPED | OUTPATIENT
Start: 2023-12-14 | End: 2023-12-19

## 2023-12-14 RX ORDER — KETOROLAC TROMETHAMINE 30 MG/ML
15 INJECTION, SOLUTION INTRAMUSCULAR; INTRAVENOUS ONCE
Status: COMPLETED | OUTPATIENT
Start: 2023-12-14 | End: 2023-12-14

## 2023-12-14 RX ORDER — ONDANSETRON 2 MG/ML
4 INJECTION INTRAMUSCULAR; INTRAVENOUS ONCE
Status: COMPLETED | OUTPATIENT
Start: 2023-12-14 | End: 2023-12-14

## 2023-12-14 RX ADMIN — KETOROLAC TROMETHAMINE 15 MG: 30 INJECTION, SOLUTION INTRAMUSCULAR at 20:05

## 2023-12-14 RX ADMIN — ONDANSETRON 4 MG: 2 INJECTION INTRAMUSCULAR; INTRAVENOUS at 20:05

## 2023-12-14 RX ADMIN — SODIUM CHLORIDE 1000 ML: 0.9 INJECTION, SOLUTION INTRAVENOUS at 20:05

## 2023-12-14 RX ADMIN — IOHEXOL 100 ML: 350 INJECTION, SOLUTION INTRAVENOUS at 20:48

## 2023-12-14 NOTE — Clinical Note
Oanh Skyler was seen and treated in our emergency department on 12/14/2023. Diagnosis:     Dejon Dee  may return to work on return date. He may return on this date: 12/18/2023         If you have any questions or concerns, please don't hesitate to call.       Joel Rosa PA-C    ______________________________           _______________          _______________  Hospital Representative                              Date                                Time

## 2023-12-15 LAB
ATRIAL RATE: 67 BPM
ATRIAL RATE: 71 BPM
P AXIS: 46 DEGREES
P AXIS: 47 DEGREES
PR INTERVAL: 156 MS
PR INTERVAL: 164 MS
QRS AXIS: 65 DEGREES
QRS AXIS: 75 DEGREES
QRSD INTERVAL: 92 MS
QRSD INTERVAL: 94 MS
QT INTERVAL: 420 MS
QT INTERVAL: 422 MS
QTC INTERVAL: 445 MS
QTC INTERVAL: 456 MS
T WAVE AXIS: -88 DEGREES
T WAVE AXIS: 22 DEGREES
VENTRICULAR RATE: 67 BPM
VENTRICULAR RATE: 71 BPM

## 2023-12-15 NOTE — ED PROVIDER NOTES
History  Chief Complaint   Patient presents with    Abdominal Pain     Pt reports he's been having abd pain x 2 days, and today he was having left arm pain, nausea, and headaches. 50 yo with multiple complaints. Reports diffuse abd pain, chest wall pain, left arm pain and headache for past two days. No cough, congestion, fever or chills. No sob. No leg pain or swelling. No n/v/d. No sick contacts that he is aware of. The headache is fairly constant. No vision changes. No extremity numbness, tingling, weakness. No trauma or injuries. No new or unusual foods. History provided by:  Patient   used: No    Abdominal Pain  Associated symptoms: chest pain    Associated symptoms: no chills, no cough, no dysuria, no fever, no hematuria, no shortness of breath, no sore throat and no vomiting        Prior to Admission Medications   Prescriptions Last Dose Informant Patient Reported? Taking?    Azelaic Acid 15 % cream  Self Yes No   Sig: As needed   Dermatological Products, Misc. (ELETONE) CREA  Self Yes No   Sig: As needed   Halcinonide 0.1 % CREA  Self Yes No   Sig: Apply topically   Multiple Vitamin (MULTIVITAMIN) tablet  Self Yes No   Sig: Take 1 tablet by mouth daily   Naproxen Sodium (ALEVE PO)  Self Yes No   Sig: Take by mouth 2 tabs in the am and 2 in the pm   Skyrizi Pen 150 MG/ML SOAJ   Yes No   alclomethasone (ACLOVATE) 0.05 % cream  Self Yes No   Sig: Apply topically   aspirin 81 mg chewable tablet  Self No No   Sig: Chew 1 tablet (81 mg total) daily   clotrimazole (LOTRIMIN) 1 % cream  Self Yes No   Sig: APPLY TO RASH ON FEET TWICE DAILY FOR 2-4 WEEKS   escitalopram (LEXAPRO) 10 mg tablet  Self Yes No   famotidine (PEPCID) 20 mg tablet  Self No No   Sig: Take 1 tablet (20 mg total) by mouth 2 (two) times a day   fluticasone (FLONASE) 50 mcg/act nasal spray   Yes No   Sig: SPRAY 2 SPRAYS INTO EACH NOSTRIL EVERY DAY   gabapentin (NEURONTIN) 100 mg capsule  Self Yes No   Sig: Take 100 mg by mouth 3 (three) times a day     gabapentin (NEURONTIN) 300 mg capsule   Yes No   loperamide (IMODIUM A-D) 2 MG tablet  Self Yes No   Sig: Take 2 mg by mouth 4 (four) times a day as needed   metroNIDAZOLE (METROGEL) 1 % gel  Self Yes No   Sig: As needed   pantoprazole (PROTONIX) 40 mg tablet   No No   Sig: Take 1 tablet (40 mg total) by mouth 2 (two) times a day   sucralfate (CARAFATE) 1 g/10 mL suspension  Self No No   Sig: Take 10 mL (1 g total) by mouth 4 (four) times a day      Facility-Administered Medications: None       Past Medical History:   Diagnosis Date    Arthritis     Chest pain     Colon polyp     GERD (gastroesophageal reflux disease)     Hemorrhoids, complicated 19/22/4457    Kidney stone     Kidney stones     Migraine     Psoriasis     Psoriatic arthritis ([address])        Past Surgical History:   Procedure Laterality Date    COLONOSCOPY      CYSTOSCOPY      HERNIA REPAIR      KIDNEY STONE SURGERY      SC COLONOSCOPY FLX DX W/COLLJ SPEC WHEN PFRMD N/A 03/10/2017    Procedure: COLONOSCOPY;  Surgeon: Luis Field MD;  Location: [facility]; Service: Colorectal    ULNAR NERVE REPAIR         Family History   Problem Relation Age of Onset    Hypertension Mother     Liver cancer Father      I have reviewed and agree with the history as documented. E-Cigarette/Vaping    E-Cigarette Use Never User      E-Cigarette/Vaping Substances    Nicotine No     THC No     CBD No     Flavoring No     Other No     Unknown No      Social History     Tobacco Use    Smoking status: Never    Smokeless tobacco: Never   Vaping Use    Vaping status: Never Used   Substance Use Topics    Alcohol use: No    Drug use: No       Review of Systems   Constitutional:  Negative for chills and fever. HENT:  Negative for ear pain and sore throat. Eyes:  Negative for pain and visual disturbance. Respiratory:  Negative for cough and shortness of breath. Cardiovascular:  Positive for chest pain. Negative for palpitations. Gastrointestinal:  Positive for abdominal pain. Negative for vomiting. Genitourinary:  Negative for dysuria and hematuria. Musculoskeletal:  Negative for arthralgias and back pain. Skin:  Negative for color change and rash. Neurological:  Positive for headaches. Negative for seizures and syncope. All other systems reviewed and are negative. Physical Exam  Physical Exam  Vitals and nursing note reviewed. Constitutional:       General: He is not in acute distress. Appearance: He is well-developed. HENT:      Head: Normocephalic and atraumatic. Eyes:      Conjunctiva/sclera: Conjunctivae normal.   Cardiovascular:      Rate and Rhythm: Normal rate and regular rhythm. Heart sounds: No murmur heard. Pulmonary:      Effort: Pulmonary effort is normal. No respiratory distress. Breath sounds: Normal breath sounds. Abdominal:      Palpations: Abdomen is soft. Tenderness: There is generalized abdominal tenderness. Musculoskeletal:         General: No swelling. Cervical back: Neck supple. Skin:     General: Skin is warm and dry. Capillary Refill: Capillary refill takes less than 2 seconds. Neurological:      Mental Status: He is alert and oriented to person, place, and time. GCS: GCS eye subscore is 4. GCS verbal subscore is 5. GCS motor subscore is 6. Comments: GCS 15. AAOx3. Ambulating in department without difficulty. CN II-XII grossly intact. No focal neuro deficits.      Psychiatric:         Mood and Affect: Mood normal.         Vital Signs  ED Triage Vitals   Temperature Pulse Respirations Blood Pressure SpO2   12/14/23 1831 12/14/23 1831 12/14/23 1831 12/14/23 1831 12/14/23 1831   98 °F (36.7 °C) 78 18 139/90 99 %      Temp Source Heart Rate Source Patient Position - Orthostatic VS BP Location FiO2 (%)   12/14/23 1831 12/14/23 1831 12/14/23 1831 12/14/23 1831 --   Temporal Monitor Sitting Left arm       Pain Score       12/14/23 2005       8 Vitals:    12/14/23 2100 12/14/23 2130 12/14/23 2200 12/14/23 2300   BP: 138/83 135/80 148/98 137/84   Pulse: 67 60 62 62   Patient Position - Orthostatic VS: Sitting            Visual Acuity      ED Medications  Medications   sodium chloride 0.9 % bolus 1,000 mL (0 mL Intravenous Stopped 12/14/23 2158)   ondansetron (ZOFRAN) injection 4 mg (4 mg Intravenous Given 12/14/23 2005)   ketorolac (TORADOL) injection 15 mg (15 mg Intravenous Given 12/14/23 2005)   iohexol (OMNIPAQUE) 350 MG/ML injection (MULTI-DOSE) 100 mL (100 mL Intravenous Given 12/14/23 2048)       Diagnostic Studies  Results Reviewed       Procedure Component Value Units Date/Time    HS Troponin I 2hr [[id number]]  (Normal) Collected: 12/14/23 2158    Lab Status: Final result Specimen: Blood from Arm, Right Updated: 12/14/23 2232     hs TnI 2hr 4 ng/L      Delta 2hr hsTnI 2 ng/L     FLU/RSV/COVID - if FLU/RSV clinically relevant [[id number]]  (Abnormal) Collected: 12/14/23 2003    Lab Status: Final result Specimen: Nares from Nose Updated: 12/14/23 2105     SARS-CoV-2 Positive     INFLUENZA A PCR Negative     INFLUENZA B PCR Negative     RSV PCR Negative    Narrative:      FOR PEDIATRIC PATIENTS - copy/paste COVID Guidelines URL to browser: [email or web address]    SARS-CoV-2 assay is a Nucleic Acid Amplification assay intended for the  qualitative detection of nucleic acid from SARS-CoV-2 in nasopharyngeal  swabs. Results are for the presumptive identification of SARS-CoV-2 RNA. Positive results are indicative of infection with SARS-CoV-2, the virus  causing COVID-19, but do not rule out bacterial infection or co-infection  with other viruses. Laboratories within the [facility] and its  territories are required to report all positive results to the appropriate  public health authorities.  Negative results do not preclude SARS-CoV-2  infection and should not be used as the sole basis for treatment or other  patient management decisions. Negative results must be combined with  clinical observations, patient history, and epidemiological information. This test has not been FDA cleared or approved. This test has been authorized by FDA under an Emergency Use Authorization  (EUA). This test is only authorized for the duration of time the  declaration that circumstances exist justifying the authorization of the  emergency use of an in vitro diagnostic tests for detection of SARS-CoV-2  virus and/or diagnosis of COVID-19 infection under section 564(b)(1) of  the Act, 21 U. S.C. 927DJD-6(F)(1), unless the authorization is terminated  or revoked sooner. The test has been validated but independent review by FDA  and CLIA is pending. Test performed using [employer]: This RT-PCR assay targets N2,  a region unique to SARS-CoV-2. A conserved region in the E-gene was chosen  for pan-Sarbecovirus detection which includes SARS-CoV-2. According to CMS-2020-01-R, this platform meets the definition of high-throughput technology.     HS Troponin I 4hr [[id number]]     Lab Status: No result Specimen: Blood     HS Troponin 0hr (reflex protocol) [[id number]]  (Normal) Collected: 12/14/23 2003    Lab Status: Final result Specimen: Blood from Arm, Right Updated: 12/14/23 2043     hs TnI 0hr 2 ng/L     Comprehensive metabolic panel [[id number]]  (Abnormal) Collected: 12/14/23 2003    Lab Status: Final result Specimen: Blood from Arm, Right Updated: 12/14/23 2037     Sodium 139 mmol/L      Potassium 4.2 mmol/L      Chloride 106 mmol/L      CO2 27 mmol/L      ANION GAP 6 mmol/L      BUN 18 mg/dL      Creatinine 0.88 mg/dL      Glucose 87 mg/dL      Calcium 9.2 mg/dL      AST 34 U/L      ALT 53 U/L      Alkaline Phosphatase 50 U/L      Total Protein 7.3 g/dL      Albumin 4.6 g/dL      Total Bilirubin 0.79 mg/dL      eGFR 100 ml/min/1.73sq m     Narrative:      [place] guidelines for Chronic Kidney Disease (CKD):     Stage 1 with normal or high GFR (GFR > 90 mL/min/1.73 square meters)    Stage 2 Mild CKD (GFR = 60-89 mL/min/1.73 square meters)    Stage 3A Moderate CKD (GFR = 45-59 mL/min/1.73 square meters)    Stage 3B Moderate CKD (GFR = 30-44 mL/min/1.73 square meters)    Stage 4 Severe CKD (GFR = 15-29 mL/min/1.73 square meters)    Stage 5 End Stage CKD (GFR <15 mL/min/1.73 square meters)  Note: GFR calculation is accurate only with a steady state creatinine    Lipase [[id number]]  (Normal) Collected: 12/14/23 2003    Lab Status: Final result Specimen: Blood from Arm, Right Updated: 12/14/23 2037     Lipase 33 u/L     Lactic acid, plasma (w/reflex if result > 2.0) [[id number]]  (Normal) Collected: 12/14/23 2003    Lab Status: Final result Specimen: Blood from Arm, Right Updated: 12/14/23 2036     LACTIC ACID 0.7 mmol/L     Narrative:      Result may be elevated if tourniquet was used during collection.     CBC and differential [[id number]] Collected: 12/14/23 2003    Lab Status: Final result Specimen: Blood from Arm, Right Updated: 12/14/23 2016     WBC 5.67 Thousand/uL      RBC 5.10 Million/uL      Hemoglobin 15.7 g/dL      Hematocrit 46.3 %      MCV 91 fL      MCH 30.8 pg      MCHC 33.9 g/dL      RDW 12.6 %      MPV 11.7 fL      Platelets 750 Thousands/uL      nRBC 0 /100 WBCs      Neutrophils Relative 56 %      Immat GRANS % 1 %      Lymphocytes Relative 28 %      Monocytes Relative 12 %      Eosinophils Relative 2 %      Basophils Relative 1 %      Neutrophils Absolute 3.21 Thousands/µL      Immature Grans Absolute 0.03 Thousand/uL      Lymphocytes Absolute 1.60 Thousands/µL      Monocytes Absolute 0.65 Thousand/µL      Eosinophils Absolute 0.12 Thousand/µL      Basophils Absolute 0.06 Thousands/µL                    CT abdomen pelvis with contrast   Final Result by Tatyana Fuentes MD (12/14 4712)      Focal wall thickening of the gastric antrum region with subtle adjacent mesenteric inflammatory stranding and a few likely reactive subcentimeter mesenteric lymph nodes noted. Findings may suggest focal gastritis or peptic ulcer disease in this region. Consider follow-up upper endoscopy when clinically appropriate for better evaluation and to exclude early neoplasm. Otherwise no evidence for bowel obstruction, inflammation, appendicitis, obstructive uropathy, free air, free fluid, or loculated fluid    collections in the abdomen/pelvis. Additional ancillary findings detailed above. Workstation performed: [id number]         XR chest 2 views    (Results Pending)              Procedures  ECG 12 Lead Documentation Only    Date/Time: 12/14/2023 10:15 PM    Performed by: Nancy De La Fuente PA-C  Authorized by: Nancy De La Fuente PA-C    ECG reviewed by me, the ED Provider: yes    Patient location:  ED  Interpretation:     Interpretation: normal    Quality:     Tracing quality:  Limited by artifact  Rate:     ECG rate:  71    ECG rate assessment: normal    Rhythm:     Rhythm: sinus rhythm    Ectopy:     Ectopy: none    QRS:     QRS axis:  Normal    QRS intervals:  Normal  Conduction:     Conduction: normal    ST segments:     ST segments:  Normal  T waves:     T waves: non-specific             ED Course  ED Course as of 12/14/23 2321   Thu Dec 14, 2023   2025 R/o PE via [facility]               HEART Risk Score      Flowsheet Row Most Recent Value   Heart Score Risk Calculator    History 0 Filed at: 12/14/2023 2307   ECG 1 Filed at: 12/14/2023 2307   Age 1 Filed at: 12/14/2023 2307   Risk Factors 1 Filed at: 12/14/2023 2307   Troponin 0 Filed at: 12/14/2023 2307   HEART Score 3 Filed at: 12/14/2023 2307                          SBIRT 20yo+      Flowsheet Row Most Recent Value   Initial Alcohol Screen: US AUDIT-C     1. How often do you have a drink containing alcohol? 0 Filed at: 12/14/2023 2011   2. How many drinks containing alcohol do you have on a typical day you are drinking?   0 Filed at: 12/14/2023 2011 3a. Male UNDER 65: How often do you have five or more drinks on one occasion? 0 Filed at: 12/14/2023 2011   Audit-C Score 0 Filed at: 12/14/2023 2011   REX: How many times in the past year have you. .. Used an illegal drug or used a prescription medication for non-medical reasons? Never Filed at: 12/14/2023 2011                      Medical Decision Making  Ddx includes but not limited to enteritis, diverticulitis, colitis, IBS, IBD, viral syndrome, covid, flu, rsv, tension headache, migraine, pneumonia, PE, ACS, ptx. Plan: XR chest. R/o PE via perc. Trop and EKG. Labs. CT a/p. Dispo pending. MDM: 52 yo with chest and abd pain. Headache. Labs unremarkable. EKG nonspecific. CT with evidence of gastritis vs PUD. Start antacid. COVID test positive. Could be having myalgia and tension/migraine headache in setting of covid. Recommended PCP f/u. Return parameters provided. Pt understands and agrees with plan. Amount and/or Complexity of Data Reviewed  Labs: ordered. Radiology: ordered. Risk  Prescription drug management. Disposition  Final diagnoses:   COVID-19     Time reflects when diagnosis was documented in both MDM as applicable and the Disposition within this note       Time User Action Codes Description Comment    12/14/2023 11:10 PM Malu Naylor Add [U07.1] COVID-19           ED Disposition       ED Disposition   Discharge    Condition   Stable    Date/Time   Thu Dec 14, 2023 2312    Comment   Tania Pérez discharge to home/self care.                    Follow-up Information       Follow up With Specialties Details Why Contact Info    Krystyna Shen, DO Internal Medicine   [address] [facility]  [phone]              Patient's Medications   Discharge Prescriptions    FAMOTIDINE (PEPCID) 20 MG TABLET    Take 1 tablet (20 mg total) by mouth 2 (two) times a day for 5 days       Start Date: 12/14/2023End Date: 12/19/2023       Order Dose: 20 mg Quantity: 10 tablet    Refills: 0       No discharge procedures on file.     PDMP Review       None            ED Provider  Electronically Signed by             Bandar Cardenas PA-C  12/14/23 5738

## 2024-04-22 ENCOUNTER — OFFICE VISIT (OUTPATIENT)
Dept: URGENT CARE | Facility: CLINIC | Age: 49
End: 2024-04-22
Payer: COMMERCIAL

## 2024-04-22 VITALS
HEART RATE: 83 BPM | SYSTOLIC BLOOD PRESSURE: 150 MMHG | TEMPERATURE: 97.3 F | RESPIRATION RATE: 19 BRPM | OXYGEN SATURATION: 98 % | DIASTOLIC BLOOD PRESSURE: 78 MMHG

## 2024-04-22 DIAGNOSIS — J06.9 ACUTE URI: Primary | ICD-10-CM

## 2024-04-22 PROCEDURE — 99204 OFFICE O/P NEW MOD 45 MIN: CPT | Performed by: PHYSICIAN ASSISTANT

## 2024-04-22 RX ORDER — DEXTROMETHORPHAN HYDROBROMIDE AND PROMETHAZINE HYDROCHLORIDE 15; 6.25 MG/5ML; MG/5ML
5 SYRUP ORAL 4 TIMES DAILY PRN
Qty: 118 ML | Refills: 0 | Status: SHIPPED | OUTPATIENT
Start: 2024-04-22

## 2024-04-22 NOTE — LETTER
April 22, 2024     Patient: Cory Sheppard   YOB: 1975   Date of Visit: 4/22/2024       To Whom it May Concern:    Cory Sheppard was seen in my clinic on 4/22/2024. He may return to work on 4/23/2024 .    If you have any questions or concerns, please don't hesitate to call.         Sincerely,          Katrina Mauricio PA-C        CC: No Recipients

## 2024-04-22 NOTE — PROGRESS NOTES
[facility]        NAME: Cory Sheppard is a 48 y.o. male  : 1975    MRN: [id number]  DATE: 2024  TIME: 1:41 PM      Assessment and Plan     Acute URI [J06.9]  1. Acute URI  promethazine-dextromethorphan (PHENERGAN-DM) 6.25-15 mg/5 mL oral syrup        Note:   Suspect viral etiology - patient to continue with OTC medications as needed for symptoms and Rx'd cough medicine.     Patient Instructions     Patient Instructions   Upper Respiratory Infection     Over-the-counter medications to help with symptoms   Plain Robitussin or plain Mucinex as directed on the packaging for mucus relief  Sudafed (for those without history of high blood pressure) or Coricidin HBP (for those with history of high blood pressure) for nasal/sinus congestion  Ibuprofen or Acetaminophen for pain, fever, or sore throat   Afrin nasal spray (do not use more than 5 days!)   Saline nasal spray or Flonase nasal spray for nasal congestion  Vitamin C supplements may help shorten duration of colds  Other measures to help with illness   Get plenty of rest   Increase your fluid intake while you feel ill (especially drinks with electrolytes such as Gatorade or Pedialyte)  Follow up with your primary care provider if:  You develop a fever after being fever-free (>100 degrees F)  You have mild chest tightness or mild shortness of breath   Symptoms worsen after initially getting better   Symptoms persist more than 10 days   Go to the emergency room if:   You have moderate to severe chest tightness or shortness of breath   You have any other severe or concerning symptoms        Follow up with PCP in 3-5 days.  Go to ER if symptoms worsen.    Chief Complaint     Chief Complaint   Patient presents with    Cold Like Symptoms     Pt c/o cough ear pain left head congestion and chest congestion that started 3 days ago and has been taking allergy meds         History of Present Illness     Patient presents with sinus congestion,  headaches, nasal congestion, post-nasal drip, and cough x 3 days. He took tylenol cold/sinus with mild relief.         Review of Systems     Review of Systems   Constitutional:  Negative for chills, fatigue and fever.   HENT:  Positive for congestion, postnasal drip and sinus pressure. Negative for ear pain, rhinorrhea, sinus pain, sneezing and sore throat.    Eyes:  Negative for pain and visual disturbance.   Respiratory:  Positive for cough. Negative for shortness of breath.    Cardiovascular:  Negative for chest pain and palpitations.   Gastrointestinal:  Negative for abdominal pain, diarrhea, nausea and vomiting.   Genitourinary:  Negative for dysuria and hematuria.   Musculoskeletal:  Negative for arthralgias, back pain and myalgias.   Skin:  Negative for rash.   Neurological:  Positive for headaches. Negative for dizziness, seizures, syncope and numbness.   All other systems reviewed and are negative.        Current Medications       Current Outpatient Medications:     alclomethasone (ACLOVATE) 0.05 % cream, Apply topically, Disp: , Rfl:     aspirin 81 mg chewable tablet, Chew 1 tablet (81 mg total) daily, Disp: 90 tablet, Rfl: 3    Azelaic Acid 15 % cream, As needed, Disp: , Rfl:     clotrimazole (LOTRIMIN) 1 % cream, APPLY TO RASH ON FEET TWICE DAILY FOR 2-4 WEEKS, Disp: , Rfl:     Dermatological Products, Misc. (ELETONE) CREA, As needed, Disp: , Rfl:     escitalopram (LEXAPRO) 10 mg tablet, , Disp: , Rfl:     famotidine (PEPCID) 20 mg tablet, Take 1 tablet (20 mg total) by mouth 2 (two) times a day, Disp: 30 tablet, Rfl: 0    fluticasone (FLONASE) 50 mcg/act nasal spray, SPRAY 2 SPRAYS INTO EACH NOSTRIL EVERY DAY, Disp: , Rfl:     gabapentin (NEURONTIN) 100 mg capsule, Take 100 mg by mouth 3 (three) times a day  , Disp: , Rfl:     gabapentin (NEURONTIN) 300 mg capsule, , Disp: , Rfl:     Halcinonide 0.1 % CREA, Apply topically, Disp: , Rfl:     loperamide (IMODIUM A-D) 2 MG tablet, Take 2 mg by mouth 4  (four) times a day as needed, Disp: , Rfl:     metroNIDAZOLE (METROGEL) 1 % gel, As needed, Disp: , Rfl:     Multiple Vitamin (MULTIVITAMIN) tablet, Take 1 tablet by mouth daily, Disp: , Rfl:     Naproxen Sodium (ALEVE PO), Take by mouth 2 tabs in the am and 2 in the pm, Disp: , Rfl:     promethazine-dextromethorphan (PHENERGAN-DM) 6.25-15 mg/5 mL oral syrup, Take 5 mL by mouth 4 (four) times a day as needed for cough, Disp: 118 mL, Rfl: 0    Skyrizi Pen 150 MG/ML SOAJ, , Disp: , Rfl:     sucralfate (CARAFATE) 1 g/10 mL suspension, Take 10 mL (1 g total) by mouth 4 (four) times a day, Disp: 414 mL, Rfl: 0    famotidine (PEPCID) 20 mg tablet, Take 1 tablet (20 mg total) by mouth 2 (two) times a day for 5 days, Disp: 10 tablet, Rfl: 0    pantoprazole (PROTONIX) 40 mg tablet, Take 1 tablet (40 mg total) by mouth 2 (two) times a day, Disp: 180 tablet, Rfl: 1    Current Allergies     Allergies as of 04/22/2024 - Reviewed 04/22/2024   Allergen Reaction Noted    Cetirizine Angioedema 05/27/2022    Zyvexol [anti-oxidant] Anaphylaxis and Rash 08/11/2022    Etodolac Rash 10/10/2016    Leflunomide Itching, GI Intolerance, and Swelling 05/27/2022              The following portions of the patient's history were reviewed and updated as appropriate: allergies, current medications, past family history, past medical history, past social history, past surgical history, and problem list.     Past Medical History:   Diagnosis Date    Arthritis     Chest pain     Colon polyp     GERD (gastroesophageal reflux disease)     Hemorrhoids, complicated 03/10/2017    Kidney stone     Kidney stones     Migraine     Psoriasis     Psoriatic arthritis (HCC)        Past Surgical History:   Procedure Laterality Date    COLONOSCOPY      CYSTOSCOPY      HERNIA REPAIR      KIDNEY STONE SURGERY      [place] COLONOSCOPY FLX DX W/COLLJ SPEC WHEN PFRMD N/A 03/10/2017    Procedure: COLONOSCOPY;  Surgeon: Jose Cruz Boo MD;  Location: [facility];  Service:  Colorectal    ULNAR NERVE REPAIR         Family History   Problem Relation Age of Onset    Hypertension Mother     Liver cancer Father          Medications have been verified.        Objective     /78   Pulse 83   Temp (!) 97.3 °F (36.3 °C) (Tympanic)   Resp 19   SpO2 98%   No LMP for male patient.         Physical Exam     Physical Exam  Vitals and nursing note reviewed.   Constitutional:       Appearance: Normal appearance. He is obese.   HENT:      Head: Normocephalic and atraumatic.      Right Ear: Tympanic membrane, ear canal and external ear normal.      Left Ear: Tympanic membrane, ear canal and external ear normal.      Nose: Nose normal.      Mouth/Throat:      Mouth: Mucous membranes are moist.      Pharynx: Oropharynx is clear.   Cardiovascular:      Rate and Rhythm: Normal rate and regular rhythm.      Heart sounds: Normal heart sounds.   Pulmonary:      Effort: Pulmonary effort is normal.      Breath sounds: Normal breath sounds.   Skin:     General: Skin is warm and dry.   Neurological:      General: No focal deficit present.      Mental Status: He is alert and oriented to person, place, and time.   Psychiatric:         Mood and Affect: Mood normal.         Behavior: Behavior normal.

## 2025-02-08 ENCOUNTER — HOSPITAL ENCOUNTER (OUTPATIENT)
Dept: RADIOLOGY | Facility: HOSPITAL | Age: 50
Discharge: HOME | End: 2025-02-08
Payer: COMMERCIAL

## 2025-02-08 DIAGNOSIS — M54.50 LUMBAR PAIN: ICD-10-CM

## 2025-02-08 DIAGNOSIS — M54.2 NECK PAIN: ICD-10-CM

## 2025-02-08 PROCEDURE — 72148 MRI LUMBAR SPINE W/O DYE: CPT

## 2025-02-08 PROCEDURE — 72141 MRI NECK SPINE W/O DYE: CPT

## 2025-02-11 ENCOUNTER — OFFICE VISIT (OUTPATIENT)
Age: 50
End: 2025-02-11
Payer: COMMERCIAL

## 2025-02-11 VITALS — OXYGEN SATURATION: 97 % | DIASTOLIC BLOOD PRESSURE: 82 MMHG | SYSTOLIC BLOOD PRESSURE: 139 MMHG | HEART RATE: 75 BPM

## 2025-02-11 DIAGNOSIS — R10.9 ABDOMINAL BLOATING WITH CRAMPS: ICD-10-CM

## 2025-02-11 DIAGNOSIS — I10 ESSENTIAL HYPERTENSION: ICD-10-CM

## 2025-02-11 DIAGNOSIS — G89.29 CHRONIC LLQ PAIN: Primary | ICD-10-CM

## 2025-02-11 DIAGNOSIS — R10.32 CHRONIC LLQ PAIN: Primary | ICD-10-CM

## 2025-02-11 DIAGNOSIS — R14.0 ABDOMINAL BLOATING WITH CRAMPS: ICD-10-CM

## 2025-02-11 DIAGNOSIS — L40.9 PSORIASIS: ICD-10-CM

## 2025-02-11 PROCEDURE — 99204 OFFICE O/P NEW MOD 45 MIN: CPT | Performed by: COLON & RECTAL SURGERY

## 2025-02-11 NOTE — PROGRESS NOTES
Name: Cory Sheppard      : 1975      MRN: [id number]  Encounter Provider: Jose Cruz Boo MD  Encounter Date: 2025   Encounter department: [facility] [place]  :  Assessment & Plan  Chronic LLQ pain  Possible chronic diverticulitis.  Workup below.  Patient will follow-up in the office in 7 to 10 days for recheck depending upon improvement or nonimprovement plan will be made for diagnostic colonoscopy.  Orders:    CT abdomen pelvis w contrast; Future    CBC and differential; Future    ESR-Brett+CRP; Future    amoxicillin-clavulanate (AUGMENTIN) 875-125 mg per tablet; Take 1 tablet by mouth every 12 (twelve) hours for 20 doses    Abdominal bloating with cramps  The patient complains of abdominal cramps bloating prior to eating.  Patient denies any nausea or vomiting  Orders:    hyoscyamine (LEVSIN/SL) 0.125 mg SL tablet; Take 1 tablet (0.125 mg total) by mouth every 4 (four) hours as needed for cramping for up to 60 doses    Essential hypertension         Psoriasis             History of Present Illness   HPI  Cory Sheppard is a 49 y.o. male who presents for evaluation and treatment of left lower quadrant pain.  The patient states the pain developed approximately 2 months ago.  Patient with previous colonoscopy.  The patient does not recall date of prior colonoscopic evaluation, but there is reference, in his medical history, to colon polyps.  History obtained from: patient    Review of Systems   Constitutional:  Negative for chills and fever.   HENT:  Negative for ear pain and sore throat.    Eyes:  Negative for pain and visual disturbance.   Respiratory:  Negative for cough and shortness of breath.    Cardiovascular:  Negative for chest pain and palpitations.   Gastrointestinal:  Negative for abdominal pain and vomiting.   Genitourinary:  Negative for dysuria and hematuria.   Musculoskeletal:  Negative for arthralgias and back pain.   Skin:  Negative for  color change and rash.   Neurological:  Negative for seizures and syncope.   All other systems reviewed and are negative.    Past Medical History   Past Medical History:   Diagnosis Date    Arthritis     Chest pain     Colon polyp     GERD (gastroesophageal reflux disease)     Hemorrhoids, complicated 03/10/2017    Kidney stone     Kidney stones     Migraine     Psoriasis     Psoriatic arthritis (HCC)      Past Surgical History:   Procedure Laterality Date    COLONOSCOPY      CYSTOSCOPY      HERNIA REPAIR      KIDNEY STONE SURGERY      [facility] COLONOSCOPY FLX DX W/COLLJ SPEC WHEN PFRMD N/A 03/10/2017    Procedure: COLONOSCOPY;  Surgeon: Jose Cruz Boo MD;  Location: [facility];  Service: Colorectal    ULNAR NERVE REPAIR       Family History   Problem Relation Age of Onset    Hypertension Mother     Liver cancer Father       reports that he has never smoked. He has never used smokeless tobacco. He reports that he does not drink alcohol and does not use drugs.  Current Outpatient Medications on File Prior to Visit   Medication Sig Dispense Refill    alclomethasone (ACLOVATE) 0.05 % cream Apply topically      aspirin 81 mg chewable tablet Chew 1 tablet (81 mg total) daily 90 tablet 3    Azelaic Acid 15 % cream As needed      clotrimazole (LOTRIMIN) 1 % cream APPLY TO RASH ON FEET TWICE DAILY FOR 2-4 WEEKS      Dermatological Products, Misc. (ELETONE) CREA As needed      escitalopram (LEXAPRO) 10 mg tablet       famotidine (PEPCID) 20 mg tablet Take 1 tablet (20 mg total) by mouth 2 (two) times a day 30 tablet 0    fluticasone (FLONASE) 50 mcg/act nasal spray SPRAY 2 SPRAYS INTO EACH NOSTRIL EVERY DAY      gabapentin (NEURONTIN) 100 mg capsule Take 100 mg by mouth 3 (three) times a day        gabapentin (NEURONTIN) 300 mg capsule       Halcinonide 0.1 % CREA Apply topically      loperamide (IMODIUM A-D) 2 MG tablet Take 2 mg by mouth 4 (four) times a day as needed      metroNIDAZOLE (METROGEL) 1 % gel As needed       Multiple Vitamin (MULTIVITAMIN) tablet Take 1 tablet by mouth daily      Naproxen Sodium (ALEVE PO) Take by mouth 2 tabs in the am and 2 in the pm      pantoprazole (PROTONIX) 40 mg tablet Take 1 tablet (40 mg total) by mouth 2 (two) times a day 180 tablet 1    promethazine-dextromethorphan (PHENERGAN-DM) 6.25-15 mg/5 mL oral syrup Take 5 mL by mouth 4 (four) times a day as needed for cough 118 mL 0    Skyrizi Pen 150 MG/ML SOAJ       sucralfate (CARAFATE) 1 g/10 mL suspension Take 10 mL (1 g total) by mouth 4 (four) times a day 414 mL 0     No current facility-administered medications on file prior to visit.     Allergies   Allergen Reactions    Cetirizine Angioedema     Hives and tongue swelling and itching    Zyvexol [Anti-Oxidant] Anaphylaxis and Rash    Etodolac Rash     Has tolerated other NSAIDs without reaction    Leflunomide Itching, GI Intolerance and Swelling         Objective   /82   Pulse 75   SpO2 97%      Physical Exam  Vitals and nursing note reviewed.   Constitutional:       General: He is not in acute distress.     Appearance: Normal appearance. He is well-developed. He is obese.   HENT:      Head: Normocephalic and atraumatic.   Eyes:      Conjunctiva/sclera: Conjunctivae normal.   Cardiovascular:      Rate and Rhythm: Normal rate and regular rhythm.      Heart sounds: No murmur heard.  Pulmonary:      Effort: Pulmonary effort is normal. No respiratory distress.      Breath sounds: Normal breath sounds.   Abdominal:      Palpations: Abdomen is soft.      Tenderness: There is abdominal tenderness.   Musculoskeletal:         General: No swelling.      Cervical back: Neck supple.   Skin:     General: Skin is warm and dry.      Capillary Refill: Capillary refill takes less than 2 seconds.   Neurological:      Mental Status: He is alert and oriented to person, place, and time.   Psychiatric:         Mood and Affect: Mood normal.         Behavior: Behavior normal.

## 2025-02-13 ENCOUNTER — TELEPHONE (OUTPATIENT)
Age: 50
End: 2025-02-13

## 2025-02-13 NOTE — TELEPHONE ENCOUNTER
Pt called requesting to speak with someone from Dr. Rivera's office regarding an authorization for cat scan. I don't see an order for CT scan from Dr Boo but ESR AND CBC. Warm transferred over to clinical team for further assistance

## 2025-02-20 ENCOUNTER — HOSPITAL ENCOUNTER (OUTPATIENT)
Dept: CT IMAGING | Facility: CLINIC | Age: 50
Discharge: HOME | End: 2025-02-20
Payer: COMMERCIAL

## 2025-02-20 DIAGNOSIS — G89.29 CHRONIC LLQ PAIN: ICD-10-CM

## 2025-02-20 DIAGNOSIS — R10.32 CHRONIC LLQ PAIN: ICD-10-CM

## 2025-02-20 PROCEDURE — 74177 CT ABD & PELVIS W/CONTRAST: CPT

## 2025-02-20 RX ADMIN — IOHEXOL 75 ML: 350 INJECTION, SOLUTION INTRAVENOUS at 14:04

## 2025-02-25 ENCOUNTER — APPOINTMENT (OUTPATIENT)
Dept: LAB | Facility: HOSPITAL | Age: 50
End: 2025-02-25
Payer: COMMERCIAL

## 2025-02-25 DIAGNOSIS — R10.32 CHRONIC LLQ PAIN: ICD-10-CM

## 2025-02-25 DIAGNOSIS — G89.29 CHRONIC LLQ PAIN: ICD-10-CM

## 2025-02-25 LAB
BASOPHILS # BLD AUTO: 0.09 THOUSANDS/ÂΜL (ref 0–0.1)
BASOPHILS NFR BLD AUTO: 1 % (ref 0–1)
CRP SERPL QL: 2.5 MG/L
EOSINOPHIL # BLD AUTO: 0.17 THOUSAND/ÂΜL (ref 0–0.61)
EOSINOPHIL NFR BLD AUTO: 3 % (ref 0–6)
ERYTHROCYTE [DISTWIDTH] IN BLOOD BY AUTOMATED COUNT: 12.7 % (ref 11.6–15.1)
ERYTHROCYTE [SEDIMENTATION RATE] IN BLOOD: 6 MM/HOUR (ref 0–19)
HCT VFR BLD AUTO: 46 % (ref 36.5–49.3)
HGB BLD-MCNC: 15.5 G/DL (ref 12–17)
IMM GRANULOCYTES # BLD AUTO: 0.02 THOUSAND/UL (ref 0–0.2)
IMM GRANULOCYTES NFR BLD AUTO: 0 % (ref 0–2)
LYMPHOCYTES # BLD AUTO: 1.36 THOUSANDS/ÂΜL (ref 0.6–4.47)
LYMPHOCYTES NFR BLD AUTO: 20 % (ref 14–44)
MCH RBC QN AUTO: 30.1 PG (ref 26.8–34.3)
MCHC RBC AUTO-ENTMCNC: 33.7 G/DL (ref 31.4–37.4)
MCV RBC AUTO: 89 FL (ref 82–98)
MONOCYTES # BLD AUTO: 0.83 THOUSAND/ÂΜL (ref 0.17–1.22)
MONOCYTES NFR BLD AUTO: 12 % (ref 4–12)
NEUTROPHILS # BLD AUTO: 4.25 THOUSANDS/ÂΜL (ref 1.85–7.62)
NEUTS SEG NFR BLD AUTO: 64 % (ref 43–75)
NRBC BLD AUTO-RTO: 0 /100 WBCS
PLATELET # BLD AUTO: 335 THOUSANDS/UL (ref 149–390)
PMV BLD AUTO: 11 FL (ref 8.9–12.7)
RBC # BLD AUTO: 5.15 MILLION/UL (ref 3.88–5.62)
WBC # BLD AUTO: 6.72 THOUSAND/UL (ref 4.31–10.16)

## 2025-02-25 PROCEDURE — 85025 COMPLETE CBC W/AUTO DIFF WBC: CPT

## 2025-02-25 PROCEDURE — 86140 C-REACTIVE PROTEIN: CPT

## 2025-02-25 PROCEDURE — 85652 RBC SED RATE AUTOMATED: CPT

## 2025-02-25 PROCEDURE — 36415 COLL VENOUS BLD VENIPUNCTURE: CPT

## 2025-02-28 ENCOUNTER — OFFICE VISIT (OUTPATIENT)
Age: 50
End: 2025-02-28
Payer: COMMERCIAL

## 2025-02-28 VITALS
BODY MASS INDEX: 32.78 KG/M2 | OXYGEN SATURATION: 97 % | HEART RATE: 84 BPM | SYSTOLIC BLOOD PRESSURE: 124 MMHG | WEIGHT: 242 LBS | HEIGHT: 72 IN | DIASTOLIC BLOOD PRESSURE: 79 MMHG

## 2025-02-28 DIAGNOSIS — R19.7 DIARRHEA, UNSPECIFIED TYPE: Primary | ICD-10-CM

## 2025-02-28 DIAGNOSIS — Z86.0100 HISTORY OF COLON POLYPS: ICD-10-CM

## 2025-02-28 DIAGNOSIS — Z12.11 SCREENING FOR COLON CANCER: Primary | ICD-10-CM

## 2025-02-28 DIAGNOSIS — Z80.0 FAMILY HISTORY OF COLON CANCER IN MOTHER: ICD-10-CM

## 2025-02-28 DIAGNOSIS — R19.4 CHANGE IN BOWEL HABIT: ICD-10-CM

## 2025-02-28 DIAGNOSIS — R19.7 NOCTURNAL DIARRHEA: ICD-10-CM

## 2025-02-28 PROCEDURE — 99214 OFFICE O/P EST MOD 30 MIN: CPT | Performed by: COLON & RECTAL SURGERY

## 2025-02-28 RX ORDER — SODIUM CHLORIDE, SODIUM LACTATE, POTASSIUM CHLORIDE, CALCIUM CHLORIDE 600; 310; 30; 20 MG/100ML; MG/100ML; MG/100ML; MG/100ML
125 INJECTION, SOLUTION INTRAVENOUS CONTINUOUS
OUTPATIENT
Start: 2025-02-28

## 2025-02-28 NOTE — H&P (VIEW-ONLY)
Name: Cory Sheppard      : 1975      MRN: [id number]  Encounter Provider: Jose Cruz Boo MD  Encounter Date: 2025   Encounter department: . [facility] [place]  :  Assessment & Plan  Diarrhea, unspecified type    Orders:    Colonoscopy; Future    Calprotectin,Fecal; Future    Nocturnal diarrhea  Patient's current complaints suggestive of IBD.       Change in bowel habit    Orders:    Colonoscopy; Future    Family history of colon cancer in mother    Orders:    Colonoscopy; Future    History of colon polyps    Orders:    Colonoscopy; Future        History of Present Illness   HPI  Cory Sheppard is a 49 y.o. male who presents with complaints of crampy colicky abdominal discomfort when eating.  Patient CT scan revealed no evidence of diverticulitis or other pathology that would explain the complaints.  Patient also describes some nocturnal diarrhea.  History obtained from: patient    Review of Systems   Constitutional:  Negative for chills and fever.   HENT:  Negative for ear pain and sore throat.    Eyes:  Negative for pain and visual disturbance.   Respiratory:  Negative for cough and shortness of breath.    Cardiovascular:  Negative for chest pain and palpitations.   Gastrointestinal:  Positive for abdominal distention. Negative for abdominal pain and vomiting.   Genitourinary:  Negative for dysuria and hematuria.   Musculoskeletal:  Negative for arthralgias and back pain.   Skin:  Negative for color change and rash.   Neurological:  Negative for seizures and syncope.   All other systems reviewed and are negative.    Past Medical History   Past Medical History:   Diagnosis Date    Arthritis     Chest pain     Colon polyp     GERD (gastroesophageal reflux disease)     Hemorrhoids, complicated 03/10/2017    Kidney stone     Kidney stones     Migraine     Psoriasis     Psoriatic arthritis (HCC)      Past Surgical History:   Procedure Laterality Date     COLONOSCOPY      CYSTOSCOPY      HERNIA REPAIR      KIDNEY STONE SURGERY      [place] COLONOSCOPY FLX DX W/COLLJ SPEC WHEN PFRMD N/A 03/10/2017    Procedure: COLONOSCOPY;  Surgeon: Jose Cruz Boo MD;  Location: [facility];  Service: Colorectal    ULNAR NERVE REPAIR       Family History   Problem Relation Age of Onset    Hypertension Mother     Liver cancer Father       reports that he has never smoked. He has never used smokeless tobacco. He reports that he does not drink alcohol and does not use drugs.  Current Outpatient Medications   Medication Instructions    alclomethasone (ACLOVATE) 0.05 % cream Apply topically    aspirin 81 mg, Oral, Daily    Azelaic Acid 15 % cream As needed    clotrimazole (LOTRIMIN) 1 % cream APPLY TO RASH ON FEET TWICE DAILY FOR 2-4 WEEKS    Dermatological Products, Misc. (ELETONE) CREA As needed    escitalopram (LEXAPRO) 10 mg tablet No dose, route, or frequency recorded.    famotidine (PEPCID) 20 mg, Oral, 2 times daily    fluticasone (FLONASE) 50 mcg/act nasal spray SPRAY 2 SPRAYS INTO EACH NOSTRIL EVERY DAY    gabapentin (NEURONTIN) 300 mg capsule No dose, route, or frequency recorded.    gabapentin (NEURONTIN) 100 mg, 3 times daily    Halcinonide 0.1 % CREA Apply topically    hyoscyamine (LEVSIN/SL) 0.125 mg, Oral, Every 4 hours PRN    loperamide (IMODIUM A-D) 2 mg, 4 times daily PRN    metroNIDAZOLE (METROGEL) 1 % gel As needed    Multiple Vitamin (MULTIVITAMIN) tablet 1 tablet, Daily    Naproxen Sodium (ALEVE PO) Take by mouth 2 tabs in the am and 2 in the pm    pantoprazole (PROTONIX) 40 mg, Oral, 2 times daily    polyethylene glycol (GOLYTELY) 4000 mL solution 4,000 mL, Oral, Once    promethazine-dextromethorphan (PHENERGAN-DM) 6.25-15 mg/5 mL oral syrup 5 mL, Oral, 4 times daily PRN    Skyrizi Pen 150 MG/ML SOAJ No dose, route, or frequency recorded.    sucralfate (CARAFATE) 1 g, Oral, 4 times daily     Allergies   Allergen Reactions    Cetirizine Angioedema     Hives and tongue  swelling and itching    Zyvexol [Anti-Oxidant] Anaphylaxis and Rash    Etodolac Rash     Has tolerated other NSAIDs without reaction    Leflunomide Itching, GI Intolerance and Swelling         Objective   /79   Pulse 84   Ht 6' (1.829 m)   Wt 110 kg (242 lb)   SpO2 97%   BMI 32.82 kg/m²      Physical Exam  Vitals and nursing note reviewed.   Constitutional:       General: He is not in acute distress.     Appearance: He is well-developed.   HENT:      Head: Normocephalic and atraumatic.   Eyes:      Conjunctiva/sclera: Conjunctivae normal.   Cardiovascular:      Rate and Rhythm: Normal rate and regular rhythm.      Heart sounds: No murmur heard.  Pulmonary:      Effort: Pulmonary effort is normal. No respiratory distress.      Breath sounds: Normal breath sounds.   Abdominal:      Palpations: Abdomen is soft.      Tenderness: There is no abdominal tenderness.   Musculoskeletal:         General: No swelling.      Cervical back: Neck supple.   Skin:     General: Skin is warm and dry.      Capillary Refill: Capillary refill takes less than 2 seconds.   Neurological:      Mental Status: He is alert.   Psychiatric:         Mood and Affect: Mood normal.

## 2025-02-28 NOTE — PROGRESS NOTES
Name: Cory Sheppard      : 1975      MRN: [id number]  Encounter Provider: Jose Cruz Boo MD  Encounter Date: 2025   Encounter department: . [facility] [place]  :  Assessment & Plan  Diarrhea, unspecified type    Orders:    Colonoscopy; Future    Calprotectin,Fecal; Future    Nocturnal diarrhea  Patient's current complaints suggestive of IBD.       Change in bowel habit    Orders:    Colonoscopy; Future    Family history of colon cancer in mother    Orders:    Colonoscopy; Future    History of colon polyps    Orders:    Colonoscopy; Future        History of Present Illness   HPI  Cory Sheppard is a 49 y.o. male who presents with complaints of crampy colicky abdominal discomfort when eating.  Patient CT scan revealed no evidence of diverticulitis or other pathology that would explain the complaints.  Patient also describes some nocturnal diarrhea.  History obtained from: patient    Review of Systems   Constitutional:  Negative for chills and fever.   HENT:  Negative for ear pain and sore throat.    Eyes:  Negative for pain and visual disturbance.   Respiratory:  Negative for cough and shortness of breath.    Cardiovascular:  Negative for chest pain and palpitations.   Gastrointestinal:  Positive for abdominal distention. Negative for abdominal pain and vomiting.   Genitourinary:  Negative for dysuria and hematuria.   Musculoskeletal:  Negative for arthralgias and back pain.   Skin:  Negative for color change and rash.   Neurological:  Negative for seizures and syncope.   All other systems reviewed and are negative.    Past Medical History   Past Medical History:   Diagnosis Date    Arthritis     Chest pain     Colon polyp     GERD (gastroesophageal reflux disease)     Hemorrhoids, complicated 03/10/2017    Kidney stone     Kidney stones     Migraine     Psoriasis     Psoriatic arthritis (HCC)      Past Surgical History:   Procedure Laterality Date     COLONOSCOPY      CYSTOSCOPY      HERNIA REPAIR      KIDNEY STONE SURGERY      [place] COLONOSCOPY FLX DX W/COLLJ SPEC WHEN PFRMD N/A 03/10/2017    Procedure: COLONOSCOPY;  Surgeon: Jose Cruz Boo MD;  Location: [facility];  Service: Colorectal    ULNAR NERVE REPAIR       Family History   Problem Relation Age of Onset    Hypertension Mother     Liver cancer Father       reports that he has never smoked. He has never used smokeless tobacco. He reports that he does not drink alcohol and does not use drugs.  Current Outpatient Medications   Medication Instructions    alclomethasone (ACLOVATE) 0.05 % cream Apply topically    aspirin 81 mg, Oral, Daily    Azelaic Acid 15 % cream As needed    clotrimazole (LOTRIMIN) 1 % cream APPLY TO RASH ON FEET TWICE DAILY FOR 2-4 WEEKS    Dermatological Products, Misc. (ELETONE) CREA As needed    escitalopram (LEXAPRO) 10 mg tablet No dose, route, or frequency recorded.    famotidine (PEPCID) 20 mg, Oral, 2 times daily    fluticasone (FLONASE) 50 mcg/act nasal spray SPRAY 2 SPRAYS INTO EACH NOSTRIL EVERY DAY    gabapentin (NEURONTIN) 300 mg capsule No dose, route, or frequency recorded.    gabapentin (NEURONTIN) 100 mg, 3 times daily    Halcinonide 0.1 % CREA Apply topically    hyoscyamine (LEVSIN/SL) 0.125 mg, Oral, Every 4 hours PRN    loperamide (IMODIUM A-D) 2 mg, 4 times daily PRN    metroNIDAZOLE (METROGEL) 1 % gel As needed    Multiple Vitamin (MULTIVITAMIN) tablet 1 tablet, Daily    Naproxen Sodium (ALEVE PO) Take by mouth 2 tabs in the am and 2 in the pm    pantoprazole (PROTONIX) 40 mg, Oral, 2 times daily    polyethylene glycol (GOLYTELY) 4000 mL solution 4,000 mL, Oral, Once    promethazine-dextromethorphan (PHENERGAN-DM) 6.25-15 mg/5 mL oral syrup 5 mL, Oral, 4 times daily PRN    Skyrizi Pen 150 MG/ML SOAJ No dose, route, or frequency recorded.    sucralfate (CARAFATE) 1 g, Oral, 4 times daily     Allergies   Allergen Reactions    Cetirizine Angioedema     Hives and tongue  swelling and itching    Zyvexol [Anti-Oxidant] Anaphylaxis and Rash    Etodolac Rash     Has tolerated other NSAIDs without reaction    Leflunomide Itching, GI Intolerance and Swelling         Objective   /79   Pulse 84   Ht 6' (1.829 m)   Wt 110 kg (242 lb)   SpO2 97%   BMI 32.82 kg/m²      Physical Exam  Vitals and nursing note reviewed.   Constitutional:       General: He is not in acute distress.     Appearance: He is well-developed.   HENT:      Head: Normocephalic and atraumatic.   Eyes:      Conjunctiva/sclera: Conjunctivae normal.   Cardiovascular:      Rate and Rhythm: Normal rate and regular rhythm.      Heart sounds: No murmur heard.  Pulmonary:      Effort: Pulmonary effort is normal. No respiratory distress.      Breath sounds: Normal breath sounds.   Abdominal:      Palpations: Abdomen is soft.      Tenderness: There is no abdominal tenderness.   Musculoskeletal:         General: No swelling.      Cervical back: Neck supple.   Skin:     General: Skin is warm and dry.      Capillary Refill: Capillary refill takes less than 2 seconds.   Neurological:      Mental Status: He is alert.   Psychiatric:         Mood and Affect: Mood normal.

## 2025-02-28 NOTE — PATIENT INSTRUCTIONS
Scheduled date of colonoscopy (as of today): 3/21/25  Physician performing colonoscopy: Rajeev  Location of colonoscopy: [facility]  Bowel prep reviewed with patient: Golytely  Instructions reviewed with patient by: Zamzam GALLARDO  Clearances:

## 2025-03-21 ENCOUNTER — ANESTHESIA EVENT (OUTPATIENT)
Dept: GASTROENTEROLOGY | Facility: HOSPITAL | Age: 50
End: 2025-03-21
Payer: COMMERCIAL

## 2025-03-21 ENCOUNTER — HOSPITAL ENCOUNTER (OUTPATIENT)
Dept: GASTROENTEROLOGY | Facility: HOSPITAL | Age: 50
Setting detail: OUTPATIENT SURGERY
Discharge: HOME | End: 2025-03-21
Attending: COLON & RECTAL SURGERY
Payer: COMMERCIAL

## 2025-03-21 ENCOUNTER — ANESTHESIA (OUTPATIENT)
Dept: GASTROENTEROLOGY | Facility: HOSPITAL | Age: 50
End: 2025-03-21
Payer: COMMERCIAL

## 2025-03-21 VITALS
TEMPERATURE: 97.5 F | HEIGHT: 72 IN | WEIGHT: 232.59 LBS | RESPIRATION RATE: 16 BRPM | HEART RATE: 76 BPM | BODY MASS INDEX: 31.5 KG/M2 | SYSTOLIC BLOOD PRESSURE: 99 MMHG | OXYGEN SATURATION: 97 % | DIASTOLIC BLOOD PRESSURE: 69 MMHG

## 2025-03-21 DIAGNOSIS — R19.4 CHANGE IN BOWEL HABIT: ICD-10-CM

## 2025-03-21 DIAGNOSIS — R19.7 DIARRHEA, UNSPECIFIED TYPE: ICD-10-CM

## 2025-03-21 DIAGNOSIS — Z80.0 FAMILY HISTORY OF COLON CANCER IN MOTHER: ICD-10-CM

## 2025-03-21 DIAGNOSIS — Z86.0100 HISTORY OF COLON POLYPS: ICD-10-CM

## 2025-03-21 PROBLEM — F41.9 ANXIETY: Status: ACTIVE | Noted: 2018-08-29

## 2025-03-21 PROBLEM — K29.60 EROSIVE GASTRITIS: Status: ACTIVE | Noted: 2024-08-26

## 2025-03-21 PROBLEM — Z79.899 MEDICAL MARIJUANA USE: Status: ACTIVE | Noted: 2019-06-05

## 2025-03-21 PROBLEM — M06.4 INFLAMMATORY POLYARTHRITIS (HCC): Status: ACTIVE | Noted: 2025-02-28

## 2025-03-21 PROBLEM — L40.50 PSORIATIC ARTHRITIS (HCC): Chronic | Status: ACTIVE | Noted: 2025-03-21

## 2025-03-21 PROBLEM — K58.0 IRRITABLE BOWEL SYNDROME WITH DIARRHEA: Status: ACTIVE | Noted: 2018-08-29

## 2025-03-21 PROBLEM — G60.9 IDIOPATHIC PERIPHERAL NEUROPATHY: Status: ACTIVE | Noted: 2025-03-21

## 2025-03-21 PROBLEM — S14.3XXA INJURY OF BRACHIAL PLEXUS: Status: ACTIVE | Noted: 2018-05-17

## 2025-03-21 PROBLEM — G47.33 OSA (OBSTRUCTIVE SLEEP APNEA): Status: ACTIVE | Noted: 2024-06-17

## 2025-03-21 PROBLEM — R73.03 PREDIABETES: Status: ACTIVE | Noted: 2024-02-12

## 2025-03-21 PROBLEM — E66.811 CLASS 1 OBESITY WITH BODY MASS INDEX (BMI) OF 31.0 TO 31.9 IN ADULT: Status: ACTIVE | Noted: 2025-03-21

## 2025-03-21 PROCEDURE — 88305 TISSUE EXAM BY PATHOLOGIST: CPT | Performed by: STUDENT IN AN ORGANIZED HEALTH CARE EDUCATION/TRAINING PROGRAM

## 2025-03-21 PROCEDURE — 45380 COLONOSCOPY AND BIOPSY: CPT | Performed by: COLON & RECTAL SURGERY

## 2025-03-21 RX ORDER — PROPOFOL 10 MG/ML
INJECTION, EMULSION INTRAVENOUS AS NEEDED
Status: DISCONTINUED | OUTPATIENT
Start: 2025-03-21 | End: 2025-03-21

## 2025-03-21 RX ADMIN — PROPOFOL 50 MG: 10 INJECTION, EMULSION INTRAVENOUS at 09:41

## 2025-03-21 RX ADMIN — PROPOFOL 50 MG: 10 INJECTION, EMULSION INTRAVENOUS at 09:46

## 2025-03-21 RX ADMIN — PROPOFOL 100 MG: 10 INJECTION, EMULSION INTRAVENOUS at 09:31

## 2025-03-21 RX ADMIN — PROPOFOL 50 MG: 10 INJECTION, EMULSION INTRAVENOUS at 09:38

## 2025-03-21 RX ADMIN — PROPOFOL 50 MG: 10 INJECTION, EMULSION INTRAVENOUS at 09:51

## 2025-03-21 RX ADMIN — PROPOFOL 50 MG: 10 INJECTION, EMULSION INTRAVENOUS at 09:32

## 2025-03-21 RX ADMIN — PROPOFOL 50 MG: 10 INJECTION, EMULSION INTRAVENOUS at 09:35

## 2025-03-21 NOTE — ANESTHESIA POSTPROCEDURE EVALUATION
Post-Op Assessment Note    CV Status:  Stable    Pain management: adequate       Mental Status:  Alert and awake   Hydration Status:  Euvolemic   PONV Controlled:  Controlled   Airway Patency:  Patent     Post Op Vitals Reviewed: Yes    No anethesia notable event occurred.    Staff: CRNA, Anesthesiologist           Last Filed PACU Vitals:  Vitals Value Taken Time   Temp 97.5 °F (36.4 °C) 03/21/25 0954   Pulse 77 03/21/25 0954   /60 03/21/25 0954   Resp 16 03/21/25 0954   SpO2 93 % 03/21/25 0954       Modified Yolie:     Vitals Value Taken Time   Activity 2 03/21/25 0954   Respiration 2 03/21/25 0954   Circulation 2 03/21/25 0954   Consciousness 1 03/21/25 0954   Oxygen Saturation 2 03/21/25 0954     Modified Yolie Score: 9

## 2025-03-21 NOTE — INTERVAL H&P NOTE
H&P reviewed. After examining the patient I find no changes in the patients condition since the H&P had been written.    Vitals:    03/21/25 0841   BP: 142/83   Pulse: 76   Resp: 16   Temp: 97.8 °F (36.6 °C)   SpO2: 98%

## 2025-03-21 NOTE — ANESTHESIA PREPROCEDURE EVALUATION
"Procedure:  COLONOSCOPY    Denies CP and SOB with exertion  Moderate SHAUN, CPAP set 8-15    Relevant Problems   CARDIO   (+) Chest pain   (+) Essential hypertension   (+) Hemorrhoids, complicated      GI/HEPATIC   (+) Erosive gastritis      MUSCULOSKELETAL   (+) Inflammatory polyarthritis (HCC)   (+) Psoriatic arthritis (HCC)      NEURO/PSYCH   (+) Anxiety      PULMONARY   (+) SHAUN (obstructive sleep apnea)      Behavioral Health   (+) Medical marijuana use      Neurology/Sleep   (+) Idiopathic peripheral neuropathy   (+) Injury of brachial plexus      FEN/Gastrointestinal   (+) Irritable bowel syndrome with diarrhea      Other   (+) Class 1 obesity with body mass index (BMI) of 31.0 to 31.9 in adult   (+) Prediabetes        Physical Exam    Airway    Mallampati score: II  TM Distance: >3 FB  Neck ROM: full     Dental       Cardiovascular      Pulmonary      Other Findings        Anesthesia Plan  ASA Score- 3     Anesthesia Type- IV sedation with anesthesia with ASA Monitors.         Additional Monitors:     Airway Plan:     Comment: Recent labs personally reviewed:  Lab Results       Component                Value               Date                       WBC                      6.72                02/25/2025                 HGB                      15.5                02/25/2025                 PLT                      335                 02/25/2025            Lab Results       Component                Value               Date                       K                        4.6                 02/22/2025                 BUN                      11                  02/22/2025                 CREATININE               0.8                 02/22/2025            No results found for: \"PTT\"   No results found for: \"INR\"    Blood type     Patient was consented for sedation with IV anesthetic. Discussed that we will maintain spontaneous respirations and utilize supplemental O2. I discussed the risks of aspiration, hypoxia, " laryngospasm and bronchospasm. I discussed the scenarios related to conversion to general anesthetic. All questions answered.     I, Ana Huerta MD, have personally seen and evaluated the patient prior to anesthetic care.  I have reviewed the pre-anesthetic record, medical history, allergies, medications and any other medical records if appropriate to the anesthetic care.  If a CRNA is involved in the case, I have reviewed the CRNA assessment, if present, and agree. Patient consented for IV Sedation, general anesthesia as back up. Discussed risks of aspiration, IV infiltration, indications for conversion to general anesthesia. All questions and concerns addressed.   .       Plan Factors-Exercise tolerance (METS): >4 METS.    Chart reviewed.   Existing labs reviewed. Patient summary reviewed.    Patient is not a current smoker.  Patient did not smoke on day of surgery.    Obstructive sleep apnea risk education given perioperatively.        Induction- intravenous.    Postoperative Plan-         Informed Consent- Anesthetic plan and risks discussed with patient.  I personally reviewed this patient with the CRNA. Discussed and agreed on the Anesthesia Plan with the CRNA..      NPO Status:  Vitals Value Taken Time   Date of last liquid 03/20/25 03/21/25 0843   Time of last liquid 2200 03/21/25 0843   Date of last solid 03/19/25 03/21/25 0843   Time of last solid 1800 03/21/25 0843

## 2025-03-28 PROCEDURE — 88305 TISSUE EXAM BY PATHOLOGIST: CPT | Performed by: STUDENT IN AN ORGANIZED HEALTH CARE EDUCATION/TRAINING PROGRAM

## 2025-04-07 ENCOUNTER — HOSPITAL ENCOUNTER (INPATIENT)
Facility: HOSPITAL | Age: 50
LOS: 1 days | Discharge: HOME | DRG: 872 | End: 2025-04-11
Attending: EMERGENCY MEDICINE
Payer: COMMERCIAL

## 2025-04-07 ENCOUNTER — APPOINTMENT (EMERGENCY)
Dept: CT IMAGING | Facility: HOSPITAL | Age: 50
DRG: 872 | End: 2025-04-07
Payer: COMMERCIAL

## 2025-04-07 DIAGNOSIS — R11.2 NAUSEA AND VOMITING: ICD-10-CM

## 2025-04-07 DIAGNOSIS — K58.0 IRRITABLE BOWEL SYNDROME WITH DIARRHEA: ICD-10-CM

## 2025-04-07 DIAGNOSIS — R10.9 ABDOMINAL PAIN: ICD-10-CM

## 2025-04-07 DIAGNOSIS — K29.60 EROSIVE GASTRITIS: ICD-10-CM

## 2025-04-07 DIAGNOSIS — R19.7 DIARRHEA: ICD-10-CM

## 2025-04-07 DIAGNOSIS — R17 ELEVATED BILIRUBIN: ICD-10-CM

## 2025-04-07 DIAGNOSIS — K52.9 ENTERITIS: Primary | ICD-10-CM

## 2025-04-07 LAB
ALBUMIN SERPL BCG-MCNC: 4.8 G/DL (ref 3.5–5)
ALP SERPL-CCNC: 64 U/L (ref 34–104)
ALT SERPL W P-5'-P-CCNC: 46 U/L (ref 7–52)
ANION GAP SERPL CALCULATED.3IONS-SCNC: 11 MMOL/L (ref 4–13)
AST SERPL W P-5'-P-CCNC: 33 U/L (ref 13–39)
BASOPHILS # BLD AUTO: 0.08 THOUSANDS/ÂΜL (ref 0–0.1)
BASOPHILS NFR BLD AUTO: 1 % (ref 0–1)
BILIRUB SERPL-MCNC: 1.85 MG/DL (ref 0.2–1)
BUN SERPL-MCNC: 20 MG/DL (ref 5–25)
CALCIUM SERPL-MCNC: 8.9 MG/DL (ref 8.4–10.2)
CHLORIDE SERPL-SCNC: 105 MMOL/L (ref 96–108)
CO2 SERPL-SCNC: 21 MMOL/L (ref 21–32)
CREAT SERPL-MCNC: 0.84 MG/DL (ref 0.6–1.3)
EOSINOPHIL # BLD AUTO: 0.08 THOUSAND/ÂΜL (ref 0–0.61)
EOSINOPHIL NFR BLD AUTO: 1 % (ref 0–6)
ERYTHROCYTE [DISTWIDTH] IN BLOOD BY AUTOMATED COUNT: 12.9 % (ref 11.6–15.1)
GFR SERPL CREATININE-BSD FRML MDRD: 102 ML/MIN/1.73SQ M
GLUCOSE SERPL-MCNC: 139 MG/DL (ref 65–140)
HCT VFR BLD AUTO: 50.9 % (ref 36.5–49.3)
HGB BLD-MCNC: 17.4 G/DL (ref 12–17)
IMM GRANULOCYTES # BLD AUTO: 0.09 THOUSAND/UL (ref 0–0.2)
IMM GRANULOCYTES NFR BLD AUTO: 1 % (ref 0–2)
LACTATE SERPL-SCNC: 2 MMOL/L (ref 0.5–2)
LIPASE SERPL-CCNC: 51 U/L (ref 11–82)
LYMPHOCYTES # BLD AUTO: 0.38 THOUSANDS/ÂΜL (ref 0.6–4.47)
LYMPHOCYTES NFR BLD AUTO: 2 % (ref 14–44)
MCH RBC QN AUTO: 30.1 PG (ref 26.8–34.3)
MCHC RBC AUTO-ENTMCNC: 34.2 G/DL (ref 31.4–37.4)
MCV RBC AUTO: 88 FL (ref 82–98)
MONOCYTES # BLD AUTO: 0.88 THOUSAND/ÂΜL (ref 0.17–1.22)
MONOCYTES NFR BLD AUTO: 5 % (ref 4–12)
NEUTROPHILS # BLD AUTO: 16.15 THOUSANDS/ÂΜL (ref 1.85–7.62)
NEUTS SEG NFR BLD AUTO: 90 % (ref 43–75)
NRBC BLD AUTO-RTO: 0 /100 WBCS
PLATELET # BLD AUTO: 296 THOUSANDS/UL (ref 149–390)
PMV BLD AUTO: 11.1 FL (ref 8.9–12.7)
POTASSIUM SERPL-SCNC: 3.7 MMOL/L (ref 3.5–5.3)
PROT SERPL-MCNC: 7.6 G/DL (ref 6.4–8.4)
RBC # BLD AUTO: 5.79 MILLION/UL (ref 3.88–5.62)
SODIUM SERPL-SCNC: 137 MMOL/L (ref 135–147)
WBC # BLD AUTO: 17.66 THOUSAND/UL (ref 4.31–10.16)

## 2025-04-07 PROCEDURE — 96361 HYDRATE IV INFUSION ADD-ON: CPT

## 2025-04-07 PROCEDURE — 74177 CT ABD & PELVIS W/CONTRAST: CPT

## 2025-04-07 PROCEDURE — 83605 ASSAY OF LACTIC ACID: CPT

## 2025-04-07 PROCEDURE — 99284 EMERGENCY DEPT VISIT MOD MDM: CPT

## 2025-04-07 PROCEDURE — 83690 ASSAY OF LIPASE: CPT

## 2025-04-07 PROCEDURE — 99285 EMERGENCY DEPT VISIT HI MDM: CPT

## 2025-04-07 PROCEDURE — 36415 COLL VENOUS BLD VENIPUNCTURE: CPT

## 2025-04-07 PROCEDURE — 81003 URINALYSIS AUTO W/O SCOPE: CPT

## 2025-04-07 PROCEDURE — 96374 THER/PROPH/DIAG INJ IV PUSH: CPT

## 2025-04-07 PROCEDURE — 85025 COMPLETE CBC W/AUTO DIFF WBC: CPT

## 2025-04-07 PROCEDURE — 96375 TX/PRO/DX INJ NEW DRUG ADDON: CPT

## 2025-04-07 PROCEDURE — 80053 COMPREHEN METABOLIC PANEL: CPT

## 2025-04-07 RX ORDER — FAMOTIDINE 10 MG/ML
20 INJECTION, SOLUTION INTRAVENOUS ONCE
Status: COMPLETED | OUTPATIENT
Start: 2025-04-07 | End: 2025-04-07

## 2025-04-07 RX ORDER — ONDANSETRON 2 MG/ML
4 INJECTION INTRAMUSCULAR; INTRAVENOUS ONCE
Status: COMPLETED | OUTPATIENT
Start: 2025-04-07 | End: 2025-04-07

## 2025-04-07 RX ORDER — PANTOPRAZOLE SODIUM 40 MG/10ML
40 INJECTION, POWDER, LYOPHILIZED, FOR SOLUTION INTRAVENOUS ONCE
Status: COMPLETED | OUTPATIENT
Start: 2025-04-07 | End: 2025-04-07

## 2025-04-07 RX ORDER — FENTANYL CITRATE 50 UG/ML
50 INJECTION, SOLUTION INTRAMUSCULAR; INTRAVENOUS ONCE
Refills: 0 | Status: COMPLETED | OUTPATIENT
Start: 2025-04-07 | End: 2025-04-07

## 2025-04-07 RX ADMIN — SODIUM CHLORIDE 1000 ML: 0.9 INJECTION, SOLUTION INTRAVENOUS at 22:29

## 2025-04-07 RX ADMIN — FENTANYL CITRATE 50 MCG: 50 INJECTION INTRAMUSCULAR; INTRAVENOUS at 22:24

## 2025-04-07 RX ADMIN — IOHEXOL 100 ML: 350 INJECTION, SOLUTION INTRAVENOUS at 23:15

## 2025-04-07 RX ADMIN — ONDANSETRON 4 MG: 2 INJECTION INTRAMUSCULAR; INTRAVENOUS at 22:23

## 2025-04-07 RX ADMIN — PANTOPRAZOLE SODIUM 40 MG: 40 INJECTION, POWDER, FOR SOLUTION INTRAVENOUS at 22:26

## 2025-04-07 RX ADMIN — FAMOTIDINE 20 MG: 10 INJECTION, SOLUTION INTRAVENOUS at 22:32

## 2025-04-08 PROBLEM — R17 ELEVATED BILIRUBIN: Status: ACTIVE | Noted: 2025-04-08

## 2025-04-08 PROBLEM — R11.2 NAUSEA AND VOMITING: Status: ACTIVE | Noted: 2025-04-08

## 2025-04-08 PROBLEM — K52.9 ENTERITIS: Status: ACTIVE | Noted: 2025-04-08

## 2025-04-08 PROBLEM — D72.829 LEUKOCYTOSIS: Status: ACTIVE | Noted: 2025-04-08

## 2025-04-08 PROBLEM — A41.9 SEPSIS (HCC): Status: ACTIVE | Noted: 2025-04-08

## 2025-04-08 PROBLEM — E86.0 DEHYDRATION: Status: ACTIVE | Noted: 2025-04-08

## 2025-04-08 LAB
ALBUMIN SERPL BCG-MCNC: 3.9 G/DL (ref 3.5–5)
ALP SERPL-CCNC: 50 U/L (ref 34–104)
ALT SERPL W P-5'-P-CCNC: 39 U/L (ref 7–52)
ANION GAP SERPL CALCULATED.3IONS-SCNC: 7 MMOL/L (ref 4–13)
AST SERPL W P-5'-P-CCNC: 29 U/L (ref 13–39)
BASOPHILS # BLD AUTO: 0.06 THOUSANDS/ÂΜL (ref 0–0.1)
BASOPHILS NFR BLD AUTO: 1 % (ref 0–1)
BILIRUB SERPL-MCNC: 1.89 MG/DL (ref 0.2–1)
BILIRUB UR QL STRIP: NEGATIVE
BUN SERPL-MCNC: 18 MG/DL (ref 5–25)
CALCIUM SERPL-MCNC: 7.8 MG/DL (ref 8.4–10.2)
CHLORIDE SERPL-SCNC: 108 MMOL/L (ref 96–108)
CLARITY UR: CLEAR
CO2 SERPL-SCNC: 23 MMOL/L (ref 21–32)
COLOR UR: ABNORMAL
CREAT SERPL-MCNC: 0.85 MG/DL (ref 0.6–1.3)
EOSINOPHIL # BLD AUTO: 0.04 THOUSAND/ÂΜL (ref 0–0.61)
EOSINOPHIL NFR BLD AUTO: 0 % (ref 0–6)
ERYTHROCYTE [DISTWIDTH] IN BLOOD BY AUTOMATED COUNT: 13 % (ref 11.6–15.1)
GFR SERPL CREATININE-BSD FRML MDRD: 101 ML/MIN/1.73SQ M
GLUCOSE SERPL-MCNC: 116 MG/DL (ref 65–140)
GLUCOSE UR STRIP-MCNC: NEGATIVE MG/DL
HCT VFR BLD AUTO: 45.9 % (ref 36.5–49.3)
HGB BLD-MCNC: 15.5 G/DL (ref 12–17)
HGB UR QL STRIP.AUTO: NEGATIVE
IMM GRANULOCYTES # BLD AUTO: 0.04 THOUSAND/UL (ref 0–0.2)
IMM GRANULOCYTES NFR BLD AUTO: 0 % (ref 0–2)
KETONES UR STRIP-MCNC: ABNORMAL MG/DL
LEUKOCYTE ESTERASE UR QL STRIP: NEGATIVE
LYMPHOCYTES # BLD AUTO: 0.35 THOUSANDS/ÂΜL (ref 0.6–4.47)
LYMPHOCYTES NFR BLD AUTO: 3 % (ref 14–44)
MCH RBC QN AUTO: 30.3 PG (ref 26.8–34.3)
MCHC RBC AUTO-ENTMCNC: 33.8 G/DL (ref 31.4–37.4)
MCV RBC AUTO: 90 FL (ref 82–98)
MONOCYTES # BLD AUTO: 0.6 THOUSAND/ÂΜL (ref 0.17–1.22)
MONOCYTES NFR BLD AUTO: 6 % (ref 4–12)
NEUTROPHILS # BLD AUTO: 9.8 THOUSANDS/ÂΜL (ref 1.85–7.62)
NEUTS SEG NFR BLD AUTO: 90 % (ref 43–75)
NITRITE UR QL STRIP: NEGATIVE
NRBC BLD AUTO-RTO: 0 /100 WBCS
PH UR STRIP.AUTO: 6.5 [PH]
PLATELET # BLD AUTO: 270 THOUSANDS/UL (ref 149–390)
PMV BLD AUTO: 11.2 FL (ref 8.9–12.7)
POTASSIUM SERPL-SCNC: 3.7 MMOL/L (ref 3.5–5.3)
PROT SERPL-MCNC: 6.1 G/DL (ref 6.4–8.4)
PROT UR STRIP-MCNC: NEGATIVE MG/DL
RBC # BLD AUTO: 5.11 MILLION/UL (ref 3.88–5.62)
SODIUM SERPL-SCNC: 138 MMOL/L (ref 135–147)
SP GR UR STRIP.AUTO: >=1.05 (ref 1–1.03)
UROBILINOGEN UR STRIP-ACNC: <2 MG/DL
WBC # BLD AUTO: 10.89 THOUSAND/UL (ref 4.31–10.16)

## 2025-04-08 PROCEDURE — 96376 TX/PRO/DX INJ SAME DRUG ADON: CPT

## 2025-04-08 PROCEDURE — 80053 COMPREHEN METABOLIC PANEL: CPT | Performed by: STUDENT IN AN ORGANIZED HEALTH CARE EDUCATION/TRAINING PROGRAM

## 2025-04-08 PROCEDURE — 87505 NFCT AGENT DETECTION GI: CPT | Performed by: STUDENT IN AN ORGANIZED HEALTH CARE EDUCATION/TRAINING PROGRAM

## 2025-04-08 PROCEDURE — 96375 TX/PRO/DX INJ NEW DRUG ADDON: CPT

## 2025-04-08 PROCEDURE — 36415 COLL VENOUS BLD VENIPUNCTURE: CPT | Performed by: STUDENT IN AN ORGANIZED HEALTH CARE EDUCATION/TRAINING PROGRAM

## 2025-04-08 PROCEDURE — 87040 BLOOD CULTURE FOR BACTERIA: CPT | Performed by: STUDENT IN AN ORGANIZED HEALTH CARE EDUCATION/TRAINING PROGRAM

## 2025-04-08 PROCEDURE — 85025 COMPLETE CBC W/AUTO DIFF WBC: CPT | Performed by: STUDENT IN AN ORGANIZED HEALTH CARE EDUCATION/TRAINING PROGRAM

## 2025-04-08 PROCEDURE — 99223 1ST HOSP IP/OBS HIGH 75: CPT | Performed by: STUDENT IN AN ORGANIZED HEALTH CARE EDUCATION/TRAINING PROGRAM

## 2025-04-08 RX ORDER — SODIUM CHLORIDE 9 MG/ML
125 INJECTION, SOLUTION INTRAVENOUS CONTINUOUS
Status: DISCONTINUED | OUTPATIENT
Start: 2025-04-08 | End: 2025-04-09

## 2025-04-08 RX ORDER — ENOXAPARIN SODIUM 100 MG/ML
40 INJECTION SUBCUTANEOUS DAILY
Status: DISCONTINUED | OUTPATIENT
Start: 2025-04-08 | End: 2025-04-11 | Stop reason: HOSPADM

## 2025-04-08 RX ORDER — PROMETHAZINE HYDROCHLORIDE 25 MG/ML
25 INJECTION, SOLUTION INTRAMUSCULAR; INTRAVENOUS EVERY 6 HOURS PRN
Status: DISCONTINUED | OUTPATIENT
Start: 2025-04-08 | End: 2025-04-11 | Stop reason: HOSPADM

## 2025-04-08 RX ORDER — HYDROMORPHONE HCL/PF 1 MG/ML
1 SYRINGE (ML) INJECTION EVERY 4 HOURS PRN
Status: DISCONTINUED | OUTPATIENT
Start: 2025-04-08 | End: 2025-04-11 | Stop reason: HOSPADM

## 2025-04-08 RX ORDER — LOPERAMIDE HYDROCHLORIDE 2 MG/1
2 CAPSULE ORAL 3 TIMES DAILY PRN
Status: DISCONTINUED | OUTPATIENT
Start: 2025-04-08 | End: 2025-04-11 | Stop reason: HOSPADM

## 2025-04-08 RX ORDER — FENTANYL CITRATE 50 UG/ML
50 INJECTION, SOLUTION INTRAMUSCULAR; INTRAVENOUS ONCE
Refills: 0 | Status: COMPLETED | OUTPATIENT
Start: 2025-04-08 | End: 2025-04-08

## 2025-04-08 RX ORDER — DIPHENHYDRAMINE HYDROCHLORIDE 50 MG/ML
12.5 INJECTION, SOLUTION INTRAMUSCULAR; INTRAVENOUS ONCE
Status: COMPLETED | OUTPATIENT
Start: 2025-04-08 | End: 2025-04-08

## 2025-04-08 RX ORDER — GABAPENTIN 400 MG/1
400 CAPSULE ORAL 3 TIMES DAILY
Status: DISCONTINUED | OUTPATIENT
Start: 2025-04-08 | End: 2025-04-11 | Stop reason: HOSPADM

## 2025-04-08 RX ORDER — FAMOTIDINE 20 MG/1
10 TABLET, FILM COATED ORAL 2 TIMES DAILY
Status: DISCONTINUED | OUTPATIENT
Start: 2025-04-08 | End: 2025-04-11 | Stop reason: HOSPADM

## 2025-04-08 RX ORDER — ACETAMINOPHEN 10 MG/ML
1000 INJECTION, SOLUTION INTRAVENOUS EVERY 6 HOURS PRN
Status: DISPENSED | OUTPATIENT
Start: 2025-04-08 | End: 2025-04-10

## 2025-04-08 RX ORDER — ESCITALOPRAM OXALATE 10 MG/1
10 TABLET ORAL DAILY
Status: DISCONTINUED | OUTPATIENT
Start: 2025-04-08 | End: 2025-04-11 | Stop reason: HOSPADM

## 2025-04-08 RX ORDER — METOCLOPRAMIDE HYDROCHLORIDE 5 MG/ML
10 INJECTION INTRAMUSCULAR; INTRAVENOUS ONCE
Status: DISCONTINUED | OUTPATIENT
Start: 2025-04-08 | End: 2025-04-08

## 2025-04-08 RX ORDER — PANTOPRAZOLE SODIUM 40 MG/10ML
40 INJECTION, POWDER, LYOPHILIZED, FOR SOLUTION INTRAVENOUS
Status: DISCONTINUED | OUTPATIENT
Start: 2025-04-08 | End: 2025-04-11 | Stop reason: HOSPADM

## 2025-04-08 RX ADMIN — FAMOTIDINE 10 MG: 20 TABLET, FILM COATED ORAL at 17:00

## 2025-04-08 RX ADMIN — GABAPENTIN 400 MG: 400 CAPSULE ORAL at 17:01

## 2025-04-08 RX ADMIN — HYDROMORPHONE HYDROCHLORIDE 1 MG: 1 INJECTION, SOLUTION INTRAMUSCULAR; INTRAVENOUS; SUBCUTANEOUS at 10:20

## 2025-04-08 RX ADMIN — SODIUM CHLORIDE 125 ML/HR: 0.9 INJECTION, SOLUTION INTRAVENOUS at 22:51

## 2025-04-08 RX ADMIN — ESCITALOPRAM OXALATE 10 MG: 10 TABLET ORAL at 08:30

## 2025-04-08 RX ADMIN — ACETAMINOPHEN 1000 MG: 10 INJECTION INTRAVENOUS at 22:49

## 2025-04-08 RX ADMIN — DIPHENHYDRAMINE HYDROCHLORIDE 12.5 MG: 50 INJECTION, SOLUTION INTRAMUSCULAR; INTRAVENOUS at 01:02

## 2025-04-08 RX ADMIN — HYDROMORPHONE HYDROCHLORIDE 1 MG: 1 INJECTION, SOLUTION INTRAMUSCULAR; INTRAVENOUS; SUBCUTANEOUS at 20:28

## 2025-04-08 RX ADMIN — GABAPENTIN 400 MG: 400 CAPSULE ORAL at 20:29

## 2025-04-08 RX ADMIN — PANTOPRAZOLE SODIUM 40 MG: 40 INJECTION, POWDER, FOR SOLUTION INTRAVENOUS at 03:43

## 2025-04-08 RX ADMIN — PIPERACILLIN AND TAZOBACTAM 4.5 G: 36; 4.5 INJECTION, POWDER, FOR SOLUTION INTRAVENOUS at 02:18

## 2025-04-08 RX ADMIN — PROMETHAZINE HYDROCHLORIDE 25 MG: 25 INJECTION INTRAMUSCULAR; INTRAVENOUS at 17:01

## 2025-04-08 RX ADMIN — PIPERACILLIN AND TAZOBACTAM 4.5 G: 36; 4.5 INJECTION, POWDER, FOR SOLUTION INTRAVENOUS at 06:07

## 2025-04-08 RX ADMIN — FAMOTIDINE 10 MG: 20 TABLET, FILM COATED ORAL at 08:30

## 2025-04-08 RX ADMIN — PROMETHAZINE HYDROCHLORIDE 25 MG: 25 INJECTION INTRAMUSCULAR; INTRAVENOUS at 03:38

## 2025-04-08 RX ADMIN — GABAPENTIN 400 MG: 400 CAPSULE ORAL at 08:30

## 2025-04-08 RX ADMIN — HYDROMORPHONE HYDROCHLORIDE 1 MG: 1 INJECTION, SOLUTION INTRAMUSCULAR; INTRAVENOUS; SUBCUTANEOUS at 15:54

## 2025-04-08 RX ADMIN — SODIUM CHLORIDE 125 ML/HR: 0.9 INJECTION, SOLUTION INTRAVENOUS at 03:33

## 2025-04-08 RX ADMIN — HYDROMORPHONE HYDROCHLORIDE 1 MG: 1 INJECTION, SOLUTION INTRAMUSCULAR; INTRAVENOUS; SUBCUTANEOUS at 03:37

## 2025-04-08 RX ADMIN — ENOXAPARIN SODIUM 40 MG: 40 INJECTION SUBCUTANEOUS at 08:30

## 2025-04-08 RX ADMIN — METOCLOPRAMIDE HYDROCHLORIDE 10 MG: 5 INJECTION INTRAMUSCULAR; INTRAVENOUS at 01:03

## 2025-04-08 RX ADMIN — FENTANYL CITRATE 50 MCG: 50 INJECTION INTRAMUSCULAR; INTRAVENOUS at 00:24

## 2025-04-08 RX ADMIN — PIPERACILLIN AND TAZOBACTAM 4.5 G: 36; 4.5 INJECTION, POWDER, FOR SOLUTION INTRAVENOUS at 15:42

## 2025-04-08 RX ADMIN — PIPERACILLIN AND TAZOBACTAM 4.5 G: 36; 4.5 INJECTION, POWDER, FOR SOLUTION INTRAVENOUS at 22:49

## 2025-04-08 NOTE — ASSESSMENT & PLAN NOTE
"Presenting with 1-2 days of nausea , vomiting, diarrhea  & diffuse abdominal pain more in lower abdomen.     4/7/25 CT AP \"STOMACH AND BOWEL: Allergic contrast. Mild to moderate distention of the stomach otherwise grossly unremarkable. Several mildly fluid distended loops of small bowel are seen in the lower abdomen and pelvis, nonspecific. No evidence for bowel obstruction.   Colonic diverticulosis without evidence for acute diverticulitis. Long segment wall thickening involving the sigmoid colon likely on the basis of chronic diverticular disease\"       Enteritis with leukocytosis   IV fluids   Zosyn   Blood culture x 2 pending   Obtain stool culture and C. Diff testing if able  PRN phenergan   PRN pain meds   Holding aspirin for now , he takes it for primary prophylaxis   Continue PPI and continue his PRN Pepcid (lower to 10 mg BID PRN)   Rounded on patient x 2 still having abdominal pain nausea poor appetite.  Will monitor overnight likely can discharge home tomorrow    "

## 2025-04-08 NOTE — ASSESSMENT & PLAN NOTE
Evident by leukocytosis 17 and heart rate 99 upon presentation to the ED in setting of suspected enteritis  Patient did receive boluses of IV fluids  Overall improving patient has remained afebrile  Leukocytosis nearly resolved heart rate now in the 80s

## 2025-04-08 NOTE — H&P
"H&P - Hospitalist   Name: Cory Sheppard 49 y.o. male I MRN: [id number]  Unit/Bed#: ED 23 I Date of Admission: 4/7/2025   Date of Service: 4/8/2025 I Hospital Day: 0     Assessment & Plan  Enteritis  Presenting with 1-2 days of nausea , vomiting, diarrhea  & diffuse abdominal pain more in lower abdomen.     4/7/25 CT AP \"STOMACH AND BOWEL: Allergic contrast. Mild to moderate distention of the stomach otherwise grossly unremarkable. Several mildly fluid distended loops of small bowel are seen in the lower abdomen and pelvis, nonspecific. No evidence for bowel obstruction.   Colonic diverticulosis without evidence for acute diverticulitis. Long segment wall thickening involving the sigmoid colon likely on the basis of chronic diverticular disease\"       Enteritis with leukocytosis   IV fluids   Zosyn   Blood culture , stool culture and C. Diff testing   PRN phenergan   PRN pain meds   Holding aspirin for now , he takes it for primary prophylaxis   Adding PPI and continue his PRN Pepcid (lower to 10 mg BID PRN)   If not improved , consider GI Consultation     Nausea and vomiting  PRN phenergan ; QTC WNL   Dehydration  Due to the N/V ; UA + ketones ; Cr at baseline but elevated bilirubin   IV fluids ordered   Leukocytosis  Due to enteritis , treatment as above   Elevated bilirubin  CT AP 4/7/25 GALLBLADDER: No calcified gallstones. No pericholecystic inflammatory change.  In setting of dehydration, enteritis, N/V , see above treatment  Daily CMP , and revaluate           VTE Pharmacologic Prophylaxis:   Moderate Risk (Score 3-4) - Pharmacological DVT Prophylaxis Ordered: enoxaparin (Lovenox).  Code Status: Level 1 - Full Code   Discussion with family: Patient declined call to .     Anticipated Length of Stay: Patient will be admitted on an inpatient basis with an anticipated length of stay of greater than 2 midnights secondary to N/V/Abd Pain management as above .    History of Present Illness "   Chief Complaint:     Cory Sheppard is a 49 y.o. male with a PMH of hemorrhoids, GERD, psoriasis, migraine who presents with acute abdominal pain nausea vomiting diarrhea X 1 to 2 days.  Denies sick contact .  Imaging concerning for enteritis.  Leukocytosis present but no fever .  Above assessment and plan explained to the patient, he verbalized understanding and agreement, will continue on IV fluids and antibiotics, p.o. intake to advance as tolerable.  No other questions at the moment .    Review of Systems   Constitutional:  Negative for activity change, chills, fatigue, fever and unexpected weight change.   HENT:  Negative for congestion, hearing loss, nosebleeds, rhinorrhea, sinus pressure, sinus pain, sore throat and trouble swallowing.    Eyes:  Negative for photophobia and visual disturbance.   Respiratory:  Negative for cough, choking, chest tightness, shortness of breath and wheezing.    Cardiovascular:  Negative for chest pain, palpitations and leg swelling.   Gastrointestinal:  Positive for abdominal pain, diarrhea, nausea and vomiting. Negative for abdominal distention, anal bleeding, blood in stool and constipation.   Genitourinary:  Negative for decreased urine volume and difficulty urinating.   Musculoskeletal:  Negative for neck pain and neck stiffness.   Skin:  Negative for rash and wound.   Neurological:  Negative for dizziness, speech difficulty, weakness, numbness and headaches.   Hematological:  Negative for adenopathy. Does not bruise/bleed easily.   Psychiatric/Behavioral:  Negative for agitation and behavioral problems.        Historical Information   Past Medical History:   Diagnosis Date    Arthritis     Chest pain     Colon polyp     GERD (gastroesophageal reflux disease)     Hemorrhoids, complicated 03/10/2017    Kidney stone     Kidney stones     Migraine     Psoriasis     Psoriatic arthritis (HCC)      Past Surgical History:   Procedure Laterality Date    COLONOSCOPY       CYSTOSCOPY      HERNIA REPAIR      KIDNEY STONE SURGERY      LA COLONOSCOPY FLX DX W/COLLJ SPEC WHEN PFRMD N/A 03/10/2017    Procedure: COLONOSCOPY;  Surgeon: Jose Cruz Boo MD;  Location: [facility];  Service: Colorectal    ULNAR NERVE REPAIR       Social History     Tobacco Use    Smoking status: Never    Smokeless tobacco: Never   Vaping Use    Vaping status: Never Used   Substance and Sexual Activity    Alcohol use: No    Drug use: No    Sexual activity: Not on file     E-Cigarette/Vaping    E-Cigarette Use Never User      E-Cigarette/Vaping Substances    Nicotine No     THC No     CBD No     Flavoring No     Other No     Unknown No      Family History   Problem Relation Age of Onset    Hypertension Mother     Liver cancer Father      Social History:  Marital Status: /Civil Union   Occupation:   Patient Pre-hospital Living Situation: Home  Patient Pre-hospital Level of Mobility: walks  Patient Pre-hospital Diet Restrictions: none     Meds/Allergies   I have reviewed home medications with patient personally.  Prior to Admission medications    Medication Sig Start Date End Date Taking? Authorizing Provider   alclomethasone (ACLOVATE) 0.05 % cream Apply topically    Historical Provider, MD   aspirin 81 mg chewable tablet Chew 1 tablet (81 mg total) daily 8/29/18   Geoff Harrington DO   Azelaic Acid 15 % cream As needed    Historical Provider, MD   clotrimazole (LOTRIMIN) 1 % cream APPLY TO RASH ON FEET TWICE DAILY FOR 2-4 WEEKS    Historical Provider, MD   Dermatological Products, [facility] [place] [facility]. (ELETONE) CREA As needed    Historical Provider, MD   escitalopram (LEXAPRO) 10 mg tablet  4/28/22   Historical Provider, MD   famotidine (PEPCID) 20 mg tablet Take 1 tablet (20 mg total) by mouth 2 (two) times a day 5/9/22   Patty Gaffney MD   fluticasone (FLONASE) 50 mcg/act nasal spray if needed 1/7/23   Historical Provider, MD   gabapentin (NEURONTIN) 300 mg capsule  1/13/23   Historical Provider, MD   Halcinonide 0.1 %  CREA Apply topically    Historical Provider, MD   hyoscyamine (LEVSIN/SL) 0.125 mg SL tablet Take 1 tablet (0.125 mg total) by mouth every 4 (four) hours as needed for cramping for up to 60 doses 2/11/25   Jose Cruz Boo MD   loperamide (IMODIUM A-D) 2 MG tablet Take 2 mg by mouth 4 (four) times a day as needed    Historical Provider, MD   metroNIDAZOLE (METROGEL) 1 % gel As needed    Historical Provider, MD   Multiple Vitamin (MULTIVITAMIN) tablet Take 1 tablet by mouth daily    Historical Provider, MD     Allergies   Allergen Reactions    Cetirizine Angioedema     Hives and tongue swelling and itching    Xyzal [Levocetirizine] Anaphylaxis and GI Intolerance    Etodolac Rash     Has tolerated other NSAIDs without reaction    Leflunomide Itching, GI Intolerance and Swelling       Objective :  Temp:  [97.8 °F (36.6 °C)] 97.8 °F (36.6 °C)  HR:  [82-99] 82  BP: (108-168)/() 108/71  Resp:  [17-18] 17  SpO2:  [93 %-100 %] 93 %  O2 Device: None (Room air)    Physical Exam  Vitals and nursing note reviewed.   Constitutional:       General: He is not in acute distress.     Appearance: He is well-developed.   HENT:      Head: Normocephalic and atraumatic.      Right Ear: External ear normal.      Left Ear: External ear normal.      Mouth/Throat:      Mouth: Mucous membranes are dry.      Pharynx: No oropharyngeal exudate or posterior oropharyngeal erythema.   Eyes:      General: No scleral icterus.     Conjunctiva/sclera: Conjunctivae normal.   Cardiovascular:      Rate and Rhythm: Normal rate and regular rhythm.      Heart sounds: No murmur heard.  Pulmonary:      Effort: Pulmonary effort is normal. No respiratory distress.      Breath sounds: Normal breath sounds.   Abdominal:      General: There is no distension.      Palpations: Abdomen is soft. There is no mass.      Tenderness: There is abdominal tenderness. There is no guarding or rebound.   Musculoskeletal:         General: No swelling.      Cervical back:  Neck supple.   Skin:     General: Skin is warm and dry.      Capillary Refill: Capillary refill takes less than 2 seconds.      Coloration: Skin is not jaundiced or pale.      Findings: No lesion or rash.   Neurological:      General: No focal deficit present.      Mental Status: He is alert and oriented to person, place, and time.   Psychiatric:         Mood and Affect: Mood normal.         Behavior: Behavior normal.         Thought Content: Thought content normal.         Judgment: Judgment normal.          Lines/Drains:            Lab Results: I have reviewed the following results:  Results from last 7 days   Lab Units 04/07/25  2207   WBC Thousand/uL 17.66*   HEMOGLOBIN g/dL 17.4*   HEMATOCRIT % 50.9*   PLATELETS Thousands/uL 296   SEGS PCT % 90*   LYMPHO PCT % 2*   MONO PCT % 5   EOS PCT % 1     Results from last 7 days   Lab Units 04/07/25  2207   SODIUM mmol/L 137   POTASSIUM mmol/L 3.7   CHLORIDE mmol/L 105   CO2 mmol/L 21   BUN mg/dL 20   CREATININE mg/dL 0.84   ANION GAP mmol/L 11   CALCIUM mg/dL 8.9   ALBUMIN g/dL 4.8   TOTAL BILIRUBIN mg/dL 1.85*   ALK PHOS U/L 64   ALT U/L 46   AST U/L 33   GLUCOSE RANDOM mg/dL 139             Lab Results   Component Value Date    HGBA1C 5.7 (H) 02/22/2025    HGBA1C 5.7 (H) 08/24/2024    HGBA1C 5.6 03/29/2024     Results from last 7 days   Lab Units 04/07/25  2207   LACTIC ACID mmol/L 2.0       Imaging Results Review: I reviewed radiology reports from this admission including: CT abdomen/pelvis.      Administrative Statements   I have spent a total time of 45 minutes in caring for this patient on the day of the visit/encounter including Diagnostic results, Prognosis, Risks and benefits of tx options, Instructions for management, Patient and family education, Importance of tx compliance, Risk factor reductions, Impressions, Counseling / Coordination of care, Documenting in the medical record, Reviewing/placing orders in the medical record (including tests, medications,  and/or procedures), Obtaining or reviewing history  , and Communicating with other healthcare professionals .    ** Please Note: This note has been constructed using a voice recognition system. **

## 2025-04-08 NOTE — ED PROVIDER NOTES
Time reflects when diagnosis was documented in both MDM as applicable and the Disposition within this note       Time User Action Codes Description Comment    4/8/2025  1:11 AM Alysia Lindo Add [R10.9] Abdominal pain     4/8/2025  1:12 AM Kristy Lindoia Add [K52.9] Enteritis     4/8/2025  1:12 AM PlayitaKristy zunigaia Add [R11.2] Nausea and vomiting     4/8/2025  1:12 AM PlayitaKristy zunigaia Modify [R10.9] Abdominal pain     4/8/2025  1:12 AM Playita, Alysia Modify [K52.9] Enteritis     4/8/2025  1:12 AM Kristy Lindoia Add [R19.7] Diarrhea           ED Disposition       ED Disposition   Admit    Condition   Stable    Date/Time   Tue Apr 8, 2025  1:12 AM    Comment   Case was discussed with Dr. Guerrero and the patient's admission status was agreed to be Admission Status: observation status to the service of Dr. Guerrero .               Assessment & Plan       Medical Decision Making  VSS. Leukocytosis, 17.66. Otherwise, labs stable. Normal lactic acid. CT scan with enteritis. Patient required multiple rounds of IV narcotics and antiemetics d/t intractable abdominal pain, N/V.  Patient admitted to [facility].     Amount and/or Complexity of Data Reviewed  Labs: ordered. Decision-making details documented in ED Course.  Radiology: ordered. Decision-making details documented in ED Course.    Risk  Prescription drug management.  Decision regarding hospitalization.        ED Course as of 04/09/25 0644   Mon Apr 07, 2025   2218 WBC(!): 17.66  Increased- previously, 6.72 x1 month ago    2235 LACTIC ACID: 2.0   Tue Apr 08, 2025   0012 Ketones, UA(!): 20 (1+)   0047 CT abdomen pelvis with contrast  IMPRESSION:     Findings above which may reflect enteritis in the appropriate clinical setting.     Colonic diverticulosis without evidence of acute diverticulitis.               Medications   piperacillin-tazobactam (ZOSYN) IVPB 4.5 g (0 g Intravenous Stopped 4/8/25 0330)     Followed by   piperacillin-tazobactam (ZOSYN) IVPB (EXTENDED INFUSION) 4.5 g (0  g Intravenous Stopped 4/8/25 1019)   sodium chloride 0.9 % infusion (125 mL/hr Intravenous New Bag 4/8/25 0333)   promethazine (PHENERGAN) injection 25 mg (25 mg Intramuscular Given 4/8/25 0338)   pantoprazole (PROTONIX) injection 40 mg (40 mg Intravenous Given 4/8/25 0343)   escitalopram (LEXAPRO) tablet 10 mg (10 mg Oral Given 4/8/25 0830)   famotidine (PEPCID) tablet 10 mg (10 mg Oral Given 4/8/25 0830)   gabapentin (NEURONTIN) capsule 400 mg (400 mg Oral Given 4/8/25 0830)   loperamide (IMODIUM) capsule 2 mg (has no administration in time range)   enoxaparin (LOVENOX) subcutaneous injection 40 mg (40 mg Subcutaneous Given 4/8/25 0830)   HYDROmorphone (DILAUDID) injection 1 mg (1 mg Intravenous Given 4/8/25 1020)   acetaminophen (Ofirmev) injection 1,000 mg (has no administration in time range)   sodium chloride 0.9 % bolus 1,000 mL (0 mL Intravenous Stopped 4/7/25 2329)   ondansetron (ZOFRAN) injection 4 mg (4 mg Intravenous Given 4/7/25 2223)   fentaNYL injection 50 mcg (50 mcg Intravenous Given 4/7/25 2224)   Famotidine (PF) (PEPCID) injection 20 mg (20 mg Intravenous Given 4/7/25 2232)   pantoprazole (PROTONIX) injection 40 mg (40 mg Intravenous Given 4/7/25 2226)   iohexol (OMNIPAQUE) 350 MG/ML injection (MULTI-DOSE) 100 mL (100 mL Intravenous Given 4/7/25 2315)   fentaNYL injection 50 mcg (50 mcg Intravenous Given 4/8/25 0024)   diphenhydrAMINE (BENADRYL) injection 12.5 mg (12.5 mg Intravenous Given 4/8/25 0102)       ED Risk Strat Scores                    No data recorded        SBIRT 20yo+      Flowsheet Row Most Recent Value   Initial Alcohol Screen: US AUDIT-C     1. How often do you have a drink containing alcohol? 0 Filed at: 04/07/2025 2301   2. How many drinks containing alcohol do you have on a typical day you are drinking?  0 Filed at: 04/07/2025 2301   3a. Male UNDER 65: How often do you have five or more drinks on one occasion? 0 Filed at: 04/07/2025 2301   Audit-C Score 0 Filed at: 04/07/2025  2301   REX: How many times in the past year have you...    Used an illegal drug or used a prescription medication for non-medical reasons? Never Filed at: 04/07/2025 2301                            History of Present Illness       Chief Complaint   Patient presents with    Abdominal Pain     Pt states lower abd pain for awhile but worsened today with n/v/d       Past Medical History:   Diagnosis Date    Arthritis     Chest pain     Colon polyp     GERD (gastroesophageal reflux disease)     Hemorrhoids, complicated 03/10/2017    Kidney stone     Kidney stones     Migraine     Psoriasis     Psoriatic arthritis (HCC)       Past Surgical History:   Procedure Laterality Date    COLONOSCOPY      CYSTOSCOPY      HERNIA REPAIR      KIDNEY STONE SURGERY      HI COLONOSCOPY FLX DX W/COLLJ SPEC WHEN PFRMD N/A 03/10/2017    Procedure: COLONOSCOPY;  Surgeon: Jose Cruz Boo MD;  Location: [facility];  Service: Colorectal    ULNAR NERVE REPAIR        Family History   Problem Relation Age of Onset    Hypertension Mother     Liver cancer Father       Social History     Tobacco Use    Smoking status: Never    Smokeless tobacco: Never   Vaping Use    Vaping status: Never Used   Substance Use Topics    Alcohol use: No    Drug use: No      E-Cigarette/Vaping    E-Cigarette Use Never User       E-Cigarette/Vaping Substances    Nicotine No     THC No     CBD No     Flavoring No     Other No     Unknown No       I have reviewed and agree with the history as documented.     Patient is a 49-year-old male who presents to the emergency room for abdominal pain.  Patient had a recent colonoscopy 03/21 that was unremarkable.  Reports lower abdominal pain that has been ongoing for a year.  Reports lower abdominal pain, nausea, vomiting, nonbloody watery diarrhea that is worse today.  Associated myalgias and headache.  Denies any other symptoms.  Denies recent travel or antibiotic use.  No medication for symptoms.           Review of Systems    Constitutional:  Negative for chills and fever.   HENT:  Negative for ear pain, sore throat, trouble swallowing and voice change.    Eyes:  Negative for pain and visual disturbance.   Respiratory:  Negative for cough and shortness of breath.    Cardiovascular:  Negative for chest pain and palpitations.   Gastrointestinal:  Positive for abdominal pain, diarrhea, nausea and vomiting. Negative for blood in stool and constipation.   Genitourinary:  Negative for dysuria, flank pain and hematuria.   Musculoskeletal:  Positive for myalgias. Negative for arthralgias and back pain.   Skin:  Negative for color change and rash.   Neurological:  Positive for headaches. Negative for seizures and syncope.   Psychiatric/Behavioral:  Negative for confusion.    All other systems reviewed and are negative.          Objective       ED Triage Vitals   Temperature Pulse Blood Pressure Respirations SpO2 Patient Position - Orthostatic VS   04/07/25 2125 04/07/25 2125 04/07/25 2126 04/07/25 2125 04/07/25 2125 04/07/25 2125   97.8 °F (36.6 °C) 99 (!) 168/102 18 100 % Sitting      Temp Source Heart Rate Source BP Location FiO2 (%) Pain Score    04/07/25 2125 04/07/25 2125 04/07/25 2125 -- 04/08/25 0024    Temporal Monitor Left arm  8      Vitals      Date and Time Temp Pulse SpO2 Resp BP Pain Score FACES Pain Rating User   04/09/25 0258 -- -- 90 % -- -- 10 - Worst Possible Pain --    04/08/25 2228 100.1 °F (37.8 °C) 90 95 % -- 131/77 -- -- DII   04/08/25 2028 -- -- -- -- -- 10 - Worst Possible Pain -- BM   04/08/25 1700 -- -- 94 % -- -- -- -- AMS   04/08/25 1554 -- 83 95 % -- -- 8 -- AMS   04/08/25 1500 -- -- 93 % -- -- -- -- AMS   04/08/25 1428 -- -- 96 % -- -- -- -- AMS   04/08/25 1318 -- -- -- -- -- 8 -- AMS   04/08/25 1318 97.5 °F (36.4 °C) 72 98 % 20 139/82 -- -- DII   04/08/25 1300 -- -- 96 % -- -- -- -- AMS   04/08/25 1200 -- 69 95 % 16 104/60 -- -- FB   04/08/25 1130 -- 72 94 % 16 105/59 -- -- FB   04/08/25 1030 -- 76 91 % 16  116/64 -- -- FB   04/08/25 1020 -- -- -- -- -- 8 -- FB   04/08/25 1000 -- 81 93 % 17 113/66 -- -- FB   04/08/25 0930 -- 77 94 % 17 114/69 -- -- FB   04/08/25 0900 -- 89 96 % 17 123/74 -- -- FB   04/08/25 0830 -- 82 96 % 17 112/69 -- -- FB   04/08/25 0800 -- 73 95 % 17 105/67 -- -- FB   04/08/25 0730 -- 73 94 % 17 111/65 -- -- FB   04/08/25 0700 -- 77 94 % 17 112/71 -- -- FB   04/08/25 0630 -- 76 94 % 17 126/62 -- -- FB   04/08/25 0530 -- 85 92 % 17 108/65 -- -- FB   04/08/25 0500 -- 85 93 % 17 100/63 -- -- FB   04/08/25 0430 -- 85 94 % 17 101/69 -- -- FB   04/08/25 0400 -- 82 93 % 17 108/71 -- -- FB   04/08/25 0337 -- -- -- -- -- 8 -- FB   04/08/25 0330 -- 88 95 % 17 128/71 -- -- FB   04/08/25 0300 -- 92 95 % 18 128/76 -- -- FB   04/08/25 0230 -- 92 94 % 18 128/78 -- -- FB   04/08/25 0200 -- 93 95 % 18 130/98 -- -- FB   04/08/25 0130 -- 91 95 % -- 130/84 -- -- ES   04/08/25 0100 -- 95 93 % -- 141/86 -- -- ES   04/08/25 0030 -- 95 96 % 18 132/84 -- -- ES   04/08/25 0024 -- -- -- -- -- 8 -- ES   04/08/25 0000 -- 94 96 % 18 132/82 -- -- ES   04/07/25 2126 -- -- 100 % -- 168/102 -- -- TE   04/07/25 2125 97.8 °F (36.6 °C) 99 100 % 18 -- -- -- TE            Physical Exam  Vitals and nursing note reviewed.   Constitutional:       General: He is not in acute distress.     Appearance: Normal appearance. He is well-developed.   HENT:      Head: Normocephalic and atraumatic.   Eyes:      Conjunctiva/sclera: Conjunctivae normal.   Cardiovascular:      Rate and Rhythm: Normal rate and regular rhythm.      Pulses: Normal pulses.      Heart sounds: No murmur heard.  Pulmonary:      Effort: Pulmonary effort is normal. No respiratory distress.      Breath sounds: Normal breath sounds.   Abdominal:      Palpations: Abdomen is soft.      Tenderness: There is abdominal tenderness in the right lower quadrant, suprapubic area and left lower quadrant. There is no right CVA tenderness or left CVA tenderness.   Musculoskeletal:          General: No swelling.      Cervical back: Normal range of motion and neck supple.   Skin:     General: Skin is warm and dry.      Capillary Refill: Capillary refill takes less than 2 seconds.   Neurological:      General: No focal deficit present.      Mental Status: He is alert and oriented to person, place, and time.   Psychiatric:         Mood and Affect: Mood normal.         Results Reviewed       Procedure Component Value Units Date/Time    Stool Enteric Bacterial Panel by PCR [[id number]] Collected: 04/08/25 1529    Lab Status: In process Specimen: Stool from Rectum Updated: 04/08/25 1531    Clostridioides difficile toxin by PCR with EIA [[id number]] Collected: 04/08/25 1529    Lab Status: In process Specimen: Stool from Rectum Updated: 04/08/25 1531    Blood culture [[id number]] Collected: 04/08/25 0216    Lab Status: Preliminary result Specimen: Blood from Hand, Left Updated: 04/08/25 0951     Blood Culture Received in Microbiology Lab. Culture in Progress.    Blood culture [[id number]] Collected: 04/08/25 0216    Lab Status: Preliminary result Specimen: Blood from Arm, Right Updated: 04/08/25 0901     Blood Culture Received in Microbiology Lab. Culture in Progress.    Comprehensive metabolic panel [[id number]]  (Abnormal) Collected: 04/08/25 0451    Lab Status: Final result Specimen: Blood from Arm, Right Updated: 04/08/25 0600     Sodium 138 mmol/L      Potassium 3.7 mmol/L      Chloride 108 mmol/L      CO2 23 mmol/L      ANION GAP 7 mmol/L      BUN 18 mg/dL      Creatinine 0.85 mg/dL      Glucose 116 mg/dL      Calcium 7.8 mg/dL      AST 29 U/L      ALT 39 U/L      Alkaline Phosphatase 50 U/L      Total Protein 6.1 g/dL      Albumin 3.9 g/dL      Total Bilirubin 1.89 mg/dL      eGFR 101 ml/min/1.73sq m     Narrative:      National Kidney Disease Foundation guidelines for Chronic Kidney Disease (CKD):     Stage 1 with normal or high GFR (GFR > 90 mL/min/1.73 square meters)    Stage 2 Mild CKD (GFR = 60-89  mL/min/1.73 square meters)    Stage 3A Moderate CKD (GFR = 45-59 mL/min/1.73 square meters)    Stage 3B Moderate CKD (GFR = 30-44 mL/min/1.73 square meters)    Stage 4 Severe CKD (GFR = 15-29 mL/min/1.73 square meters)    Stage 5 End Stage CKD (GFR <15 mL/min/1.73 square meters)  Note: GFR calculation is accurate only with a steady state creatinine    CBC and differential [[id number]]  (Abnormal) Collected: 04/08/25 0451    Lab Status: Final result Specimen: Blood from Arm, Right Updated: 04/08/25 0518     WBC 10.89 Thousand/uL      RBC 5.11 Million/uL      Hemoglobin 15.5 g/dL      Hematocrit 45.9 %      MCV 90 fL      MCH 30.3 pg      MCHC 33.8 g/dL      RDW 13.0 %      MPV 11.2 fL      Platelets 270 Thousands/uL      nRBC 0 /100 WBCs      Segmented % 90 %      Immature Grans % 0 %      Lymphocytes % 3 %      Monocytes % 6 %      Eosinophils Relative 0 %      Basophils Relative 1 %      Absolute Neutrophils 9.80 Thousands/µL      Absolute Immature Grans 0.04 Thousand/uL      Absolute Lymphocytes 0.35 Thousands/µL      Absolute Monocytes 0.60 Thousand/µL      Eosinophils Absolute 0.04 Thousand/µL      Basophils Absolute 0.06 Thousands/µL     Narrative:      This is an appended report.  These results have been appended to a previously verified report.    UA w Reflex to Microscopic w Reflex to Culture [[id number]]  (Abnormal) Collected: 04/07/25 2354    Lab Status: Final result Specimen: Urine, Clean Catch Updated: 04/08/25 0006     Color, UA Light Yellow     Clarity, UA Clear     Specific Gravity, UA >=1.050     pH, UA 6.5     Leukocytes, UA Negative     Nitrite, UA Negative     Protein, UA Negative mg/dl      Glucose, UA Negative mg/dl      Ketones, UA 20 (1+) mg/dl      Urobilinogen, UA <2.0 mg/dl      Bilirubin, UA Negative     Occult Blood, UA Negative    CBC and differential [[id number]]  (Abnormal) Collected: 04/07/25 2207    Lab Status: Final result Specimen: Blood from Arm, Right Updated: 04/07/25 2239     WBC  17.66 Thousand/uL      RBC 5.79 Million/uL      Hemoglobin 17.4 g/dL      Hematocrit 50.9 %      MCV 88 fL      MCH 30.1 pg      MCHC 34.2 g/dL      RDW 12.9 %      MPV 11.1 fL      Platelets 296 Thousands/uL      nRBC 0 /100 WBCs      Segmented % 90 %      Immature Grans % 1 %      Lymphocytes % 2 %      Monocytes % 5 %      Eosinophils Relative 1 %      Basophils Relative 1 %      Absolute Neutrophils 16.15 Thousands/µL      Absolute Immature Grans 0.09 Thousand/uL      Absolute Lymphocytes 0.38 Thousands/µL      Absolute Monocytes 0.88 Thousand/µL      Eosinophils Absolute 0.08 Thousand/µL      Basophils Absolute 0.08 Thousands/µL     Narrative:      This is an appended report.  These results have been appended to a previously verified report.    Comprehensive metabolic panel [[id number]]  (Abnormal) Collected: 04/07/25 2207    Lab Status: Final result Specimen: Blood from Arm, Right Updated: 04/07/25 2235     Sodium 137 mmol/L      Potassium 3.7 mmol/L      Chloride 105 mmol/L      CO2 21 mmol/L      ANION GAP 11 mmol/L      BUN 20 mg/dL      Creatinine 0.84 mg/dL      Glucose 139 mg/dL      Calcium 8.9 mg/dL      AST 33 U/L      ALT 46 U/L      Alkaline Phosphatase 64 U/L      Total Protein 7.6 g/dL      Albumin 4.8 g/dL      Total Bilirubin 1.85 mg/dL      eGFR 102 ml/min/1.73sq m     Narrative:      National Kidney Disease Foundation guidelines for Chronic Kidney Disease (CKD):     Stage 1 with normal or high GFR (GFR > 90 mL/min/1.73 square meters)    Stage 2 Mild CKD (GFR = 60-89 mL/min/1.73 square meters)    Stage 3A Moderate CKD (GFR = 45-59 mL/min/1.73 square meters)    Stage 3B Moderate CKD (GFR = 30-44 mL/min/1.73 square meters)    Stage 4 Severe CKD (GFR = 15-29 mL/min/1.73 square meters)    Stage 5 End Stage CKD (GFR <15 mL/min/1.73 square meters)  Note: GFR calculation is accurate only with a steady state creatinine    Lipase [[id number]]  (Normal) Collected: 04/07/25 2207    Lab Status: Final result  Specimen: Blood from Arm, Right Updated: 04/07/25 2235     Lipase 51 u/L     Lactic acid, plasma (w/reflex if result > 2.0) [[id number]]  (Normal) Collected: 04/07/25 2207    Lab Status: Final result Specimen: Blood from Arm, Right Updated: 04/07/25 2234     LACTIC ACID 2.0 mmol/L     Narrative:      Result may be elevated if tourniquet was used during collection.            CT abdomen pelvis with contrast   Final Interpretation by Sumeet Mckeon MD (04/08 0044)      Findings above which may reflect enteritis in the appropriate clinical setting.      Colonic diverticulosis without evidence of acute diverticulitis.         Workstation performed: [id number]             Procedures    ED Medication and Procedure Management   Prior to Admission Medications   Prescriptions Last Dose Informant Patient Reported? Taking?   Azelaic Acid 15 % cream  Self Yes No   Sig: As needed   Dermatological Products, Misc. (ELETONE) CREA  Self Yes No   Sig: As needed   Halcinonide 0.1 % CREA  Self Yes No   Sig: Apply topically   Multiple Vitamin (MULTIVITAMIN) tablet  Self Yes No   Sig: Take 1 tablet by mouth daily   alclomethasone (ACLOVATE) 0.05 % cream  Self Yes No   Sig: Apply topically   aspirin 81 mg chewable tablet 4/7/2025 Self No Yes   Sig: Chew 1 tablet (81 mg total) daily   clotrimazole (LOTRIMIN) 1 % cream  Self Yes No   Sig: APPLY TO RASH ON FEET TWICE DAILY FOR 2-4 WEEKS   escitalopram (LEXAPRO) 10 mg tablet 4/7/2025 Self Yes Yes   famotidine (PEPCID) 20 mg tablet 4/7/2025 Self No Yes   Sig: Take 1 tablet (20 mg total) by mouth 2 (two) times a day   Patient taking differently: Take 20 mg by mouth 2 (two) times a day as needed for indigestion   fluticasone (FLONASE) 50 mcg/act nasal spray  Self Yes No   Sig: if needed   gabapentin (NEURONTIN) 300 mg capsule  Self Yes No   Sig: Take 400 mg by mouth 3 (three) times a day   hyoscyamine (LEVSIN/SL) 0.125 mg SL tablet  Self No No   Sig: Take 1 tablet (0.125 mg total) by mouth every 4  (four) hours as needed for cramping for up to 60 doses   loperamide (IMODIUM A-D) 2 MG tablet  Self Yes No   Sig: Take 2 mg by mouth 4 (four) times a day as needed   metroNIDAZOLE (METROGEL) 1 % gel  Self Yes No   Sig: As needed      Facility-Administered Medications: None     Current Discharge Medication List        CONTINUE these medications which have NOT CHANGED    Details   aspirin 81 mg chewable tablet Chew 1 tablet (81 mg total) daily  Qty: 90 tablet, Refills: 3    Associated Diagnoses: Chest pain      escitalopram (LEXAPRO) 10 mg tablet       famotidine (PEPCID) 20 mg tablet Take 1 tablet (20 mg total) by mouth 2 (two) times a day  Qty: 30 tablet, Refills: 0    Associated Diagnoses: Gastritis      alclomethasone (ACLOVATE) 0.05 % cream Apply topically      Azelaic Acid 15 % cream As needed      clotrimazole (LOTRIMIN) 1 % cream APPLY TO RASH ON FEET TWICE DAILY FOR 2-4 WEEKS      Dermatological Products, Misc. (ELETONE) CREA As needed      fluticasone (FLONASE) 50 mcg/act nasal spray if needed      gabapentin (NEURONTIN) 300 mg capsule Take 400 mg by mouth 3 (three) times a day      Halcinonide 0.1 % CREA Apply topically      hyoscyamine (LEVSIN/SL) 0.125 mg SL tablet Take 1 tablet (0.125 mg total) by mouth every 4 (four) hours as needed for cramping for up to 60 doses  Qty: 30 tablet, Refills: 1    Associated Diagnoses: Abdominal bloating with cramps      loperamide (IMODIUM A-D) 2 MG tablet Take 2 mg by mouth 4 (four) times a day as needed      metroNIDAZOLE (METROGEL) 1 % gel As needed      Multiple Vitamin (MULTIVITAMIN) tablet Take 1 tablet by mouth daily           No discharge procedures on file.  ED SEPSIS DOCUMENTATION   Time reflects when diagnosis was documented in both MDM as applicable and the Disposition within this note       Time User Action Codes Description Comment    4/8/2025  1:11 AM Alysia Lindo Add [R10.9] Abdominal pain     4/8/2025  1:12 AM Alysia Lindo [K52.9] Enteritis      4/8/2025  1:12 AM Alysia Lindo Add [R11.2] Nausea and vomiting     4/8/2025  1:12 AM Alysia Lindo Modify [R10.9] Abdominal pain     4/8/2025  1:12 AM Alysia Lindo Modify [K52.9] Enteritis     4/8/2025  1:12 AM Alysia Lindo [R19.7] Diarrhea                  Alysia Lindo PA-C  04/09/25 0644

## 2025-04-08 NOTE — ASSESSMENT & PLAN NOTE
CT AP 4/7/25 GALLBLADDER: No calcified gallstones. No pericholecystic inflammatory change.  In setting of dehydration, enteritis, N/V , see above treatment  Daily CMP , and revaluate

## 2025-04-08 NOTE — PROGRESS NOTES
"Progress Note - Hospitalist   Name: Cory Sheppard 49 y.o. male I MRN: [id number]  Unit/Bed#: [facility] I Date of Admission: 4/7/2025   Date of Service: 4/8/2025 I Hospital Day: 0    Assessment & Plan  Enteritis  Presenting with 1-2 days of nausea , vomiting, diarrhea  & diffuse abdominal pain more in lower abdomen.     4/7/25 CT AP \"STOMACH AND BOWEL: Allergic contrast. Mild to moderate distention of the stomach otherwise grossly unremarkable. Several mildly fluid distended loops of small bowel are seen in the lower abdomen and pelvis, nonspecific. No evidence for bowel obstruction.   Colonic diverticulosis without evidence for acute diverticulitis. Long segment wall thickening involving the sigmoid colon likely on the basis of chronic diverticular disease\"       Enteritis with leukocytosis   IV fluids   Zosyn   Blood culture x 2 pending   Obtain stool culture and C. Diff testing if able  PRN phenergan   PRN pain meds   Holding aspirin for now , he takes it for primary prophylaxis   Continue PPI and continue his PRN Pepcid (lower to 10 mg BID PRN)   Rounded on patient x 2 still having abdominal pain nausea poor appetite.  Will monitor overnight likely can discharge home tomorrow    Nausea and vomiting  PRN phenergan ; QTC WNL   Dehydration  Due to the N/V ; UA + ketones ; Cr at baseline but elevated bilirubin   Continue IV fluids   Leukocytosis  Due to enteritis , improved treatment as above   Elevated bilirubin  CT AP 4/7/25 GALLBLADDER: No calcified gallstones. No pericholecystic inflammatory change.  In setting of dehydration, enteritis, N/V , see above treatment  Daily CMP , and revaluate       Sepsis (HCC)  Evident by leukocytosis 17 and heart rate 99 upon presentation to the ED in setting of suspected enteritis  Patient did receive boluses of IV fluids  Overall improving patient has remained afebrile  Leukocytosis nearly resolved heart rate now in the 80s    VTE Pharmacologic Prophylaxis:    " Enoxaparin    Mobility:   Basic Mobility Inpatient Raw Score: 24  JH-HLM Goal: 8: Walk 250 feet or more  JH-HLM Achieved: 8: Walk 250 feet ot more  JH-HLM Goal achieved. Continue to encourage appropriate mobility.    Patient Centered Rounds: I performed bedside rounds with nursing staff today.   Discussions with Specialists or Other Care Team Provider: None    Education and Discussions with Family / Patient: Patient declined call to .     Current Length of Stay: 0 day(s)  Current Patient Status: Observation   Certification Statement: The patient, admitted on an observation basis, will now require > 2 midnight hospital stay due to ongoing abdominal pain  Discharge Plan: Anticipate discharge tomorrow to home.    Code Status: Level 1 - Full Code    Subjective   Patient reporting poor appetite has been drinking some but when eating having increased abdominal pain reports diarrhea has stopped.  Requesting something more bland added toast and crackers this afternoon to see if that would help his symptoms    Objective :  Temp:  [97.5 °F (36.4 °C)-97.8 °F (36.6 °C)] 97.5 °F (36.4 °C)  HR:  [69-99] 83  BP: (100-168)/() 139/82  Resp:  [16-20] 20  SpO2:  [91 %-100 %] 94 %  O2 Device: None (Room air)    Body mass index is 30.61 kg/m².     Input and Output Summary (last 24 hours):     Intake/Output Summary (Last 24 hours) at 4/8/2025 1721  Last data filed at 4/8/2025 1605  Gross per 24 hour   Intake 1200 ml   Output 1250 ml   Net -50 ml       Physical Exam  Vitals and nursing note reviewed.   Constitutional:       General: He is not in acute distress.     Appearance: He is well-developed.   HENT:      Head: Normocephalic and atraumatic.   Eyes:      Conjunctiva/sclera: Conjunctivae normal.   Cardiovascular:      Rate and Rhythm: Normal rate and regular rhythm.      Heart sounds: No murmur heard.  Pulmonary:      Effort: Pulmonary effort is normal. No respiratory distress.      Breath sounds: Normal breath  sounds.   Abdominal:      Palpations: Abdomen is soft.      Tenderness: There is abdominal tenderness.   Musculoskeletal:         General: No swelling.      Cervical back: Neck supple.   Skin:     General: Skin is warm and dry.      Capillary Refill: Capillary refill takes less than 2 seconds.   Neurological:      Mental Status: He is alert.   Psychiatric:         Mood and Affect: Mood normal.           Lines/Drains:              Lab Results: I have reviewed the following results:   Results from last 7 days   Lab Units 04/08/25  0451   WBC Thousand/uL 10.89*   HEMOGLOBIN g/dL 15.5   HEMATOCRIT % 45.9   PLATELETS Thousands/uL 270   SEGS PCT % 90*   LYMPHO PCT % 3*   MONO PCT % 6   EOS PCT % 0     Results from last 7 days   Lab Units 04/08/25  0451   SODIUM mmol/L 138   POTASSIUM mmol/L 3.7   CHLORIDE mmol/L 108   CO2 mmol/L 23   BUN mg/dL 18   CREATININE mg/dL 0.85   ANION GAP mmol/L 7   CALCIUM mg/dL 7.8*   ALBUMIN g/dL 3.9   TOTAL BILIRUBIN mg/dL 1.89*   ALK PHOS U/L 50   ALT U/L 39   AST U/L 29   GLUCOSE RANDOM mg/dL 116                 Results from last 7 days   Lab Units 04/07/25  2207   LACTIC ACID mmol/L 2.0       Recent Cultures (last 7 days):   Results from last 7 days   Lab Units 04/08/25  0216   BLOOD CULTURE  Received in Microbiology Lab. Culture in Progress.  Received in Microbiology Lab. Culture in Progress.       Imaging Results Review: I reviewed radiology reports from this admission including: CT abdomen/pelvis.  Other Study Results Review: No additional pertinent studies reviewed.    Last 24 Hours Medication List:     Current Facility-Administered Medications:     acetaminophen (Ofirmev) injection 1,000 mg, Q6H PRN    enoxaparin (LOVENOX) subcutaneous injection 40 mg, Daily    escitalopram (LEXAPRO) tablet 10 mg, Daily    famotidine (PEPCID) tablet 10 mg, BID    gabapentin (NEURONTIN) capsule 400 mg, TID    HYDROmorphone (DILAUDID) injection 1 mg, Q4H PRN    loperamide (IMODIUM) capsule 2 mg, TID  PRN    pantoprazole (PROTONIX) injection 40 mg, Q24H EDMOND    [COMPLETED] piperacillin-tazobactam (ZOSYN) IVPB 4.5 g, Once, Last Rate: Stopped (04/08/25 0330) **FOLLOWED BY** piperacillin-tazobactam (ZOSYN) IVPB (EXTENDED INFUSION) 4.5 g, Q8H, Last Rate: 4.5 g (04/08/25 1542)    promethazine (PHENERGAN) injection 25 mg, Q6H PRN    sodium chloride 0.9 % infusion, Continuous, Last Rate: 125 mL/hr (04/08/25 1553)    Administrative Statements   Today, Patient Was Seen By: NIALL Eli  I have spent a total time of 45 minutes in caring for this patient on the day of the visit/encounter including Risks and benefits of tx options, Instructions for management, Documenting in the medical record, and Reviewing/placing orders in the medical record (including tests, medications, and/or procedures).    **Please Note: This note may have been constructed using a voice recognition system.**

## 2025-04-08 NOTE — ASSESSMENT & PLAN NOTE
"Presenting with 1-2 days of nausea , vomiting, diarrhea  & diffuse abdominal pain more in lower abdomen.     4/7/25 CT AP \"STOMACH AND BOWEL: Allergic contrast. Mild to moderate distention of the stomach otherwise grossly unremarkable. Several mildly fluid distended loops of small bowel are seen in the lower abdomen and pelvis, nonspecific. No evidence for bowel obstruction.   Colonic diverticulosis without evidence for acute diverticulitis. Long segment wall thickening involving the sigmoid colon likely on the basis of chronic diverticular disease\"       Enteritis with leukocytosis   IV fluids   Zosyn   Blood culture , stool culture and C. Diff testing   PRN phenergan   PRN pain meds   Holding aspirin for now , he takes it for primary prophylaxis   Adding PPI and continue his PRN Pepcid (lower to 10 mg BID PRN)   If not improved , consider GI Consultation     "

## 2025-04-09 PROBLEM — D72.829 LEUKOCYTOSIS: Status: RESOLVED | Noted: 2025-04-08 | Resolved: 2025-04-09

## 2025-04-09 LAB
ANION GAP SERPL CALCULATED.3IONS-SCNC: 8 MMOL/L (ref 4–13)
BUN SERPL-MCNC: 10 MG/DL (ref 5–25)
C COLI+JEJUNI TUF STL QL NAA+PROBE: NEGATIVE
CALCIUM SERPL-MCNC: 7.6 MG/DL (ref 8.4–10.2)
CHLORIDE SERPL-SCNC: 106 MMOL/L (ref 96–108)
CO2 SERPL-SCNC: 23 MMOL/L (ref 21–32)
CREAT SERPL-MCNC: 0.95 MG/DL (ref 0.6–1.3)
EC STX1+STX2 GENES STL QL NAA+PROBE: NEGATIVE
ERYTHROCYTE [DISTWIDTH] IN BLOOD BY AUTOMATED COUNT: 13.2 % (ref 11.6–15.1)
GFR SERPL CREATININE-BSD FRML MDRD: 92 ML/MIN/1.73SQ M
GLUCOSE P FAST SERPL-MCNC: 113 MG/DL (ref 65–99)
GLUCOSE SERPL-MCNC: 113 MG/DL (ref 65–140)
HCT VFR BLD AUTO: 46.7 % (ref 36.5–49.3)
HGB BLD-MCNC: 15.6 G/DL (ref 12–17)
MCH RBC QN AUTO: 29.8 PG (ref 26.8–34.3)
MCHC RBC AUTO-ENTMCNC: 33.4 G/DL (ref 31.4–37.4)
MCV RBC AUTO: 89 FL (ref 82–98)
PLATELET # BLD AUTO: 235 THOUSANDS/UL (ref 149–390)
PMV BLD AUTO: 11.2 FL (ref 8.9–12.7)
POTASSIUM SERPL-SCNC: 3.3 MMOL/L (ref 3.5–5.3)
RBC # BLD AUTO: 5.24 MILLION/UL (ref 3.88–5.62)
SALMONELLA SP SPAO STL QL NAA+PROBE: NEGATIVE
SHIGELLA SP+EIEC IPAH STL QL NAA+PROBE: NEGATIVE
SODIUM SERPL-SCNC: 137 MMOL/L (ref 135–147)
WBC # BLD AUTO: 7.61 THOUSAND/UL (ref 4.31–10.16)

## 2025-04-09 PROCEDURE — 80048 BASIC METABOLIC PNL TOTAL CA: CPT | Performed by: NURSE PRACTITIONER

## 2025-04-09 PROCEDURE — 85027 COMPLETE CBC AUTOMATED: CPT | Performed by: NURSE PRACTITIONER

## 2025-04-09 PROCEDURE — 99232 SBSQ HOSP IP/OBS MODERATE 35: CPT | Performed by: NURSE PRACTITIONER

## 2025-04-09 RX ORDER — DICYCLOMINE HYDROCHLORIDE 10 MG/1
10 CAPSULE ORAL
Status: DISCONTINUED | OUTPATIENT
Start: 2025-04-09 | End: 2025-04-11

## 2025-04-09 RX ORDER — SODIUM CHLORIDE 9 MG/ML
125 INJECTION, SOLUTION INTRAVENOUS CONTINUOUS
Status: DISCONTINUED | OUTPATIENT
Start: 2025-04-09 | End: 2025-04-10

## 2025-04-09 RX ORDER — POTASSIUM CHLORIDE 1500 MG/1
40 TABLET, EXTENDED RELEASE ORAL ONCE
Status: COMPLETED | OUTPATIENT
Start: 2025-04-09 | End: 2025-04-09

## 2025-04-09 RX ADMIN — PIPERACILLIN AND TAZOBACTAM 4.5 G: 36; 4.5 INJECTION, POWDER, FOR SOLUTION INTRAVENOUS at 06:55

## 2025-04-09 RX ADMIN — POTASSIUM CHLORIDE 40 MEQ: 1500 TABLET, EXTENDED RELEASE ORAL at 12:24

## 2025-04-09 RX ADMIN — LOPERAMIDE HYDROCHLORIDE 2 MG: 2 CAPSULE ORAL at 19:04

## 2025-04-09 RX ADMIN — PIPERACILLIN AND TAZOBACTAM 4.5 G: 36; 4.5 INJECTION, POWDER, FOR SOLUTION INTRAVENOUS at 16:48

## 2025-04-09 RX ADMIN — PANTOPRAZOLE SODIUM 40 MG: 40 INJECTION, POWDER, FOR SOLUTION INTRAVENOUS at 09:10

## 2025-04-09 RX ADMIN — SODIUM CHLORIDE 125 ML/HR: 0.9 INJECTION, SOLUTION INTRAVENOUS at 18:52

## 2025-04-09 RX ADMIN — GABAPENTIN 400 MG: 400 CAPSULE ORAL at 09:10

## 2025-04-09 RX ADMIN — ESCITALOPRAM OXALATE 10 MG: 10 TABLET ORAL at 09:11

## 2025-04-09 RX ADMIN — FAMOTIDINE 10 MG: 20 TABLET, FILM COATED ORAL at 09:11

## 2025-04-09 RX ADMIN — GABAPENTIN 400 MG: 400 CAPSULE ORAL at 22:01

## 2025-04-09 RX ADMIN — HYDROMORPHONE HYDROCHLORIDE 1 MG: 1 INJECTION, SOLUTION INTRAMUSCULAR; INTRAVENOUS; SUBCUTANEOUS at 12:31

## 2025-04-09 RX ADMIN — DICYCLOMINE HYDROCHLORIDE 10 MG: 10 CAPSULE ORAL at 16:48

## 2025-04-09 RX ADMIN — FAMOTIDINE 10 MG: 20 TABLET, FILM COATED ORAL at 18:44

## 2025-04-09 RX ADMIN — LOPERAMIDE HYDROCHLORIDE 2 MG: 2 CAPSULE ORAL at 12:31

## 2025-04-09 RX ADMIN — HYDROMORPHONE HYDROCHLORIDE 1 MG: 1 INJECTION, SOLUTION INTRAMUSCULAR; INTRAVENOUS; SUBCUTANEOUS at 18:51

## 2025-04-09 RX ADMIN — DICYCLOMINE HYDROCHLORIDE 10 MG: 10 CAPSULE ORAL at 22:01

## 2025-04-09 RX ADMIN — ENOXAPARIN SODIUM 40 MG: 40 INJECTION SUBCUTANEOUS at 09:10

## 2025-04-09 RX ADMIN — GABAPENTIN 400 MG: 400 CAPSULE ORAL at 16:38

## 2025-04-09 NOTE — PLAN OF CARE
Problem: PAIN - ADULT  Goal: Verbalizes/displays adequate comfort level or baseline comfort level  Description: Interventions:- Encourage patient to monitor pain and request assistance- Assess pain using appropriate pain scale- Administer analgesics based on type and severity of pain and evaluate response- Implement non-pharmacological measures as appropriate and evaluate response- Consider cultural and social influences on pain and pain management- Notify physician/advanced practitioner if interventions unsuccessful or patient reports new pain  Outcome: Progressing     Problem: INFECTION - ADULT  Goal: Absence or prevention of progression during hospitalization  Description: INTERVENTIONS:- Assess and monitor for signs and symptoms of infection- Monitor lab/diagnostic results- Monitor all insertion sites, i.e. indwelling lines, tubes, and drains- Monitor endotracheal if appropriate and nasal secretions for changes in amount and color- [place] appropriate cooling/warming therapies per order- Administer medications as ordered- Instruct and encourage patient and family to use good hand hygiene technique- Identify and instruct in appropriate isolation precautions for identified infection/condition  Outcome: Progressing  Goal: Absence of fever/infection during neutropenic period  Description: INTERVENTIONS:- Monitor WBC  Outcome: Progressing     Problem: SAFETY ADULT  Goal: Patient will remain free of falls  Description: INTERVENTIONS:- Educate patient/family on patient safety including physical limitations- Instruct patient to call for assistance with activity - Consult OT/PT to assist with strengthening/mobility - Keep Call bell within reach- Keep bed low and locked with side rails adjusted as appropriate- Keep care items and personal belongings within reach- Initiate and maintain comfort rounds- Make Fall Risk Sign visible to staff- Offer Toileting every 2 Hours, in advance of need- Initiate/Maintain 2alarm-  Obtain necessary fall risk management equipment: 2- Apply yellow socks and bracelet for high fall risk patients- Consider moving patient to room near nurses station  Outcome: Progressing  Goal: Maintain or return to baseline ADL function  Description: INTERVENTIONS:-  Assess patient's ability to carry out ADLs; assess patient's baseline for ADL function and identify physical deficits which impact ability to perform ADLs (bathing, care of mouth/teeth, toileting, grooming, dressing, etc.)- Assess/evaluate cause of self-care deficits - Assess range of motion- Assess patient's mobility; develop plan if impaired- Assess patient's need for assistive devices and provide as appropriate- Encourage maximum independence but intervene and supervise when necessary- Involve family in performance of ADLs- Assess for home care needs following discharge - Consider OT consult to assist with ADL evaluation and planning for discharge- Provide patient education as appropriate  Outcome: Progressing  Goal: Maintains/Returns to pre admission functional level  Description: INTERVENTIONS:- Perform AM-PAC 6 Click Basic Mobility/ Daily Activity assessment daily.- Set and communicate daily mobility goal to care team and patient/family/caregiver. - Collaborate with rehabilitation services on mobility goals if consulted- Perform Range of Motion 2 times a day.- Reposition patient every 2 hours.- Dangle patient 2 times a day- Stand patient 2 times a day- Ambulate patient 2 times a day- Out of bed to chair 2 times a day - Out of bed for meals 2 times a day- Out of bed for toileting- Record patient progress and toleration of activity level   Outcome: Progressing     Problem: DISCHARGE PLANNING  Goal: Discharge to home or other facility with appropriate resources  Description: INTERVENTIONS:- Identify barriers to discharge w/patient and caregiver- Arrange for needed discharge resources and transportation as appropriate- Identify discharge learning  needs (meds, wound care, etc.)- Arrange for interpretive services to assist at discharge as needed- Refer to Case Management Department for coordinating discharge planning if the patient needs post-hospital services based on physician/advanced practitioner order or complex needs related to functional status, cognitive ability, or social support system  Outcome: Progressing     Problem: Knowledge Deficit  Goal: Patient/family/caregiver demonstrates understanding of disease process, treatment plan, medications, and discharge instructions  Description: Complete learning assessment and assess knowledge base.Interventions:- Provide teaching at level of understanding- Provide teaching via preferred learning methods  Outcome: Progressing     Problem: GASTROINTESTINAL - ADULT  Goal: Minimal or absence of nausea and/or vomiting  Description: INTERVENTIONS:- Administer IV fluids if ordered to ensure adequate hydration- Maintain NPO status until nausea and vomiting are resolved- Nasogastric tube if ordered- Administer ordered antiemetic medications as needed- Provide nonpharmacologic comfort measures as appropriate- Advance diet as tolerated, if ordered- Consider nutrition services referral to assist patient with adequate nutrition and appropriate food choices  Outcome: Progressing  Goal: Maintains or returns to baseline bowel function  Description: INTERVENTIONS:- Assess bowel function- Encourage oral fluids to ensure adequate hydration- Administer IV fluids if ordered to ensure adequate hydration- Administer ordered medications as needed- Encourage mobilization and activity- Consider nutritional services referral to assist patient with adequate nutrition and appropriate food choices  Outcome: Progressing  Goal: Maintains adequate nutritional intake  Description: INTERVENTIONS:- Monitor percentage of each meal consumed- Identify factors contributing to decreased intake, treat as appropriate- Assist with meals as needed-  Monitor I&O, weight, and lab values if indicated- Obtain nutrition services referral as needed  Outcome: Progressing     Problem: GENITOURINARY - ADULT  Goal: Absence of urinary retention  Description: INTERVENTIONS:- Assess patient’s ability to void and empty bladder- Monitor I/O- Bladder scan as needed- Discuss with physician/AP medications to alleviate retention as needed- Discuss catheterization for long term situations as appropriate  Outcome: Progressing

## 2025-04-09 NOTE — PROGRESS NOTES
Progress Note - Hospitalist   Name: Cory Sheppard 49 y.o. male I MRN: [id number]  Unit/Bed#: [facility] I Date of Admission: 4/7/2025   Date of Service: 4/9/2025 I Hospital Day: 0    Assessment & Plan  Enteritis  Patient presented to the ED with complaints of nausea/vomiting, diarrhea with diffuse generalized abdominal pain x 1-2 days  CT Abd/Pelvis (4/7/25): Findings above which may reflect enteritis in the appropriate clinical setting. Colonic diverticulosis without evidence of acute diverticulitis.  Bacterial stool panel negative  Still reporting diarrhea; with still ongoing generalized abdominal discomfort but no vomiting  Will initiate Bentyl QID for now  If patient should persist with symptoms, will consult GI on 4/10  Nausea and vomiting  PRN phenergan ; QTC WNL   Dehydration  Continue IVF for hydration, still with multiple diarrheal BMs  Leukocytosis (Resolved: 4/9/2025)  Resolved  Elevated bilirubin  Likely reactive  Follow-up with CMP tomorrow  Sepsis (HCC)  Evident by leukocytosis 17 and heart rate 99 upon presentation to the ED in setting of suspected enteritis, dehydration  Resolved    VTE Pharmacologic Prophylaxis:    Enoxaparin    Mobility:   Basic Mobility Inpatient Raw Score: 24  JH-HLM Goal: 8: Walk 250 feet or more  JH-HLM Achieved: 8: Walk 250 feet ot more  JH-HLM Goal achieved. Continue to encourage appropriate mobility.    Patient Centered Rounds: I performed bedside rounds with nursing staff today.   Discussions with Specialists or Other Care Team Provider: Case management    Education and Discussions with Family / Patient:  Family at bedside.     Current Length of Stay: 0 day(s)  Current Patient Status: Observation   Certification Statement: The patient will continue to require additional inpatient hospital stay due to ongoing diarrheal bm's with gen abd pain  Discharge Plan: Anticipate discharge tomorrow to home.    Code Status: Level 1 - Full Code    Subjective   Patient reports that  he is doing fine with liquids but any time he eats something more solid, he starts having abdominal pain, and diarrhea.  Reported 3 Bms from wake up to 10:30 am this morning.    Objective :  Temp:  [97.5 °F (36.4 °C)-100.1 °F (37.8 °C)] 97.5 °F (36.4 °C)  HR:  [70-90] 70  BP: (131-137)/(77-91) 134/80  Resp:  [16-18] 18  SpO2:  [90 %-98 %] 96 %  O2 Device: None (Room air)    Body mass index is 30.61 kg/m².     Input and Output Summary (last 24 hours):     Intake/Output Summary (Last 24 hours) at 4/9/2025 1547  Last data filed at 4/9/2025 0524  Gross per 24 hour   Intake 2000 ml   Output 1425 ml   Net 575 ml       Physical Exam  Vitals and nursing note reviewed.   Constitutional:       General: He is not in acute distress.     Appearance: He is obese. He is ill-appearing.   Cardiovascular:      Rate and Rhythm: Normal rate.      Pulses: Normal pulses.   Pulmonary:      Effort: Pulmonary effort is normal.   Abdominal:      General: Bowel sounds are normal. There is no distension.      Palpations: Abdomen is soft.      Tenderness: There is no abdominal tenderness.   Musculoskeletal:         General: Normal range of motion.      Right lower leg: No edema.      Left lower leg: No edema.   Skin:     General: Skin is warm and dry.   Neurological:      Mental Status: He is alert and oriented to person, place, and time.   Psychiatric:         Mood and Affect: Mood normal.           Lines/Drains:              Lab Results: I have reviewed the following results:   Results from last 7 days   Lab Units 04/09/25  0520 04/08/25  0451   WBC Thousand/uL 7.61 10.89*   HEMOGLOBIN g/dL 15.6 15.5   HEMATOCRIT % 46.7 45.9   PLATELETS Thousands/uL 235 270   SEGS PCT %  --  90*   LYMPHO PCT %  --  3*   MONO PCT %  --  6   EOS PCT %  --  0     Results from last 7 days   Lab Units 04/09/25  0520 04/08/25  0451   SODIUM mmol/L 137 138   POTASSIUM mmol/L 3.3* 3.7   CHLORIDE mmol/L 106 108   CO2 mmol/L 23 23   BUN mg/dL 10 18   CREATININE mg/dL  0.95 0.85   ANION GAP mmol/L 8 7   CALCIUM mg/dL 7.6* 7.8*   ALBUMIN g/dL  --  3.9   TOTAL BILIRUBIN mg/dL  --  1.89*   ALK PHOS U/L  --  50   ALT U/L  --  39   AST U/L  --  29   GLUCOSE RANDOM mg/dL 113 116                 Results from last 7 days   Lab Units 04/07/25  2207   LACTIC ACID mmol/L 2.0       Recent Cultures (last 7 days):   Results from last 7 days   Lab Units 04/08/25  0216   BLOOD CULTURE  No Growth at 24 hrs.  No Growth at 24 hrs.       Imaging Results Review: No pertinent imaging studies reviewed.  Other Study Results Review: No additional pertinent studies reviewed.    Last 24 Hours Medication List:     Current Facility-Administered Medications:     acetaminophen (Ofirmev) injection 1,000 mg, Q6H PRN, Last Rate: 1,000 mg (04/08/25 2249)    dicyclomine (BENTYL) capsule 10 mg, 4x Daily (AC & HS)    enoxaparin (LOVENOX) subcutaneous injection 40 mg, Daily    escitalopram (LEXAPRO) tablet 10 mg, Daily    famotidine (PEPCID) tablet 10 mg, BID    gabapentin (NEURONTIN) capsule 400 mg, TID    HYDROmorphone (DILAUDID) injection 1 mg, Q4H PRN    loperamide (IMODIUM) capsule 2 mg, TID PRN    pantoprazole (PROTONIX) injection 40 mg, Q24H EDMOND    [COMPLETED] piperacillin-tazobactam (ZOSYN) IVPB 4.5 g, Once, Last Rate: Stopped (04/08/25 0330) **FOLLOWED BY** piperacillin-tazobactam (ZOSYN) IVPB (EXTENDED INFUSION) 4.5 g, Q8H, Last Rate: 4.5 g (04/09/25 0655)    promethazine (PHENERGAN) injection 25 mg, Q6H PRN    sodium chloride 0.9 % infusion, Continuous, Last Rate: 125 mL/hr (04/09/25 0302)    Administrative Statements   Today, Patient Was Seen By: NIALL Alvarez  I have spent a total time of 45 minutes in caring for this patient on the day of the visit/encounter including Diagnostic results, Risks and benefits of tx options, Instructions for management, Patient and family education, Importance of tx compliance, Risk factor reductions, Impressions, Counseling / Coordination of care, Documenting in  the medical record, Reviewing/placing orders in the medical record (including tests, medications, and/or procedures), and Communicating with other healthcare professionals .    **Please Note: This note may have been constructed using a voice recognition system.**

## 2025-04-09 NOTE — PLAN OF CARE
Problem: PAIN - ADULT  Goal: Verbalizes/displays adequate comfort level or baseline comfort level  Description: Interventions:- Encourage patient to monitor pain and request assistance- Assess pain using appropriate pain scale- Administer analgesics based on type and severity of pain and evaluate response- Implement non-pharmacological measures as appropriate and evaluate response- Consider cultural and social influences on pain and pain management- Notify physician/advanced practitioner if interventions unsuccessful or patient reports new pain  Outcome: Progressing     Problem: INFECTION - ADULT  Goal: Absence or prevention of progression during hospitalization  Description: INTERVENTIONS:- Assess and monitor for signs and symptoms of infection- Monitor lab/diagnostic results- Monitor all insertion sites, i.e. indwelling lines, tubes, and drains- Monitor endotracheal if appropriate and nasal secretions for changes in amount and color- [place] appropriate cooling/warming therapies per order- Administer medications as ordered- Instruct and encourage patient and family to use good hand hygiene technique- Identify and instruct in appropriate isolation precautions for identified infection/condition  Outcome: Progressing  Goal: Absence of fever/infection during neutropenic period  Description: INTERVENTIONS:- Monitor WBC  Outcome: Progressing     Problem: SAFETY ADULT  Goal: Patient will remain free of falls  Description: INTERVENTIONS:- Educate patient/family on patient safety including physical limitations- Instruct patient to call for assistance with activity - Consult OT/PT to assist with strengthening/mobility - Keep Call bell within reach- Keep bed low and locked with side rails adjusted as appropriate- Keep care items and personal belongings within reach- Initiate and maintain comfort rounds- Make Fall Risk Sign visible to staff- Offer Toileting every   Outcome: Progressing  Goal: Maintain or return to  baseline ADL function  Description: INTERVENTIONS:-  Assess patient's ability to carry out ADLs; assess patient's baseline for ADL function and identify physical deficits which impact ability to perform ADLs (bathing, care of mouth/teeth, toileting, grooming, dressing, etc.)- Assess/evaluate cause of self-care deficits - Assess range of motion- Assess patient's mobility; develop plan if impaired- Assess patient's need for assistive devices and provide as appropriate- Encourage maximum independence but intervene and supervise when necessary- Involve family in performance of ADLs- Assess for home care needs following discharge - Consider OT consult to assist with ADL evaluation and planning for discharge- Provide patient education as appropriate  Outcome: Progressing  Goal: Maintains/Returns to pre admission functional level  Description: INTERVENTIONS:- Perform AM-PAC 6 Click Basic Mobility/ Daily Activity assessment daily.- Set and communicate daily mobility goal to care team and patient/family/caregiver. - Collaborate with rehabilitation services on mobility goals if consulted- Perform Range of Motion      Problem: DISCHARGE PLANNING  Goal: Discharge to home or other facility with appropriate resources  Description: INTERVENTIONS:- Identify barriers to discharge w/patient and caregiver- Arrange for needed discharge resources and transportation as appropriate- Identify discharge learning needs (meds, wound care, etc.)- Arrange for interpretive services to assist at discharge as needed- Refer to Case Management Department for coordinating discharge planning if the patient needs post-hospital services based on physician/advanced practitioner order or complex needs related to functional status, cognitive ability, or social support system  Outcome: Progressing     Problem: Knowledge Deficit  Goal: Patient/family/caregiver demonstrates understanding of disease process, treatment plan, medications, and discharge  instructions  Description: Complete learning assessment and assess knowledge base.Interventions:- Provide teaching at level of understanding- Provide teaching via preferred learning methods  Outcome: Progressing

## 2025-04-09 NOTE — ASSESSMENT & PLAN NOTE
Patient presented to the ED with complaints of nausea/vomiting, diarrhea with diffuse generalized abdominal pain x 1-2 days  CT Abd/Pelvis (4/7/25): Findings above which may reflect enteritis in the appropriate clinical setting. Colonic diverticulosis without evidence of acute diverticulitis.  Bacterial stool panel negative  Still reporting diarrhea; with still ongoing generalized abdominal discomfort but no vomiting  Will initiate Bentyl QID for now  If patient should persist with symptoms, will consult GI on 4/10

## 2025-04-09 NOTE — ASSESSMENT & PLAN NOTE
Evident by leukocytosis 17 and heart rate 99 upon presentation to the ED in setting of suspected enteritis, dehydration  Resolved

## 2025-04-10 ENCOUNTER — APPOINTMENT (INPATIENT)
Dept: CT IMAGING | Facility: HOSPITAL | Age: 50
DRG: 872 | End: 2025-04-10
Payer: COMMERCIAL

## 2025-04-10 PROBLEM — R11.2 NAUSEA AND VOMITING: Status: RESOLVED | Noted: 2025-04-08 | Resolved: 2025-04-10

## 2025-04-10 PROBLEM — E86.0 DEHYDRATION: Status: RESOLVED | Noted: 2025-04-08 | Resolved: 2025-04-10

## 2025-04-10 LAB
ALBUMIN SERPL BCG-MCNC: 4.1 G/DL (ref 3.5–5)
ALP SERPL-CCNC: 61 U/L (ref 34–104)
ALT SERPL W P-5'-P-CCNC: 95 U/L (ref 7–52)
ANION GAP SERPL CALCULATED.3IONS-SCNC: 7 MMOL/L (ref 4–13)
AST SERPL W P-5'-P-CCNC: 58 U/L (ref 13–39)
BILIRUB DIRECT SERPL-MCNC: 0.19 MG/DL (ref 0–0.2)
BILIRUB SERPL-MCNC: 0.97 MG/DL (ref 0.2–1)
BUN SERPL-MCNC: 6 MG/DL (ref 5–25)
CALCIUM SERPL-MCNC: 8.7 MG/DL (ref 8.4–10.2)
CHLORIDE SERPL-SCNC: 109 MMOL/L (ref 96–108)
CO2 SERPL-SCNC: 25 MMOL/L (ref 21–32)
CREAT SERPL-MCNC: 0.82 MG/DL (ref 0.6–1.3)
CRP SERPL QL: 18.2 MG/L
ERYTHROCYTE [DISTWIDTH] IN BLOOD BY AUTOMATED COUNT: 13.3 % (ref 11.6–15.1)
GFR SERPL CREATININE-BSD FRML MDRD: 103 ML/MIN/1.73SQ M
GLUCOSE SERPL-MCNC: 90 MG/DL (ref 65–140)
HCT VFR BLD AUTO: 47.7 % (ref 36.5–49.3)
HGB BLD-MCNC: 15.7 G/DL (ref 12–17)
MAGNESIUM SERPL-MCNC: 2.2 MG/DL (ref 1.9–2.7)
MCH RBC QN AUTO: 29.8 PG (ref 26.8–34.3)
MCHC RBC AUTO-ENTMCNC: 32.9 G/DL (ref 31.4–37.4)
MCV RBC AUTO: 91 FL (ref 82–98)
PLATELET # BLD AUTO: 247 THOUSANDS/UL (ref 149–390)
PMV BLD AUTO: 11.6 FL (ref 8.9–12.7)
POTASSIUM SERPL-SCNC: 3.5 MMOL/L (ref 3.5–5.3)
PROT SERPL-MCNC: 6.4 G/DL (ref 6.4–8.4)
RBC # BLD AUTO: 5.26 MILLION/UL (ref 3.88–5.62)
SODIUM SERPL-SCNC: 141 MMOL/L (ref 135–147)
WBC # BLD AUTO: 3.11 THOUSAND/UL (ref 4.31–10.16)

## 2025-04-10 PROCEDURE — 86140 C-REACTIVE PROTEIN: CPT | Performed by: PHYSICIAN ASSISTANT

## 2025-04-10 PROCEDURE — 74177 CT ABD & PELVIS W/CONTRAST: CPT

## 2025-04-10 PROCEDURE — 83735 ASSAY OF MAGNESIUM: CPT | Performed by: NURSE PRACTITIONER

## 2025-04-10 PROCEDURE — 99222 1ST HOSP IP/OBS MODERATE 55: CPT | Performed by: INTERNAL MEDICINE

## 2025-04-10 PROCEDURE — 87493 C DIFF AMPLIFIED PROBE: CPT | Performed by: PHYSICIAN ASSISTANT

## 2025-04-10 PROCEDURE — 99232 SBSQ HOSP IP/OBS MODERATE 35: CPT | Performed by: NURSE PRACTITIONER

## 2025-04-10 PROCEDURE — 85027 COMPLETE CBC AUTOMATED: CPT | Performed by: NURSE PRACTITIONER

## 2025-04-10 PROCEDURE — 80076 HEPATIC FUNCTION PANEL: CPT | Performed by: NURSE PRACTITIONER

## 2025-04-10 PROCEDURE — 80048 BASIC METABOLIC PNL TOTAL CA: CPT | Performed by: NURSE PRACTITIONER

## 2025-04-10 PROCEDURE — 83993 ASSAY FOR CALPROTECTIN FECAL: CPT | Performed by: PHYSICIAN ASSISTANT

## 2025-04-10 RX ADMIN — ESCITALOPRAM OXALATE 10 MG: 10 TABLET ORAL at 08:36

## 2025-04-10 RX ADMIN — GABAPENTIN 400 MG: 400 CAPSULE ORAL at 22:53

## 2025-04-10 RX ADMIN — LOPERAMIDE HYDROCHLORIDE 2 MG: 2 CAPSULE ORAL at 18:04

## 2025-04-10 RX ADMIN — DICYCLOMINE HYDROCHLORIDE 10 MG: 10 CAPSULE ORAL at 16:41

## 2025-04-10 RX ADMIN — GABAPENTIN 400 MG: 400 CAPSULE ORAL at 16:41

## 2025-04-10 RX ADMIN — FAMOTIDINE 10 MG: 20 TABLET, FILM COATED ORAL at 18:04

## 2025-04-10 RX ADMIN — DICYCLOMINE HYDROCHLORIDE 10 MG: 10 CAPSULE ORAL at 22:53

## 2025-04-10 RX ADMIN — IOHEXOL 100 ML: 350 INJECTION, SOLUTION INTRAVENOUS at 13:14

## 2025-04-10 RX ADMIN — GABAPENTIN 400 MG: 400 CAPSULE ORAL at 08:35

## 2025-04-10 RX ADMIN — PIPERACILLIN AND TAZOBACTAM 4.5 G: 36; 4.5 INJECTION, POWDER, FOR SOLUTION INTRAVENOUS at 00:01

## 2025-04-10 RX ADMIN — FAMOTIDINE 10 MG: 20 TABLET, FILM COATED ORAL at 08:36

## 2025-04-10 RX ADMIN — HYDROMORPHONE HYDROCHLORIDE 1 MG: 1 INJECTION, SOLUTION INTRAMUSCULAR; INTRAVENOUS; SUBCUTANEOUS at 09:08

## 2025-04-10 RX ADMIN — PANTOPRAZOLE SODIUM 40 MG: 40 INJECTION, POWDER, FOR SOLUTION INTRAVENOUS at 08:35

## 2025-04-10 RX ADMIN — LOPERAMIDE HYDROCHLORIDE 2 MG: 2 CAPSULE ORAL at 09:08

## 2025-04-10 RX ADMIN — DICYCLOMINE HYDROCHLORIDE 10 MG: 10 CAPSULE ORAL at 08:35

## 2025-04-10 RX ADMIN — ENOXAPARIN SODIUM 40 MG: 40 INJECTION SUBCUTANEOUS at 08:35

## 2025-04-10 NOTE — CASE MANAGEMENT
Case Management Assessment & Discharge Planning Note    Patient name Cory Sheppard  Location /-01 MRN [id number]  : 1975 Date 4/10/2025       Current Admission Date: 2025  Current Admission Diagnosis:Enteritis   Patient Active Problem List    Diagnosis Date Noted Date Diagnosed    Enteritis 2025     Elevated bilirubin 2025     Sepsis (HCC) 2025     Idiopathic peripheral neuropathy 2025     Psoriatic arthritis (HCC) 2025     Class 1 obesity with body mass index (BMI) of 31.0 to 31.9 in adult 2025     Inflammatory polyarthritis (HCC) 2025     Erosive gastritis 2024     SHAUN (obstructive sleep apnea) 2024     Prediabetes 2024     Medical marijuana use 2019     Essential hypertension 2018     Psoriasis 2018     Anxiety 2018     Irritable bowel syndrome with diarrhea 2018     Chest pain 2018     Injury of brachial plexus 2018     Hemorrhoids, complicated 03/10/2017       LOS (days): 0  Geometric Mean LOS (GMLOS) (days):   Days to GMLOS:     OBJECTIVE:              Current admission status: Inpatient       Preferred Pharmacy:   [id number] [place] - [address]  [address]  [facility] [address]  Phone: [phone] Fax: [phone]    [facility] - [address] [address]  Phone: [phone] Fax: [phone]    Primary Care Provider: Esdras Ya DO    Primary Insurance: [employer]  Secondary Insurance:     ASSESSMENT:  Active Health Care Proxies       Annelise Sheppard Health Care Representative - Spouse   Primary Phone: [phone] (Home)  Mobile Phone: [phone]                 Advance Directives  Does patient have a Health Care POA?: No  Was patient offered paperwork?: Yes (not interested)  Does patient currently have a Health Care decision  maker?: Yes, please see Health Care Proxy section  Does patient have Advance Directives?: No         Readmission Root Cause  30 Day Readmission: No    Patient Information  Admitted from:: Home  Mental Status: Alert  During Assessment patient was accompanied by: Spouse, Other-Comment (Mother)  Assessment information provided by:: Patient  Primary Caregiver: Self  Support Systems: Spouse/significant other  County of Residence: [place]  What city do you live in?: [place]  Home entry access options. Select all that apply.: Stairs  Number of steps to enter home.: 4  Do the steps have railings?: Yes  Type of Current Residence: [facility]  Living Arrangements: Lives w/ Spouse/significant other  Is patient a ?: No    Activities of Daily Living Prior to Admission  Functional Status: Independent  Completes ADLs independently?: Yes  Ambulates independently?: Yes  Does patient use assisted devices?: No  Does patient currently own DME?: No  Does patient have a history of Outpatient Therapy (PT/OT)?: No  Does the patient have a history of Short-Term Rehab?: No  Does patient have a history of HHC?: No  Does patient currently have HHC?: No         Patient Information Continued  Income Source: Employed  Does patient have prescription coverage?: Yes  Can the patient afford their medications and any related supplies (such as glucometers or test strips)?: Yes  Does patient receive dialysis treatments?: No  Does patient have a history of substance abuse?: No  Does patient have a history of Mental Health Diagnosis?: No         Means of Transportation  Means of Transport to Appts:: Drives Self          DISCHARGE DETAILS:    Discharge planning discussed with:: Patient and his wife  Freedom of Choice: Yes  Comments - Freedom of Choice: No anticipated DC needs  CM contacted family/caregiver?: Yes  Were Treatment Team discharge recommendations reviewed with patient/caregiver?: Yes  Did patient/caregiver verbalize understanding of patient  care needs?: Yes  Were patient/caregiver advised of the risks associated with not following Treatment Team discharge recommendations?: Yes    Contacts  Patient Contacts: wife  Relationship to Patient:: Family  Contact Method: In Person  Reason/Outcome: Continuity of Care    Requested Home Health Care         Is the patient interested in HHC at discharge?: No    DME Referral Provided  Referral made for DME?: No    Other Referral/Resources/Interventions Provided:  Referral Comments: No anticipated DC needs         Treatment Team Recommendation: Home  Discharge Destination Plan:: Home  Transport at Discharge : Family

## 2025-04-10 NOTE — ASSESSMENT & PLAN NOTE
Evident by leukocytosis 17 and heart rate 99 upon presentation to the ED in setting of suspected enteritis, dehydration  Blood cultures negative x 48 hours  Was maintained on IV Zosyn, will discontinue and monitor  Stool for bacteria was negative  Resolved

## 2025-04-10 NOTE — PROGRESS NOTES
Progress Note - Hospitalist   Name: Cory Sheppard 49 y.o. male I MRN: [id number]  Unit/Bed#: [facility] I Date of Admission: 4/7/2025   Date of Service: 4/10/2025 I Hospital Day: 0    Assessment & Plan  Enteritis  Patient presented to the ED with complaints of nausea/vomiting, diarrhea with diffuse generalized abdominal pain x 1-2 days  CT Abd/Pelvis (4/7/25): Findings above which may reflect enteritis in the appropriate clinical setting. Colonic diverticulosis without evidence of acute diverticulitis.  Bacterial stool panel negative  Continue Bentyl QID  Still reports ongoing diarrhea, ongoing abdominal discomfort when he eats any solid foods.  Will ask GI to evaluate  Sepsis (HCC)  Evident by leukocytosis 17 and heart rate 99 upon presentation to the ED in setting of suspected enteritis, dehydration  Blood cultures negative x 48 hours  Was maintained on IV Zosyn, will discontinue and monitor  Stool for bacteria was negative  Resolved  Elevated bilirubin  Likely reactive  Add LFTs to AM labs today  Nausea and vomiting (Resolved: 4/10/2025)  Resolved  Dehydration (Resolved: 4/10/2025)  Resolved    VTE Pharmacologic Prophylaxis:    Enoxaparin    Mobility:   Basic Mobility Inpatient Raw Score: 24  JH-HLM Goal: 8: Walk 250 feet or more  JH-HLM Achieved: 8: Walk 250 feet ot more  JH-HLM Goal achieved. Continue to encourage appropriate mobility.    Patient Centered Rounds: I performed bedside rounds with nursing staff today.   Discussions with Specialists or Other Care Team Provider: Case Management, GI    Education and Discussions with Family / Patient: Patient declined call to .     Current Length of Stay: 0 day(s)  Current Patient Status: Observation   Certification Statement: The patient will continue to require additional inpatient hospital stay due to ongoing treatment in setting of not improved abdominal symptoms, need for GI eval.  Discharge Plan: Anticipate discharge tomorrow to home.    Code  Status: Level 1 - Full Code    Subjective   Patient resting in bed, denies complaints of chest pain, shortness of breath but reports ongoing abdominal discomfort although improved since coming in.  He reports any time he eats anything solid, he has discomfort.  Can't say if theres any improvement with Bentyl.    Objective :  Temp:  [97.5 °F (36.4 °C)-98.9 °F (37.2 °C)] 98 °F (36.7 °C)  HR:  [66-88] 67  BP: (118-154)/(80-91) 154/85  Resp:  [16-18] 18  SpO2:  [93 %-99 %] 99 %  O2 Device: None (Room air)    Body mass index is 30.61 kg/m².     Input and Output Summary (last 24 hours):     Intake/Output Summary (Last 24 hours) at 4/10/2025 1122  Last data filed at 4/10/2025 0901  Gross per 24 hour   Intake 1160 ml   Output 500 ml   Net 660 ml       Physical Exam  Vitals and nursing note reviewed.   Constitutional:       General: He is not in acute distress.     Appearance: He is ill-appearing.   Cardiovascular:      Rate and Rhythm: Normal rate.      Pulses: Normal pulses.   Pulmonary:      Effort: Pulmonary effort is normal.   Abdominal:      General: Bowel sounds are normal. There is no distension.      Palpations: Abdomen is soft.      Tenderness: There is no abdominal tenderness.   Musculoskeletal:         General: Normal range of motion.   Skin:     General: Skin is warm and dry.   Neurological:      Mental Status: He is alert and oriented to person, place, and time.   Psychiatric:         Mood and Affect: Mood normal.           Lines/Drains:              Lab Results: I have reviewed the following results:   Results from last 7 days   Lab Units 04/10/25  0458 04/09/25  0520 04/08/25  0451   WBC Thousand/uL 3.11*   < > 10.89*   HEMOGLOBIN g/dL 15.7   < > 15.5   HEMATOCRIT % 47.7   < > 45.9   PLATELETS Thousands/uL 247   < > 270   SEGS PCT %  --   --  90*   LYMPHO PCT %  --   --  3*   MONO PCT %  --   --  6   EOS PCT %  --   --  0    < > = values in this interval not displayed.     Results from last 7 days   Lab Units  04/10/25  0458 04/09/25  0520 04/08/25  0451   SODIUM mmol/L 141   < > 138   POTASSIUM mmol/L 3.5   < > 3.7   CHLORIDE mmol/L 109*   < > 108   CO2 mmol/L 25   < > 23   BUN mg/dL 6   < > 18   CREATININE mg/dL 0.82   < > 0.85   ANION GAP mmol/L 7   < > 7   CALCIUM mg/dL 8.7   < > 7.8*   ALBUMIN g/dL  --   --  3.9   TOTAL BILIRUBIN mg/dL  --   --  1.89*   ALK PHOS U/L  --   --  50   ALT U/L  --   --  39   AST U/L  --   --  29   GLUCOSE RANDOM mg/dL 90   < > 116    < > = values in this interval not displayed.                 Results from last 7 days   Lab Units 04/07/25  2207   LACTIC ACID mmol/L 2.0       Recent Cultures (last 7 days):   Results from last 7 days   Lab Units 04/08/25  0216   BLOOD CULTURE  No Growth at 48 hrs.  No Growth at 48 hrs.       Imaging Results Review: No pertinent imaging studies reviewed.  Other Study Results Review: No additional pertinent studies reviewed.    Last 24 Hours Medication List:     Current Facility-Administered Medications:     dicyclomine (BENTYL) capsule 10 mg, 4x Daily (AC & HS)    enoxaparin (LOVENOX) subcutaneous injection 40 mg, Daily    escitalopram (LEXAPRO) tablet 10 mg, Daily    famotidine (PEPCID) tablet 10 mg, BID    gabapentin (NEURONTIN) capsule 400 mg, TID    HYDROmorphone (DILAUDID) injection 1 mg, Q4H PRN    loperamide (IMODIUM) capsule 2 mg, TID PRN    pantoprazole (PROTONIX) injection 40 mg, Q24H EDMOND    promethazine (PHENERGAN) injection 25 mg, Q6H PRN    Administrative Statements   Today, Patient Was Seen By: NIALL Alvarez  I have spent a total time of 32 minutes in caring for this patient on the day of the visit/encounter including Diagnostic results, Risks and benefits of tx options, Instructions for management, Patient and family education, Importance of tx compliance, Risk factor reductions, Impressions, Counseling / Coordination of care, Documenting in the medical record, Reviewing/placing orders in the medical record (including tests,  medications, and/or procedures), and Communicating with other healthcare professionals .    **Please Note: This note may have been constructed using a voice recognition system.**

## 2025-04-10 NOTE — CONSULTS
Consultation - Gastroenterology   Name: Cory Sheppard 49 y.o. male I MRN: [id number]  Unit/Bed#: MS [facility] I Date of Admission: 4/7/2025   Date of Service: 4/10/2025 I Hospital Day: 0   Inpatient consult to gastroenterology  Consult performed by: Judith Franco PA-C  Consult ordered by: NIALL Alvarez        Physician Requesting Evaluation: Karla Esquivel MD   Reason for Evaluation / Principal Problem: Enteritis    Assessment & Plan  Enteritis  Patient with diarrhea for over 2 months with enteritis on admission, 2 months previous to this had mesenteric lymphadenopathy seen in 2023.  Patient already has history of autoimmune disease from rheumatoid arthritis and psoriatic arthritis.  States that he has failed several Biologics, and is currently on Remicade.  Is due for a dose 2 days after Easter.  Typically Crohn's disease would not have a diffuse appearance.  Recent colonoscopy did not reveal changes suggesting IBD.  -Plan for CT enterography  -CRP, fecal calprotectin  -Continue supportive care  -If CT enterography is negative, would recommend treatment outpatient with Xifaxan for postinfectious irritable bowel syndrome  I have discussed the above management plan in detail with the primary service.  Patient will be seen and examined by Dr. Guidry, all medical decisions are made with Dr. Guidry.    History of Present Illness   HPI:  Cory Sheppard is a 49 y.o. male history of IBS, GERD, rheumatoid arthritis and psoriatic arthritis on infliximab biosimilar.  Patient reports that he has failed multiple Biologics including Humira, Xeljanz, and Cimzia.  Follows with rheumatologist in [place].  Patient presented to the emergency room for persistent abdominal pain and diarrhea.  Patient reports that his symptoms overall started about 2 months ago now.  Initial CT scan performed in February 2025 revealed no acute abdominal pathology.  Previously seen perigastric inflammatory fat stranding  "and mesenteric lymphadenopathy resolved from 2023.  CT on this admission revealing: \" Several mildly fluid distended loops of small bowel are seen in the lower abdomen and pelvis, nonspecific. No evidence for bowel obstruction. Colonic diverticulosis without evidence for acute diverticulitis. Long segment wall thickening involving the sigmoid colon likely on the basis of chronic diverticular disease.\"  No leukocytosis, hemoglobin 15.7, lipase 51, lactic acid within normal range.  Blood cultures negative x 48 hours.    Patient denies signs or GI bleeding when asked.  There is no family history of IBD to his knowledge.    Review of Systems   Constitutional:  Negative for appetite change, chills, diaphoresis, fatigue and unexpected weight change.   HENT:  Negative for sore throat and trouble swallowing.    Eyes:  Negative for discharge and redness.   Respiratory:  Negative for cough, shortness of breath and wheezing.    Cardiovascular:  Negative for chest pain and palpitations.   Gastrointestinal:  Positive for abdominal distention, abdominal pain and diarrhea. Negative for anal bleeding, blood in stool, constipation, nausea, rectal pain and vomiting.   Endocrine: Negative for cold intolerance and heat intolerance.   Musculoskeletal:  Negative for joint swelling and myalgias.   Skin:  Negative for pallor and rash.   Neurological:  Negative for dizziness, tremors, weakness, light-headedness, numbness and headaches.   Hematological:  Negative for adenopathy. Does not bruise/bleed easily.   Psychiatric/Behavioral:  Negative for behavioral problems, confusion, dysphoric mood and sleep disturbance. The patient is not nervous/anxious.      Medical History Review: I have reviewed the patient's PMH, PSH, Social History, Family History, Meds, and Allergies     Objective :  Temp:  [97.5 °F (36.4 °C)-98.9 °F (37.2 °C)] 98 °F (36.7 °C)  HR:  [66-88] 67  BP: (118-154)/(80-91) 154/85  Resp:  [16-18] 18  SpO2:  [93 %-99 %] 99 %  O2 " Device: None (Room air)    Physical Exam  Constitutional:       General: He is not in acute distress.     Appearance: He is well-developed. He is not diaphoretic.   HENT:      Head: Normocephalic and atraumatic.   Eyes:      General: No scleral icterus.     Conjunctiva/sclera: Conjunctivae normal.      Pupils: Pupils are equal, round, and reactive to light.   Neck:      Thyroid: No thyromegaly.      Trachea: No tracheal deviation.   Cardiovascular:      Rate and Rhythm: Normal rate and regular rhythm.   Pulmonary:      Effort: Pulmonary effort is normal.      Breath sounds: Normal breath sounds. No wheezing or rales.   Abdominal:      General: There is no distension.      Palpations: Abdomen is soft. Abdomen is not rigid. There is no mass.      Tenderness: There is no abdominal tenderness. There is no guarding or rebound.   Musculoskeletal:      Cervical back: Neck supple.   Lymphadenopathy:      Cervical: No cervical adenopathy.   Skin:     General: Skin is warm and dry.      Findings: No erythema or rash.   Neurological:      Mental Status: He is alert and oriented to person, place, and time.   Psychiatric:         Behavior: Behavior normal.         Lab Results: I have reviewed the following results:

## 2025-04-10 NOTE — UTILIZATION REVIEW
Initial Clinical Review    Admission: Date/Time/Statement:     OBS order 4/8 0112 converted to IP on 4/10 1207 for cont mngt of enteritis /abd pain requiring bentyl, IV pain control , IV PPI .    Admission Orders (From admission, onward)       Ordered        04/10/25 1207  INPATIENT ADMISSION  Once            04/08/25 0112  Place in Observation  Once                           INPATIENT ADMISSION     Standing Status:   Standing     Number of Occurrences:   1     Level of Care:   Med Surg [16]     Estimated length of stay:   More than 2 Midnights     Certification:   I certify that inpatient services are medically necessary for this patient for a duration of greater than two midnights. See H&P and MD Progress Notes for additional information about the patient's course of treatment.     ED Arrival Information       Expected   -    Arrival   4/7/2025 21:21    Acuity   Urgent              Means of arrival   Walk-In    Escorted by   Spouse    Service   Hospitalist    Admission type   Emergency              Arrival complaint   Abd Pain, Diarrhea, Vomitng             Chief Complaint   Patient presents with    Abdominal Pain     Pt states lower abd pain for awhile but worsened today with n/v/d       Initial Presentation: 49 y.o. male to ED from home w/   PMH of hemorrhoids, GERD, psoriasis, migraine who presents with acute abdominal pain nausea vomiting diarrhea X 1 to 2 days . Imagining concerning for enteritis . + leukocytosis , no fever. Admitted OBS status w/ enteritis plan for IV F , zoyn , f/u BC , stool cx, Cdiff , prn phenergan , pain meds , hold asa, consider GI consult . Dehydration IVF .     Anticipated Length of Stay/Certification Statement:   Patient will be admitted on an inpatient basis with an anticipated length of stay of greater than 2 midnights secondary to N/V/Abd Pain management as above .     4/8 IM Note   reporting poor appetite has been drinking some but when eating having increased abdominal pain  reports diarrhea has stopped. + abd tenderness     4/9 IM Note   reports that he is doing fine with liquids but any time he eats something more solid, he starts having abdominal pain, and diarrhea. Reported 3 Bms from wake up to 10:30 am this morning. Wbc improved to 7.61 from 10.89 . Cont IV unasyn     4/10 IM Note   Cont bentyl QID . Still reports ongoing abd discomfort . BC neg x48hrs . Stool bacteria was neg . Add LFTs to am labs . Wbc dec to 3.11 from 10.89 .     ED Treatment-Medication Administration from 04/07/2025 2120 to 04/08/2025 1314         Date/Time Order Dose Route Action     04/07/2025 2229 sodium chloride 0.9 % bolus 1,000 mL 1,000 mL Intravenous New Bag     04/07/2025 2223 ondansetron (ZOFRAN) injection 4 mg 4 mg Intravenous Given     04/07/2025 2224 fentaNYL injection 50 mcg 50 mcg Intravenous Given     04/07/2025 2232 Famotidine (PF) (PEPCID) injection 20 mg 20 mg Intravenous Given     04/07/2025 2226 pantoprazole (PROTONIX) injection 40 mg 40 mg Intravenous Given     04/07/2025 2315 iohexol (OMNIPAQUE) 350 MG/ML injection (MULTI-DOSE) 100 mL 100 mL Intravenous Given     04/08/2025 0024 fentaNYL injection 50 mcg 50 mcg Intravenous Given     04/08/2025 0103 metoclopramide (REGLAN) injection 10 mg 10 mg Intravenous Given     04/08/2025 0102 diphenhydrAMINE (BENADRYL) injection 12.5 mg 12.5 mg Intravenous Given     04/08/2025 0218 piperacillin-tazobactam (ZOSYN) IVPB 4.5 g 4.5 g Intravenous New Bag     04/08/2025 0607 piperacillin-tazobactam (ZOSYN) IVPB (EXTENDED INFUSION) 4.5 g 4.5 g Intravenous New Bag     04/08/2025 0333 sodium chloride 0.9 % infusion 125 mL/hr Intravenous New Bag     04/08/2025 0338 promethazine (PHENERGAN) injection 25 mg 25 mg Intramuscular Given     04/08/2025 0343 pantoprazole (PROTONIX) injection 40 mg 40 mg Intravenous Given     04/08/2025 0830 escitalopram (LEXAPRO) tablet 10 mg 10 mg Oral Given     04/08/2025 0830 famotidine (PEPCID) tablet 10 mg 10 mg Oral Given      04/08/2025 0830 gabapentin (NEURONTIN) capsule 400 mg 400 mg Oral Given     04/08/2025 0830 enoxaparin (LOVENOX) subcutaneous injection 40 mg 40 mg Subcutaneous Given     04/08/2025 0337 HYDROmorphone (DILAUDID) injection 1 mg 1 mg Intravenous Given     04/08/2025 1020 HYDROmorphone (DILAUDID) injection 1 mg 1 mg Intravenous Given            Scheduled Medications:  dicyclomine, 10 mg, Oral, 4x Daily (AC & HS)  enoxaparin, 40 mg, Subcutaneous, Daily  escitalopram, 10 mg, Oral, Daily  famotidine, 10 mg, Oral, BID  gabapentin, 400 mg, Oral, TID  pantoprazole, 40 mg, Intravenous, Q24H EDMOND      Continuous IV Infusions:     PRN Meds:  HYDROmorphone, 1 mg, Intravenous, Q4H PRN  4/8 x4 4/9 x2 4/10 x1  loperamide, 2 mg, Oral, TID PRN  promethazine, 25 mg, Intramuscular, Q6H PRN      ED Triage Vitals   Temperature Pulse Respirations Blood Pressure SpO2 Pain Score   04/07/25 2125 04/07/25 2125 04/07/25 2125 04/07/25 2126 04/07/25 2125 04/08/25 0024   97.8 °F (36.6 °C) 99 18 (!) 168/102 100 % 8     Weight (last 2 days)       Date/Time Weight    04/08/25 1500 105 (232)            Vital Signs (last 3 days)       Date/Time Temp Pulse Resp BP MAP (mmHg) SpO2 O2 Device Patient Position - Orthostatic VS [place] Coma Scale Score Pain    04/10/25 0908 -- 67 18 -- -- 99 % None (Room air) -- -- 7    04/10/25 08:06:44 98 °F (36.7 °C) 71 -- 154/85 108 98 % -- -- -- --    04/10/25 0120 -- -- -- -- -- 93 % None (Room air) -- 15 7    04/09/25 22:33:14 98.9 °F (37.2 °C) 69 18 133/83 100 97 % -- -- -- --    04/09/25 18:58:40 -- 75 18 118/86 97 96 % None (Room air) Sitting -- --    04/09/25 1856 -- 73 -- -- -- -- -- -- -- --    04/09/25 1851 -- 75 -- -- -- 96 % None (Room air) -- -- 7    04/09/25 16:52:41 98.4 °F (36.9 °C) 66 -- -- -- 96 % None (Room air) -- -- --    04/09/25 15:02:08 97.5 °F (36.4 °C) 70 18 134/80 98 96 % None (Room air) Sitting -- --    04/09/25 12:33:49 -- 77 16 137/91 106 96 % -- Sitting -- --    04/09/25 1231 -- 88 -- -- --  98 % None (Room air) -- -- 7    04/09/25 08:29:34 98.3 °F (36.8 °C) 87 -- 131/81 98 94 % -- -- -- --    04/09/25 0258 -- -- -- -- -- 90 % None (Room air) -- 15 10 - Worst Possible Pain    04/08/25 22:28:16 100.1 °F (37.8 °C) 90 -- 131/77 95 95 % -- -- -- --    04/08/25 2028 -- -- -- -- -- -- -- -- -- 10 - Worst Possible Pain    04/08/25 1700 -- -- -- -- -- 94 % -- -- -- --    04/08/25 1554 -- 83 -- -- -- 95 % None (Room air) -- -- 8    04/08/25 1500 -- -- -- -- -- 93 % -- -- -- --    04/08/25 1428 -- -- -- -- -- 96 % None (Room air) -- -- --    04/08/25 13:18:04 97.5 °F (36.4 °C) 72 20 139/82 101 98 % None (Room air) Lying -- 8    04/08/25 1318 -- -- -- -- -- -- None (Room air) Lying -- --    04/08/25 1300 -- -- -- -- -- 96 % -- -- -- --    04/08/25 1200 -- 69 16 104/60 76 95 % -- -- -- --    04/08/25 1130 -- 72 16 105/59 75 94 % -- -- -- --    04/08/25 1030 -- 76 16 116/64 83 91 % -- -- -- --    04/08/25 1020 -- -- -- -- -- -- -- -- -- 8    04/08/25 1000 -- 81 17 113/66 83 93 % -- -- -- --    04/08/25 0930 -- 77 17 114/69 87 94 % -- -- -- --    04/08/25 0900 -- 89 17 123/74 94 96 % -- -- -- --    04/08/25 0830 -- 82 17 112/69 86 96 % -- -- -- --    04/08/25 0800 -- 73 17 105/67 81 95 % -- -- -- --    04/08/25 0730 -- 73 17 111/65 81 94 % -- -- -- --    04/08/25 0700 -- 77 17 112/71 86 94 % -- -- -- --    04/08/25 0630 -- 76 17 126/62 88 94 % -- -- -- --    04/08/25 0530 -- 85 17 108/65 80 92 % -- -- -- --    04/08/25 0500 -- 85 17 100/63 76 93 % -- -- -- --    04/08/25 0430 -- 85 17 101/69 78 94 % -- -- -- --    04/08/25 0400 -- 82 17 108/71 85 93 % -- -- -- --    04/08/25 0337 -- -- -- -- -- -- -- -- -- 8    04/08/25 0330 -- 88 17 128/71 86 95 % -- -- -- --    04/08/25 0300 -- 92 18 128/76 97 95 % -- -- -- --    04/08/25 0230 -- 92 18 128/78 98 94 % -- -- -- --    04/08/25 0200 -- 93 18 130/98 109 95 % -- -- -- --    04/08/25 0130 -- 91 -- 130/84 102 95 % None (Room air) -- -- --    04/08/25 0100 -- 95 -- 141/86  108 93 % None (Room air) Sitting -- --    04/08/25 0030 -- 95 18 132/84 103 96 % None (Room air) Sitting -- --    04/08/25 0024 -- -- -- -- -- -- -- -- -- 8    04/08/25 0000 -- 94 18 132/82 99 96 % None (Room air) Sitting -- --    04/07/25 2241 -- -- -- -- -- -- -- -- 15 --    04/07/25 2126 -- -- -- 168/102 118 100 % -- -- -- --    04/07/25 2125 97.8 °F (36.6 °C) 99 18 -- -- 100 % None (Room air) Sitting -- --              Pertinent Labs/Diagnostic Test Results:   Radiology:  CT abdomen pelvis with contrast   Final Interpretation by Sumeet Mckeon MD (04/08 0044)      Findings above which may reflect enteritis in the appropriate clinical setting.      Colonic diverticulosis without evidence of acute diverticulitis.         Workstation performed: [id number]         CT small bowel enterography    (Results Pending)     Cardiology:  No orders to display     GI:  No orders to display           Results from last 7 days   Lab Units 04/10/25  0458 04/09/25  0520 04/08/25  0451 04/07/25  2207   WBC Thousand/uL 3.11* 7.61 10.89* 17.66*   HEMOGLOBIN g/dL 15.7 15.6 15.5 17.4*   HEMATOCRIT % 47.7 46.7 45.9 50.9*   PLATELETS Thousands/uL 247 235 270 296   TOTAL NEUT ABS Thousands/µL  --   --  9.80* 16.15*         Results from last 7 days   Lab Units 04/10/25  0458 04/09/25  0520 04/08/25  0451 04/07/25  2207   SODIUM mmol/L 141 137 138 137   POTASSIUM mmol/L 3.5 3.3* 3.7 3.7   CHLORIDE mmol/L 109* 106 108 105   CO2 mmol/L 25 23 23 21   ANION GAP mmol/L 7 8 7 11   BUN mg/dL 6 10 18 20   CREATININE mg/dL 0.82 0.95 0.85 0.84   EGFR ml/min/1.73sq m 103 92 101 102   CALCIUM mg/dL 8.7 7.6* 7.8* 8.9   MAGNESIUM mg/dL 2.2  --   --   --      Results from last 7 days   Lab Units 04/08/25 0451 04/07/25  2207   AST U/L 29 33   ALT U/L 39 46   ALK PHOS U/L 50 64   TOTAL PROTEIN g/dL 6.1* 7.6   ALBUMIN g/dL 3.9 4.8   TOTAL BILIRUBIN mg/dL 1.89* 1.85*         Results from last 7 days   Lab Units 04/10/25  0458 04/09/25  0520 04/08/25  0451  "04/07/25  2207   GLUCOSE RANDOM mg/dL 90 113 116 139             No results found for: \"BETA-HYDROXYBUTYRATE\"                                       Results from last 7 days   Lab Units 04/07/25  2207   LACTIC ACID mmol/L 2.0                                 Results from last 7 days   Lab Units 04/07/25  2207   LIPASE u/L 51     Results from last 7 days   Lab Units 04/10/25  0458   CRP mg/L 18.2*             Results from last 7 days   Lab Units 04/07/25  2354   CLARITY UA  Clear   COLOR UA  Light Yellow   SPEC GRAV UA  >=1.050*   PH UA  6.5   GLUCOSE UA mg/dl Negative   KETONES UA mg/dl 20 (1+)*   BLOOD UA  Negative   PROTEIN UA mg/dl Negative   NITRITE UA  Negative   BILIRUBIN UA  Negative   UROBILINOGEN UA (BE) mg/dl <2.0   LEUKOCYTES UA  Negative                         Results from last 7 days   Lab Units 04/08/25  1529   SALMONELLA SP PCR  Negative   SHIGELLA SP/ENTEROINVASIVE E. COLI (EIEC)  Negative   CAMPYLOBACTER SP (JEJUNI AND COLI)  Negative   SHIGA TOXIN 1/SHIGA TOXIN 2  Negative         Results from last 7 days   Lab Units 04/08/25  0216   BLOOD CULTURE  No Growth at 48 hrs.  No Growth at 48 hrs.                   Past Medical History:   Diagnosis Date    Arthritis     Chest pain     Colon polyp     GERD (gastroesophageal reflux disease)     Hemorrhoids, complicated 03/10/2017    Kidney stone     Kidney stones     Migraine     Psoriasis     Psoriatic arthritis (HCC)      Present on Admission:  **None**      Admitting Diagnosis: Diarrhea [R19.7]  Enteritis [K52.9]  Abdominal pain [R10.9]  Nausea and vomiting [R11.2]  Age/Sex: 49 y.o. male    Network Utilization Review Department  ATTENTION: Please call with any questions or concerns to [phone] and carefully listen to the prompts so that you are directed to the right person. All voicemails are confidential.   For Discharge needs, contact Care Management DC Support Team at [phone] opt. [phone]  Send all requests for admission clinical reviews, approved " or denied determinations and any other requests to dedicated fax number below belonging to the campus where the patient is receiving treatment. List of dedicated fax numbers for the Facilities:  FACILITY NAME UR FAX NUMBER   ADMISSION DENIALS (Administrative/Medical Necessity) [phone]   DISCHARGE SUPPORT TEAM (NETWORK) [phone]   PARENT CHILD HEALTH (Maternity/NICU/Pediatrics) [phone]   [facility] [phone]   [facility] [phone]   [facility] [phone]   [facility] [phone]   [facility] [phone]   [facility] [phone]   [facility] [phone]   [facility] [phone]   [facility] [phone]   [facility] [phone]   [facility] [phone]   [facility] [phone]

## 2025-04-10 NOTE — ASSESSMENT & PLAN NOTE
Patient presented to the ED with complaints of nausea/vomiting, diarrhea with diffuse generalized abdominal pain x 1-2 days  CT Abd/Pelvis (4/7/25): Findings above which may reflect enteritis in the appropriate clinical setting. Colonic diverticulosis without evidence of acute diverticulitis.  Bacterial stool panel negative  Continue [place] WISE  Still reports ongoing diarrhea, ongoing abdominal discomfort when he eats any solid foods.  Will ask GI to evaluate

## 2025-04-10 NOTE — PLAN OF CARE
Problem: PAIN - ADULT  Goal: Verbalizes/displays adequate comfort level or baseline comfort level  Description: Interventions:- Encourage patient to monitor pain and request assistance- Assess pain using appropriate pain scale- Administer analgesics based on type and severity of pain and evaluate response- Implement non-pharmacological measures as appropriate and evaluate response- Consider cultural and social influences on pain and pain management- Notify physician/advanced practitioner if interventions unsuccessful or patient reports new pain  Outcome: Progressing     Problem: INFECTION - ADULT  Goal: Absence or prevention of progression during hospitalization  Description: INTERVENTIONS:- Assess and monitor for signs and symptoms of infection- Monitor lab/diagnostic results- Monitor all insertion sites, i.e. indwelling lines, tubes, and drains- Monitor endotracheal if appropriate and nasal secretions for changes in amount and color- [place] appropriate cooling/warming therapies per order- Administer medications as ordered- Instruct and encourage patient and family to use good hand hygiene technique- Identify and instruct in appropriate isolation precautions for identified infection/condition  Outcome: Progressing  Goal: Absence of fever/infection during neutropenic period  Description: INTERVENTIONS:- Monitor WBC  Outcome: Progressing     Problem: SAFETY ADULT  Goal: Patient will remain free of falls  Description: INTERVENTIONS:- Educate patient/family on patient safety including physical limitations- Instruct patient to call for assistance with activity - Consult OT/PT to assist with strengthening/mobility - Keep Call bell within reach- Keep bed low and locked with side rails adjusted as appropriate- Keep care items and personal belongings within reach- Initiate and maintain comfort rounds- Make Fall Risk Sign visible to staff- Offer Toileting every 2 Hours, in advance of need- Initiate/Maintain 2alarm-  Obtain necessary fall risk management equipment: 2- Apply yellow socks and bracelet for high fall risk patients- Consider moving patient to room near nurses station  Outcome: Progressing  Goal: Maintain or return to baseline ADL function  Description: INTERVENTIONS:-  Assess patient's ability to carry out ADLs; assess patient's baseline for ADL function and identify physical deficits which impact ability to perform ADLs (bathing, care of mouth/teeth, toileting, grooming, dressing, etc.)- Assess/evaluate cause of self-care deficits - Assess range of motion- Assess patient's mobility; develop plan if impaired- Assess patient's need for assistive devices and provide as appropriate- Encourage maximum independence but intervene and supervise when necessary- Involve family in performance of ADLs- Assess for home care needs following discharge - Consider OT consult to assist with ADL evaluation and planning for discharge- Provide patient education as appropriate  Outcome: Progressing  Goal: Maintains/Returns to pre admission functional level  Description: INTERVENTIONS:- Perform AM-PAC 6 Click Basic Mobility/ Daily Activity assessment daily.- Set and communicate daily mobility goal to care team and patient/family/caregiver. - Collaborate with rehabilitation services on mobility goals if consulted- Perform Range of Motion 2 times a day.- Reposition patient every 2 hours.- Dangle patient 2 times a day- Stand patient 2 times a day- Ambulate patient 2 times a day- Out of bed to chair 2 times a day - Out of bed for meals 2 times a day- Out of bed for toileting- Record patient progress and toleration of activity level   Outcome: Progressing     Problem: DISCHARGE PLANNING  Goal: Discharge to home or other facility with appropriate resources  Description: INTERVENTIONS:- Identify barriers to discharge w/patient and caregiver- Arrange for needed discharge resources and transportation as appropriate- Identify discharge learning  needs (meds, wound care, etc.)- Arrange for interpretive services to assist at discharge as needed- Refer to Case Management Department for coordinating discharge planning if the patient needs post-hospital services based on physician/advanced practitioner order or complex needs related to functional status, cognitive ability, or social support system  Outcome: Progressing     Problem: Knowledge Deficit  Goal: Patient/family/caregiver demonstrates understanding of disease process, treatment plan, medications, and discharge instructions  Description: Complete learning assessment and assess knowledge base.Interventions:- Provide teaching at level of understanding- Provide teaching via preferred learning methods  Outcome: Progressing     Problem: GASTROINTESTINAL - ADULT  Goal: Minimal or absence of nausea and/or vomiting  Description: INTERVENTIONS:- Administer IV fluids if ordered to ensure adequate hydration- Maintain NPO status until nausea and vomiting are resolved- Nasogastric tube if ordered- Administer ordered antiemetic medications as needed- Provide nonpharmacologic comfort measures as appropriate- Advance diet as tolerated, if ordered- Consider nutrition services referral to assist patient with adequate nutrition and appropriate food choices  Outcome: Progressing  Goal: Maintains or returns to baseline bowel function  Description: INTERVENTIONS:- Assess bowel function- Encourage oral fluids to ensure adequate hydration- Administer IV fluids if ordered to ensure adequate hydration- Administer ordered medications as needed- Encourage mobilization and activity- Consider nutritional services referral to assist patient with adequate nutrition and appropriate food choices  Outcome: Progressing  Goal: Maintains adequate nutritional intake  Description: INTERVENTIONS:- Monitor percentage of each meal consumed- Identify factors contributing to decreased intake, treat as appropriate- Assist with meals as needed-  Monitor I&O, weight, and lab values if indicated- Obtain nutrition services referral as needed  Outcome: Progressing     Problem: GENITOURINARY - ADULT  Goal: Absence of urinary retention  Description: INTERVENTIONS:- Assess patient’s ability to void and empty bladder- Monitor I/O- Bladder scan as needed- Discuss with physician/AP medications to alleviate retention as needed- Discuss catheterization for long term situations as appropriate  Outcome: Progressing

## 2025-04-10 NOTE — PROGRESS NOTES
"   04/10/25 0120   Gastrointestinal   Abdomen Inspection Soft;Rounded   Bowel Sounds (All Quadrants) Normoactive;Present;Audible   Tenderness Soft;Guarding;Tenderness  (pt c/o lower abdominal discomfort)   Quadrants Tenderness Upper   Last BM Date 04/09/25   Passing Flatus Yes   GI Symptoms Cramping;Diarrhea;Gas;Loss of appetite;Nausea   Nausea Precipitating Factors Movement   Relieved by Antiemetic   Bowel Sounds   RUQ Bowel Sounds Normoactive   LUQ Bowel Sounds Normoactive   RLQ Bowel Sounds Normoactive   LLQ Bowel Sounds Normoactive     Reports \"feeling better\", h/e abdominal pain still present. Continuing to pass loose stool.   "

## 2025-04-10 NOTE — ASSESSMENT & PLAN NOTE
Patient with diarrhea for over 2 months with enteritis on admission, 2 months previous to this had mesenteric lymphadenopathy seen in 2023.  Patient already has history of autoimmune disease from rheumatoid arthritis and psoriatic arthritis.  States that he has failed several Biologics, and is currently on Remicade.  Is due for a dose 2 days after Easter.  Typically Crohn's disease would not have a diffuse appearance.  Recent colonoscopy did not reveal changes suggesting IBD.  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

## 2025-04-10 NOTE — PLAN OF CARE
Problem: PAIN - ADULT  Goal: Verbalizes/displays adequate comfort level or baseline comfort level  Description: Interventions:- Encourage patient to monitor pain and request assistance- Assess pain using appropriate pain scale- Administer analgesics based on type and severity of pain and evaluate response- Implement non-pharmacological measures as appropriate and evaluate response- Consider cultural and social influences on pain and pain management- Notify physician/advanced practitioner if interventions unsuccessful or patient reports new pain  Outcome: Progressing     Problem: INFECTION - ADULT  Goal: Absence or prevention of progression during hospitalization  Description: INTERVENTIONS:- Assess and monitor for signs and symptoms of infection- Monitor lab/diagnostic results- Monitor all insertion sites, i.e. indwelling lines, tubes, and drains- Monitor endotracheal if appropriate and nasal secretions for changes in amount and color- Mobile appropriate cooling/warming therapies per order- Administer medications as ordered- Instruct and encourage patient and family to use good hand hygiene technique- Identify and instruct in appropriate isolation precautions for identified infection/condition  Outcome: Progressing  Goal: Absence of fever/infection during neutropenic period  Description: INTERVENTIONS:- Monitor WBC  Outcome: Progressing     Problem: SAFETY ADULT  Goal: Patient will remain free of falls  Description: INTERVENTIONS:- Educate patient/family on patient safety including physical limitations- Instruct patient to call for assistance with activity - Consult OT/PT to assist with strengthening/mobility - Keep Call bell within reach- Keep bed low and locked with side rails adjusted as appropriate- Keep care items and personal belongings within reach- Initiate and maintain comfort rounds- Make Fall Risk Sign visible to staff- Offer Toileting every   Goal: Maintain or return to baseline ADL  function  Description: INTERVENTIONS:-  Assess patient's ability to carry out ADLs; assess patient's baseline for ADL function and identify physical deficits which impact ability to perform ADLs (bathing, care of mouth/teeth, toileting, grooming, dressing, etc.)- Assess/evaluate cause of self-care deficits - Assess range of motion- Assess patient's mobility; develop plan if impaired- Assess patient's need for assistive devices and provide as appropriate- Encourage maximum independence but intervene and supervise when necessary- Involve family in performance of ADLs- Assess for home care needs following discharge - Consider OT consult to assist with ADL evaluation and planning for discharge- Provide patient education as appropriate  Outcome: Progressing  Goal: Maintains/Returns to pre admission functional level  Description: INTERVENTIONS:- Perform AM-PAC 6 Click Basic Mobility/ Daily Activity assessment daily.- Set and communicate daily mobility goal to care team and patient/family/caregiver. - Collaborate with rehabilitation services on mobility goals if consulted- Perform Range of Motion      Problem: DISCHARGE PLANNING  Goal: Discharge to home or other facility with appropriate resources  Description: INTERVENTIONS:- Identify barriers to discharge w/patient and caregiver- Arrange for needed discharge resources and transportation as appropriate- Identify discharge learning needs (meds, wound care, etc.)- Arrange for interpretive services to assist at discharge as needed- Refer to Case Management Department for coordinating discharge planning if the patient needs post-hospital services based on physician/advanced practitioner order or complex needs related to functional status, cognitive ability, or social support system  Outcome: Progressing     Problem: Knowledge Deficit  Goal: Patient/family/caregiver demonstrates understanding of disease process, treatment plan, medications, and discharge  instructions  Description: Complete learning assessment and assess knowledge base.Interventions:- Provide teaching at level of understanding- Provide teaching via preferred learning methods  Outcome: Progressing     Problem: GASTROINTESTINAL - ADULT  Goal: Minimal or absence of nausea and/or vomiting  Description: INTERVENTIONS:- Administer IV fluids if ordered to ensure adequate hydration- Maintain NPO status until nausea and vomiting are resolved- Nasogastric tube if ordered- Administer ordered antiemetic medications as needed- Provide nonpharmacologic comfort measures as appropriate- Advance diet as tolerated, if ordered- Consider nutrition services referral to assist patient with adequate nutrition and appropriate food choices  Outcome: Progressing  Goal: Maintains or returns to baseline bowel function  Description: INTERVENTIONS:- Assess bowel function- Encourage oral fluids to ensure adequate hydration- Administer IV fluids if ordered to ensure adequate hydration- Administer ordered medications as needed- Encourage mobilization and activity- Consider nutritional services referral to assist patient with adequate nutrition and appropriate food choices  Outcome: Progressing  Goal: Maintains adequate nutritional intake  Description: INTERVENTIONS:- Monitor percentage of each meal consumed- Identify factors contributing to decreased intake, treat as appropriate- Assist with meals as needed- Monitor I&O, weight, and lab values if indicated- Obtain nutrition services referral as needed  Outcome: Progressing

## 2025-04-11 ENCOUNTER — RESULTS FOLLOW-UP (OUTPATIENT)
Dept: OTHER | Facility: HOSPITAL | Age: 50
End: 2025-04-11

## 2025-04-11 VITALS
DIASTOLIC BLOOD PRESSURE: 73 MMHG | HEIGHT: 73 IN | HEART RATE: 61 BPM | TEMPERATURE: 97.5 F | BODY MASS INDEX: 30.75 KG/M2 | RESPIRATION RATE: 19 BRPM | SYSTOLIC BLOOD PRESSURE: 125 MMHG | OXYGEN SATURATION: 97 % | WEIGHT: 232 LBS

## 2025-04-11 DIAGNOSIS — A04.72 C. DIFFICILE DIARRHEA: Primary | ICD-10-CM

## 2025-04-11 PROBLEM — R17 ELEVATED BILIRUBIN: Status: RESOLVED | Noted: 2025-04-08 | Resolved: 2025-04-11

## 2025-04-11 PROBLEM — A41.9 SEPSIS (HCC): Status: RESOLVED | Noted: 2025-04-08 | Resolved: 2025-04-11

## 2025-04-11 LAB
ANION GAP SERPL CALCULATED.3IONS-SCNC: 8 MMOL/L (ref 4–13)
BUN SERPL-MCNC: 7 MG/DL (ref 5–25)
C DIFF TOX A+B STL QL IA: NEGATIVE
C DIFF TOX GENS STL QL NAA+PROBE: POSITIVE
CALCIUM SERPL-MCNC: 9.2 MG/DL (ref 8.4–10.2)
CALPROTECTIN STL-MCNC: 7.73 ÂΜG/G
CHLORIDE SERPL-SCNC: 106 MMOL/L (ref 96–108)
CO2 SERPL-SCNC: 25 MMOL/L (ref 21–32)
CREAT SERPL-MCNC: 0.91 MG/DL (ref 0.6–1.3)
CRP SERPL QL: 7.6 MG/L
ERYTHROCYTE [DISTWIDTH] IN BLOOD BY AUTOMATED COUNT: 13.2 % (ref 11.6–15.1)
GFR SERPL CREATININE-BSD FRML MDRD: 97 ML/MIN/1.73SQ M
GLUCOSE SERPL-MCNC: 103 MG/DL (ref 65–140)
HCT VFR BLD AUTO: 48.2 % (ref 36.5–49.3)
HGB BLD-MCNC: 16.3 G/DL (ref 12–17)
IGA SERPL-MCNC: 266 MG/DL (ref 66–433)
MCH RBC QN AUTO: 29.9 PG (ref 26.8–34.3)
MCHC RBC AUTO-ENTMCNC: 33.8 G/DL (ref 31.4–37.4)
MCV RBC AUTO: 88 FL (ref 82–98)
PLATELET # BLD AUTO: 263 THOUSANDS/UL (ref 149–390)
PMV BLD AUTO: 11 FL (ref 8.9–12.7)
POTASSIUM SERPL-SCNC: 3.6 MMOL/L (ref 3.5–5.3)
RBC # BLD AUTO: 5.45 MILLION/UL (ref 3.88–5.62)
SODIUM SERPL-SCNC: 139 MMOL/L (ref 135–147)
WBC # BLD AUTO: 3.97 THOUSAND/UL (ref 4.31–10.16)

## 2025-04-11 PROCEDURE — 80048 BASIC METABOLIC PNL TOTAL CA: CPT | Performed by: NURSE PRACTITIONER

## 2025-04-11 PROCEDURE — 99232 SBSQ HOSP IP/OBS MODERATE 35: CPT | Performed by: INTERNAL MEDICINE

## 2025-04-11 PROCEDURE — 86364 TISS TRNSGLTMNASE EA IG CLAS: CPT | Performed by: PHYSICIAN ASSISTANT

## 2025-04-11 PROCEDURE — 86140 C-REACTIVE PROTEIN: CPT | Performed by: PHYSICIAN ASSISTANT

## 2025-04-11 PROCEDURE — 82397 CHEMILUMINESCENT ASSAY: CPT | Performed by: PHYSICIAN ASSISTANT

## 2025-04-11 PROCEDURE — 85027 COMPLETE CBC AUTOMATED: CPT | Performed by: NURSE PRACTITIONER

## 2025-04-11 PROCEDURE — 99239 HOSP IP/OBS DSCHRG MGMT >30: CPT | Performed by: NURSE PRACTITIONER

## 2025-04-11 PROCEDURE — 80230 DRUG ASSAY INFLIXIMAB: CPT | Performed by: PHYSICIAN ASSISTANT

## 2025-04-11 PROCEDURE — 82784 ASSAY IGA/IGD/IGG/IGM EACH: CPT | Performed by: PHYSICIAN ASSISTANT

## 2025-04-11 RX ORDER — PANTOPRAZOLE SODIUM 40 MG/1
40 TABLET, DELAYED RELEASE ORAL 2 TIMES DAILY
Qty: 60 TABLET | Refills: 0 | Status: SHIPPED | OUTPATIENT
Start: 2025-04-11 | End: 2025-05-11

## 2025-04-11 RX ORDER — DICYCLOMINE HYDROCHLORIDE 10 MG/1
10 CAPSULE ORAL
Qty: 120 CAPSULE | Refills: 0 | Status: SHIPPED | OUTPATIENT
Start: 2025-04-11 | End: 2025-04-11

## 2025-04-11 RX ORDER — DICYCLOMINE HYDROCHLORIDE 10 MG/1
20 CAPSULE ORAL
Qty: 240 CAPSULE | Refills: 0 | Status: SHIPPED | OUTPATIENT
Start: 2025-04-11 | End: 2025-05-11

## 2025-04-11 RX ORDER — VANCOMYCIN HYDROCHLORIDE 125 MG/1
125 CAPSULE ORAL 4 TIMES DAILY
Qty: 40 CAPSULE | Refills: 0 | Status: SHIPPED | OUTPATIENT
Start: 2025-04-11 | End: 2025-04-21

## 2025-04-11 RX ORDER — DICYCLOMINE HYDROCHLORIDE 10 MG/1
20 CAPSULE ORAL
Status: DISCONTINUED | OUTPATIENT
Start: 2025-04-11 | End: 2025-04-11 | Stop reason: HOSPADM

## 2025-04-11 RX ADMIN — FAMOTIDINE 10 MG: 20 TABLET, FILM COATED ORAL at 10:08

## 2025-04-11 RX ADMIN — DICYCLOMINE HYDROCHLORIDE 10 MG: 10 CAPSULE ORAL at 10:08

## 2025-04-11 RX ADMIN — ENOXAPARIN SODIUM 40 MG: 40 INJECTION SUBCUTANEOUS at 10:07

## 2025-04-11 RX ADMIN — ESCITALOPRAM OXALATE 10 MG: 10 TABLET ORAL at 10:08

## 2025-04-11 RX ADMIN — GABAPENTIN 400 MG: 400 CAPSULE ORAL at 10:08

## 2025-04-11 RX ADMIN — PANTOPRAZOLE SODIUM 40 MG: 40 INJECTION, POWDER, FOR SOLUTION INTRAVENOUS at 10:08

## 2025-04-11 NOTE — ASSESSMENT & PLAN NOTE
Evident by leukocytosis 17 and heart rate 99 upon presentation to the ED in setting of suspected enteritis, dehydration  Blood cultures negative x 72 hours  Was maintained on IV Zosyn, will discontinue and monitor  Stool for bacteria was negative  Resolved

## 2025-04-11 NOTE — DISCHARGE SUMMARY
Discharge Summary - Hospitalist   Name: Cory Sheppard 49 y.o. male I MRN: [id number]  Unit/Bed#: MS Hdz I Date of Admission: 4/7/2025   Date of Service: 4/11/2025 I Hospital Day: 1     Assessment & Plan  Enteritis  Patient presented to the ED with complaints of nausea/vomiting, diarrhea with diffuse generalized abdominal pain x 1-2 days  CT Abd/Pelvis (4/7/25): Findings above which may reflect enteritis in the appropriate clinical setting. Colonic diverticulosis without evidence of acute diverticulitis.  Bacterial stool panel negative  Continue Bentyl QID  GI Consult completed 4/10  CT Enterography completed, findings were normal.  Follow-up with fecal calprotectin, c-diff studies  Will trial Xifaxan OP  Sepsis (HCC) (Resolved: 4/11/2025)  Evident by leukocytosis 17 and heart rate 99 upon presentation to the ED in setting of suspected enteritis, dehydration  Blood cultures negative x 72 hours  Was maintained on IV Zosyn, will discontinue and monitor  Stool for bacteria was negative  Resolved  Elevated bilirubin (Resolved: 4/11/2025)  Likely reactive  Resolved  Irritable bowel syndrome with diarrhea  Follow-up with GI OP     Medical Problems       Resolved Problems  Date Reviewed: 2/28/2025          Resolved    Nausea and vomiting 4/10/2025     Resolved by  NIALL Alvarez    Dehydration 4/10/2025     Resolved by  NIALL Alvarez    Leukocytosis 4/9/2025     Resolved by  NIALL Alvarez    Elevated bilirubin 4/11/2025     Resolved by  NIALL Alvarez    Sepsis (HCC) 4/11/2025     Resolved by  NIALL Alvarez        Discharging Physician / Practitioner: NIALL Alvarez  PCP: Esdras Ya DO  Admission Date:   Admission Orders (From admission, onward)       Ordered        04/10/25 1207  INPATIENT ADMISSION  Once            04/08/25 0112  Place in Observation  Once                          Discharge Date: 04/11/25    Consultations During  Hospital Stay:  IP CONSULT TO GASTROENTEROLOGY    Procedures Performed:   None    Significant Findings / Test Results:   CT small bowel enterography   Final Result by Ricco Daniels MD (04/10 0803)      - Inflammation: No imaging signs of active inflammation.   - Stricture: None.   - Penetrating complication: None.   - Perianal disease: None.   - Other complications: None.      Sigmoid diverticulosis.      Fatty liver.            Workstation performed: [id number]         CT abdomen pelvis with contrast   Final Result by Sumeet Mckeon MD (04/08 6224)      Findings above which may reflect enteritis in the appropriate clinical setting.      Colonic diverticulosis without evidence of acute diverticulitis.         Workstation performed: [id number]             Incidental Findings:   None     Test Results Pending at Discharge (will require follow up):   None     Outpatient Tests Requested:  Follow-up with PCP within 1 wk  Follow-up with GI    Complications:  None    Reason for Admission: Enteritis    Hospital Course:   Cory Sheppard is a 49 y.o. male patient with past medical history of GERD, RA, Psoriasis, Migraines who originally presented to the hospital on 4/7/2025 due to complaints of diarrhea, abdominal pains which had been ongoing x 2 months and had been seen OP by Dr. Boo and had a colonoscopy completed which was unrevealing.  However, symptoms persisted and prompted patient to come to the ER.    CT imaging noted above, was found to have non-specific findings of fluid distended loops of small bowel.  Attempted conservative treatment with IVF, Pain management, as well as PPI.  However, discomfort persisted.     GI consulted on 4/10 for additional recommendations.  CT Enterography was ordered and completed without acute findings.  Initiated on Xifaxan, Bentyl.    Symptoms stabilized, tolerating liquid/soft diet - encouraged patient to follow soft bland diet and advance as tolerated.  Will follow-up with  "GI OP.    Please see above list of diagnoses and related plan for additional information.     Condition at Discharge: stable    Discharge Day Visit / Exam:   Subjective:  Patient resting in bed, denies complaints of chest pain, shortness of breath, fever or chills.  No nausea/vomiting.  Some diarrhea still but better.    Vitals: Blood Pressure: 125/73 (04/11/25 0753)  Pulse: 61 (04/11/25 0753)  Temperature: 97.5 °F (36.4 °C) (04/11/25 0753)  Temp Source: Oral (04/11/25 0753)  Respirations: 19 (04/11/25 0753)  Height: 6' 1\" (185.4 cm) (04/08/25 1500)  Weight - Scale: 105 kg (232 lb) (04/08/25 1500)  SpO2: 97 % (04/11/25 1016)    Physical Exam  Vitals and nursing note reviewed.   Constitutional:       General: He is not in acute distress.  Cardiovascular:      Rate and Rhythm: Normal rate.      Pulses: Normal pulses.   Pulmonary:      Effort: Pulmonary effort is normal.   Abdominal:      General: Bowel sounds are normal. There is no distension.      Palpations: Abdomen is soft.      Tenderness: There is no abdominal tenderness.   Musculoskeletal:         General: Normal range of motion.   Skin:     General: Skin is warm and dry.      Capillary Refill: Capillary refill takes less than 2 seconds.   Neurological:      Mental Status: He is alert and oriented to person, place, and time.   Psychiatric:         Mood and Affect: Mood normal.          Discussion with Family: Patient declined call to .     Discharge instructions/Information to patient and family:   See after visit summary for information provided to patient and family.      Provisions for Follow-Up Care:  See after visit summary for information related to follow-up care and any pertinent home health orders.      Mobility at time of Discharge:   Basic Mobility Inpatient Raw Score: 24  JH-HLM Goal: 8: Walk 250 feet or more  JH-HLM Achieved: 8: Walk 250 feet ot more  HLM Goal achieved. Continue to encourage appropriate mobility.     Disposition: "   Home    Planned Readmission: None    Discharge Medications:  See after visit summary for reconciled discharge medications provided to patient and/or family.      Administrative Statements   Discharge Statement:  I have spent a total time of 38 minutes in caring for this patient on the day of the visit/encounter. >30 minutes of time was spent on: Diagnostic results, Risks and benefits of tx options, Instructions for management, Patient and family education, Importance of tx compliance, Risk factor reductions, Impressions, Counseling / Coordination of care, Documenting in the medical record, Reviewing / ordering tests, medicine, procedures  , and Communicating with other healthcare professionals .    **Please Note: This note may have been constructed using a voice recognition system**

## 2025-04-11 NOTE — UTILIZATION REVIEW
Continued Stay Review    Date: 04/11 Day 2 IP                          Current Patient Class: Inpatient  Current Level of Care: MS    HPI:49 y.o. male initially admitted on 04/10   Current Diagnosis: Enteritis, sepsis    Assessment/Plan:   04/10 GI Consult: Enteritis, diarrhea:   CAT scan of abdomen shows mild fluid distended loops of small bowel with long segment wall thickening of the sigmoid colon likely on the basis of diverticular disease. Hemoglobin 15.7. Blood cultures negative.   Plan: Check CT enterography. Check CRP fecal calprotectin. Check C. difficile. consider checking celiac panel, Giardia, ova and parasites, fecal elastase as well. Continue supportive care. If CT enterography is negative, would recommend treatment outpatient with Xifaxan for postinfectious irritable bowel syndrome    04/11 Pt reports that his diarrhea is starting to resolve, and had a more small solid bowel movement this morning. Reports that he is still having abdominal cramping.  Has not much solid food yet. CT enterography did not reveal any imaging signs of active inflammation. 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   F/u fecal calprotectin and C. Difficile. Advance to low residue diet. Continue supportive care. Bentyl 20 mg every 8 hours as needed. -Low residue diet, avoid dairy. OP Xifaxan tx .    Medications:   Scheduled Medications:  dicyclomine, 10 mg, Oral, 4x Daily (AC & HS)  enoxaparin, 40 mg, Subcutaneous, Daily  escitalopram, 10 mg, Oral, Daily  famotidine, 10 mg, Oral, BID  gabapentin, 400 mg, Oral, TID  pantoprazole, 40 mg, Intravenous, Q24H EDMOND      Continuous IV Infusions:     PRN Meds:  HYDROmorphone, 1 mg, Intravenous, Q4H PRN  loperamide, 2 mg, Oral, TID PRN   promethazine, 25 mg, Intramuscular, Q6H PRN      Discharge Plan: TBD    Vital Signs (last 3 days)       Date/Time Temp Pulse Resp BP MAP (mmHg) SpO2 O2 Device Patient Position - Orthostatic VS Abbey Coma Scale Score Pain    04/11/25 07:53:10  97.5 °F (36.4 °C) 61 19 125/73 90 98 % None (Room air) Lying -- --    04/11/25 0254 -- -- -- -- -- 95 % None (Room air) -- 15 4    04/10/25 22:06:40 97.6 °F (36.4 °C) 70 -- 140/93 109 96 % -- -- -- --    04/10/25 15:32:55 98 °F (36.7 °C) 66 -- 146/81 103 98 % -- -- -- --    04/10/25 0908 -- 67 18 -- -- 99 % None (Room air) -- -- 7    04/10/25 0900 -- -- -- -- -- 97 % None (Room air) -- 15 --    04/10/25 08:06:44 98 °F (36.7 °C) 71 -- 154/85 108 98 % -- -- -- --    04/10/25 0120 -- -- -- -- -- 93 % None (Room air) -- 15 7    04/09/25 22:33:14 98.9 °F (37.2 °C) 69 18 133/83 100 97 % -- -- -- --    04/09/25 18:58:40 -- 75 18 118/86 97 96 % None (Room air) Sitting -- --    04/09/25 1856 -- 73 -- -- -- -- -- -- -- --    04/09/25 1851 -- 75 -- -- -- 96 % None (Room air) -- -- 7 04/09/25 16:52:41 98.4 °F (36.9 °C) 66 -- -- -- 96 % None (Room air) -- -- --    04/09/25 15:02:08 97.5 °F (36.4 °C) 70 18 134/80 98 96 % None (Room air) Sitting -- --    04/09/25 12:33:49 -- 77 16 137/91 106 96 % -- Sitting -- --    04/09/25 1231 -- 88 -- -- -- 98 % None (Room air) -- -- 7 04/09/25 08:29:34 98.3 °F (36.8 °C) 87 -- 131/81 98 94 % -- -- -- --    04/09/25 0258 -- -- -- -- -- 90 % None (Room air) -- 15 10 - Worst Possible Pain    04/08/25 22:28:16 100.1 °F (37.8 °C) 90 -- 131/77 95 95 % -- -- -- --    04/08/25 2028 -- -- -- -- -- -- -- -- -- 10 - Worst Possible Pain    04/08/25 1700 -- -- -- -- -- 94 % -- -- -- --    04/08/25 1554 -- 83 -- -- -- 95 % None (Room air) -- -- 8    04/08/25 1500 -- -- -- -- -- 93 % -- -- -- --    04/08/25 1428 -- -- -- -- -- 96 % None (Room air) -- -- --    04/08/25 13:18:04 97.5 °F (36.4 °C) 72 20 139/82 101 98 % None (Room air) Lying -- 8    04/08/25 1318 -- -- -- -- -- -- None (Room air) Lying -- --    04/08/25 1300 -- -- -- -- -- 96 % -- -- -- --    04/08/25 1200 -- 69 16 104/60 76 95 % -- -- -- --    04/08/25 1130 -- 72 16 105/59 75 94 % -- -- -- --    04/08/25 1030 -- 76 16 116/64 83 91 % -- --  -- --    04/08/25 1020 -- -- -- -- -- -- -- -- -- 8 04/08/25 1000 -- 81 17 113/66 83 93 % -- -- -- --    04/08/25 0930 -- 77 17 114/69 87 94 % -- -- -- --    04/08/25 0900 -- 89 17 123/74 94 96 % -- -- -- --    04/08/25 0830 -- 82 17 112/69 86 96 % -- -- -- --    04/08/25 0800 -- 73 17 105/67 81 95 % -- -- -- --    04/08/25 0730 -- 73 17 111/65 81 94 % -- -- -- --    04/08/25 0700 -- 77 17 112/71 86 94 % -- -- -- --    04/08/25 0630 -- 76 17 126/62 88 94 % -- -- -- --    04/08/25 0530 -- 85 17 108/65 80 92 % -- -- -- --    04/08/25 0500 -- 85 17 100/63 76 93 % -- -- -- --    04/08/25 0430 -- 85 17 101/69 78 94 % -- -- -- --    04/08/25 0400 -- 82 17 108/71 85 93 % -- -- -- --    04/08/25 0337 -- -- -- -- -- -- -- -- -- 8 04/08/25 0330 -- 88 17 128/71 86 95 % -- -- -- --    04/08/25 0300 -- 92 18 128/76 97 95 % -- -- -- --    04/08/25 0230 -- 92 18 128/78 98 94 % -- -- -- --    04/08/25 0200 -- 93 18 130/98 109 95 % -- -- -- --    04/08/25 0130 -- 91 -- 130/84 102 95 % None (Room air) -- -- --    04/08/25 0100 -- 95 -- 141/86 108 93 % None (Room air) Sitting -- --    04/08/25 0030 -- 95 18 132/84 103 96 % None (Room air) Sitting -- --    04/08/25 0024 -- -- -- -- -- -- -- -- -- 8    04/08/25 0000 -- 94 18 132/82 99 96 % None (Room air) Sitting -- --          Weight (last 2 days)       None            Pertinent Labs/Diagnostic Results:   Radiology:  CT small bowel enterography   Final Interpretation by Ricco Daniels MD (04/10 2967)      - Inflammation: No imaging signs of active inflammation.   - Stricture: None.   - Penetrating complication: None.   - Perianal disease: None.   - Other complications: None.      Sigmoid diverticulosis.      Fatty liver.            Workstation performed: [id number]         CT abdomen pelvis with contrast   Final Interpretation by Sumeet Mckeon MD (04/08 6984)      Findings above which may reflect enteritis in the appropriate clinical setting.      Colonic diverticulosis without  "evidence of acute diverticulitis.         Workstation performed: [id number]           Cardiology:  No orders to display     GI:  No orders to display           Results from last 7 days   Lab Units 04/11/25  0635 04/10/25  0458 04/09/25  0520 04/08/25  0451 04/07/25  2207   WBC Thousand/uL 3.97* 3.11* 7.61 10.89* 17.66*   HEMOGLOBIN g/dL 16.3 15.7 15.6 15.5 17.4*   HEMATOCRIT % 48.2 47.7 46.7 45.9 50.9*   PLATELETS Thousands/uL 263 247 235 270 296   TOTAL NEUT ABS Thousands/µL  --   --   --  9.80* 16.15*         Results from last 7 days   Lab Units 04/11/25  0635 04/10/25  0458 04/09/25  0520 04/08/25  0451 04/07/25  2207   SODIUM mmol/L 139 141 137 138 137   POTASSIUM mmol/L 3.6 3.5 3.3* 3.7 3.7   CHLORIDE mmol/L 106 109* 106 108 105   CO2 mmol/L 25 25 23 23 21   ANION GAP mmol/L 8 7 8 7 11   BUN mg/dL 7 6 10 18 20   CREATININE mg/dL 0.91 0.82 0.95 0.85 0.84   EGFR ml/min/1.73sq m 97 103 92 101 102   CALCIUM mg/dL 9.2 8.7 7.6* 7.8* 8.9   MAGNESIUM mg/dL  --  2.2  --   --   --      Results from last 7 days   Lab Units 04/10/25  0458 04/08/25  0451 04/07/25  2207   AST U/L 58* 29 33   ALT U/L 95* 39 46   ALK PHOS U/L 61 50 64   TOTAL PROTEIN g/dL 6.4 6.1* 7.6   ALBUMIN g/dL 4.1 3.9 4.8   TOTAL BILIRUBIN mg/dL 0.97 1.89* 1.85*   BILIRUBIN DIRECT mg/dL 0.19  --   --          Results from last 7 days   Lab Units 04/11/25  0635 04/10/25  0458 04/09/25  0520 04/08/25  0451 04/07/25  2207   GLUCOSE RANDOM mg/dL 103 90 113 116 139             No results found for: \"BETA-HYDROXYBUTYRATE\"                                       Results from last 7 days   Lab Units 04/07/25  2207   LACTIC ACID mmol/L 2.0                                 Results from last 7 days   Lab Units 04/07/25  2207   LIPASE u/L 51     Results from last 7 days   Lab Units 04/11/25  0635 04/10/25  0458   CRP mg/L 7.6* 18.2*             Results from last 7 days   Lab Units 04/07/25  2354   CLARITY UA  Clear   COLOR UA  Light Yellow   SPEC GRAV UA  >=1.050*   PH " UA  6.5   GLUCOSE UA mg/dl Negative   KETONES UA mg/dl 20 (1+)*   BLOOD UA  Negative   PROTEIN UA mg/dl Negative   NITRITE UA  Negative   BILIRUBIN UA  Negative   UROBILINOGEN UA (BE) mg/dl <2.0   LEUKOCYTES UA  Negative                         Results from last 7 days   Lab Units 04/08/25  1529   SALMONELLA SP PCR  Negative   SHIGELLA SP/ENTEROINVASIVE E. COLI (EIEC)  Negative   CAMPYLOBACTER SP (JEJUNI AND COLI)  Negative   SHIGA TOXIN 1/SHIGA TOXIN 2  Negative         Results from last 7 days   Lab Units 04/08/25  0216   BLOOD CULTURE  No Growth at 72 hrs.  No Growth at 72 hrs.                   Network Utilization Review Department  ATTENTION: Please call with any questions or concerns to [phone] and carefully listen to the prompts so that you are directed to the right person. All voicemails are confidential.   For Discharge needs, contact Care Management DC Support Team at [phone] opt. 2  Send all requests for admission clinical reviews, approved or denied determinations and any other requests to dedicated fax number below belonging to the [place] where the patient is receiving treatment. List of dedicated fax numbers for the Facilities:  FACILITY NAME UR FAX NUMBER   ADMISSION DENIALS (Administrative/Medical Necessity) [phone]   DISCHARGE SUPPORT TEAM (NETWORK) [phone]   PARENT CHILD HEALTH (Maternity/NICU/Pediatrics) [phone]   [facility] [phone]   [facility] [phone]   [facility] [phone]   [facility] [phone]   [facility] [phone]   [facility] [phone]   [facility] [phone]   [facility] [phone]   [facility] [phone]   [facility] [phone]   [facility]  [facility] [phone]   [facility] [place] [phone]

## 2025-04-11 NOTE — PLAN OF CARE
Problem: PAIN - ADULT  Goal: Verbalizes/displays adequate comfort level or baseline comfort level  Description: Interventions:- Encourage patient to monitor pain and request assistance- Assess pain using appropriate pain scale- Administer analgesics based on type and severity of pain and evaluate response- Implement non-pharmacological measures as appropriate and evaluate response- Consider cultural and social influences on pain and pain management- Notify physician/advanced practitioner if interventions unsuccessful or patient reports new pain  Outcome: Progressing     Problem: INFECTION - ADULT  Goal: Absence or prevention of progression during hospitalization  Description: INTERVENTIONS:- Assess and monitor for signs and symptoms of infection- Monitor lab/diagnostic results- Monitor all insertion sites, i.e. indwelling lines, tubes, and drains- Monitor endotracheal if appropriate and nasal secretions for changes in amount and color- [place] appropriate cooling/warming therapies per order- Administer medications as ordered- Instruct and encourage patient and family to use good hand hygiene technique- Identify and instruct in appropriate isolation precautions for identified infection/condition  Outcome: Progressing  Goal: Absence of fever/infection during neutropenic period  Description: INTERVENTIONS:- Monitor WBC  Outcome: Progressing     Problem: SAFETY ADULT  Goal: Patient will remain free of falls  Description: INTERVENTIONS:- Educate patient/family on patient safety including physical limitations- Instruct patient to call for assistance with activity - Consult OT/PT to assist with strengthening/mobility - Keep Call bell within reach- Keep bed low and locked with side rails adjusted as appropriate- Keep care items and personal belongings within reach- Initiate and maintain comfort rounds- Make Fall Risk Sign visible to staff-   Outcome: Progressing  Goal: Maintain or return to baseline ADL  function  Description: INTERVENTIONS:-  Assess patient's ability to carry out ADLs; assess patient's baseline for ADL function and identify physical deficits which impact ability to perform ADLs (bathing, care of mouth/teeth, toileting, grooming, dressing, etc.)- Assess/evaluate cause of self-care deficits - Assess range of motion- Assess patient's mobility; develop plan if impaired- Assess patient's need for assistive devices and provide as appropriate- Encourage maximum independence but intervene and supervise when necessary- Involve family in performance of ADLs- Assess for home care needs following discharge - Consider OT consult to assist with ADL evaluation and planning for discharge- Provide patient education as appropriate  Outcome: Progressing  Goal: Maintains/Returns to pre admission functional level  Description: INTERVENTIONS:- Perform AM-PAC 6 Click Basic Mobility/ Daily Activity assessment daily.- Set and communicate daily mobility goal to care team and patient/family/caregiver. -     Problem: DISCHARGE PLANNING  Goal: Discharge to home or other facility with appropriate resources  Description: INTERVENTIONS:- Identify barriers to discharge w/patient and caregiver- Arrange for needed discharge resources and transportation as appropriate- Identify discharge learning needs (meds, wound care, etc.)- Arrange for interpretive services to assist at discharge as needed- Refer to Case Management Department for coordinating discharge planning if the patient needs post-hospital services based on physician/advanced practitioner order or complex needs related to functional status, cognitive ability, or social support system  Outcome: Progressing     Problem: Knowledge Deficit  Goal: Patient/family/caregiver demonstrates understanding of disease process, treatment plan, medications, and discharge instructions  Description: Complete learning assessment and assess knowledge base.Interventions:- Provide teaching at  level of understanding- Provide teaching via preferred learning methods  Outcome: Progressing     Problem: GASTROINTESTINAL - ADULT  Goal: Minimal or absence of nausea and/or vomiting  Description: INTERVENTIONS:- Administer IV fluids if ordered to ensure adequate hydration- Maintain NPO status until nausea and vomiting are resolved- Nasogastric tube if ordered- Administer ordered antiemetic medications as needed- Provide nonpharmacologic comfort measures as appropriate- Advance diet as tolerated, if ordered- Consider nutrition services referral to assist patient with adequate nutrition and appropriate food choices  Outcome: Progressing  Goal: Maintains or returns to baseline bowel function  Description: INTERVENTIONS:- Assess bowel function- Encourage oral fluids to ensure adequate hydration- Administer IV fluids if ordered to ensure adequate hydration- Administer ordered medications as needed- Encourage mobilization and activity- Consider nutritional services referral to assist patient with adequate nutrition and appropriate food choices  Outcome: Progressing  Goal: Maintains adequate nutritional intake  Description: INTERVENTIONS:- Monitor percentage of each meal consumed- Identify factors contributing to decreased intake, treat as appropriate- Assist with meals as needed- Monitor I&O, weight, and lab values if indicated- Obtain nutrition services referral as needed  Outcome: Progressing

## 2025-04-11 NOTE — ASSESSMENT & PLAN NOTE
Patient presented to the ED with complaints of nausea/vomiting, diarrhea with diffuse generalized abdominal pain x 1-2 days  CT Abd/Pelvis (4/7/25): Findings above which may reflect enteritis in the appropriate clinical setting. Colonic diverticulosis without evidence of acute diverticulitis.  Bacterial stool panel negative  Continue Bentyl QID  GI Consult completed 4/10  CT Enterography completed, findings were normal.  Follow-up with fecal calprotectin, c-diff studies  Will trial Xifaxan OP

## 2025-04-11 NOTE — PROGRESS NOTES
Progress Note - Gastroenterology   Name: Cory Sheppard 49 y.o. male I MRN: [id number]  Unit/Bed#: [facility] I Date of Admission: 4/7/2025   Date of Service: 4/11/2025 I Hospital Day: 1    Assessment & Plan  Enteritis  Patient with diarrhea for over 2 months with enteritis on admission, 2 months previous to this had mesenteric lymphadenopathy seen in 2023.  Patient already has history of autoimmune disease from rheumatoid arthritis and psoriatic arthritis.  States that he has failed several Biologics, and is currently on Remicade.  Is due for a dose 2 days after Easter.  Typically Crohn's disease would not have a diffuse appearance.  Recent colonoscopy did not reveal changes suggesting IBD.  Fortunately his CT enterography did not reveal any imaging signs of active inflammation.  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  -Follow-up fecal calprotectin and C. Difficile  -If fecal calprotectin is elevated, can consider outpatient video capsule endoscopy although enterography is typically rather accurate  -Advance to low residue diet  -Continue supportive care  - I have already sent outpatient treatment of Xifaxan to the pharmacy for postinfectious irritable bowel syndrome  -Bentyl 20 mg every 8 hours as needed  -Low residue diet, avoid dairy  -Will also arrange follow-up with the office  Irritable bowel syndrome with diarrhea      I have discussed the above management plan in detail with the primary service.  Patient will be seen and examined by Dr. Guidry.  GI will sign off.    Subjective   Patient reports that he has not had much for solid food yet.  Reports that his diarrhea is starting to resolve, and had a more small solid bowel movement this morning.  Reports that he is still having abdominal cramping.  We went over CT enterography results.    Objective :  Temp:  [97.5 °F (36.4 °C)-98 °F (36.7 °C)] 97.5 °F (36.4 °C)  HR:  [61-70] 61  BP: (125-146)/(73-93) 125/73  Resp:  [19] 19  SpO2:  [95 %-98  %] 98 %  O2 Device: None (Room air)    Physical Exam  Constitutional:       General: He is not in acute distress.     Appearance: He is well-developed. He is not diaphoretic.   HENT:      Head: Normocephalic and atraumatic.   Eyes:      General: No scleral icterus.     Conjunctiva/sclera: Conjunctivae normal.      Pupils: Pupils are equal, round, and reactive to light.   Neck:      Thyroid: No thyromegaly.      Trachea: No tracheal deviation.   Cardiovascular:      Rate and Rhythm: Normal rate and regular rhythm.   Pulmonary:      Effort: Pulmonary effort is normal.      Breath sounds: Normal breath sounds. No wheezing or rales.   Abdominal:      General: Bowel sounds are normal. There is no distension.      Palpations: Abdomen is soft. Abdomen is not rigid. There is no mass.      Tenderness: There is no abdominal tenderness. There is no guarding or rebound.   Musculoskeletal:      Cervical back: Neck supple.   Lymphadenopathy:      Cervical: No cervical adenopathy.   Skin:     General: Skin is warm and dry.      Findings: No erythema or rash.   Neurological:      Mental Status: He is alert and oriented to person, place, and time.   Psychiatric:         Behavior: Behavior normal.         Lab Results: I have reviewed the following results:

## 2025-04-11 NOTE — ASSESSMENT & PLAN NOTE
Patient with diarrhea for over 2 months with enteritis on admission, 2 months previous to this had mesenteric lymphadenopathy seen in 2023.  Patient already has history of autoimmune disease from rheumatoid arthritis and psoriatic arthritis.  States that he has failed several Biologics, and is currently on Remicade.  Is due for a dose 2 days after Easter.  Typically Crohn's disease would not have a diffuse appearance.  Recent colonoscopy did not reveal changes suggesting IBD.  Fortunately his CT enterography did not reveal any imaging signs of active inflammation.  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  -Follow-up fecal calprotectin and C. Difficile  -If fecal calprotectin is elevated, can consider outpatient video capsule endoscopy although enterography is typically rather accurate  -Advance to low residue diet  -Continue supportive care  - I have already sent outpatient treatment of Xifaxan to the pharmacy for postinfectious irritable bowel syndrome  -Bentyl 20 mg every 8 hours as needed  -Low residue diet, avoid dairy  -Will also arrange follow-up with the office

## 2025-04-11 NOTE — PLAN OF CARE
Problem: PAIN - ADULT  Goal: Verbalizes/displays adequate comfort level or baseline comfort level  Description: Interventions:- Encourage patient to monitor pain and request assistance- Assess pain using appropriate pain scale- Administer analgesics based on type and severity of pain and evaluate response- Implement non-pharmacological measures as appropriate and evaluate response- Consider cultural and social influences on pain and pain management- Notify physician/advanced practitioner if interventions unsuccessful or patient reports new pain  Outcome: Progressing     Problem: INFECTION - ADULT  Goal: Absence or prevention of progression during hospitalization  Description: INTERVENTIONS:- Assess and monitor for signs and symptoms of infection- Monitor lab/diagnostic results- Monitor all insertion sites, i.e. indwelling lines, tubes, and drains- Monitor endotracheal if appropriate and nasal secretions for changes in amount and color- [place] appropriate cooling/warming therapies per order- Administer medications as ordered- Instruct and encourage patient and family to use good hand hygiene technique- Identify and instruct in appropriate isolation precautions for identified infection/condition  Outcome: Progressing  Goal: Absence of fever/infection during neutropenic period  Description: INTERVENTIONS:- Monitor WBC  Outcome: Progressing     Problem: SAFETY ADULT  Goal: Patient will remain free of falls  Description: INTERVENTIONS:- Educate patient/family on patient safety including physical limitations- Instruct patient to call for assistance with activity - Consult OT/PT to assist with strengthening/mobility - Keep Call bell within reach- Keep bed low and locked with side rails adjusted as appropriate- Keep care items and personal belongings within reach- Initiate and maintain comfort rounds- Make Fall Risk Sign visible to staff- Offer Toileting every 2 Hours, in advance of need- Initiate/Maintain 2alarm-  Obtain necessary fall risk management equipment: 2- Apply yellow socks and bracelet for high fall risk patients- Consider moving patient to room near nurses station  Outcome: Progressing  Goal: Maintain or return to baseline ADL function  Description: INTERVENTIONS:-  Assess patient's ability to carry out ADLs; assess patient's baseline for ADL function and identify physical deficits which impact ability to perform ADLs (bathing, care of mouth/teeth, toileting, grooming, dressing, etc.)- Assess/evaluate cause of self-care deficits - Assess range of motion- Assess patient's mobility; develop plan if impaired- Assess patient's need for assistive devices and provide as appropriate- Encourage maximum independence but intervene and supervise when necessary- Involve family in performance of ADLs- Assess for home care needs following discharge - Consider OT consult to assist with ADL evaluation and planning for discharge- Provide patient education as appropriate  Outcome: Progressing  Goal: Maintains/Returns to pre admission functional level  Description: INTERVENTIONS:- Perform AM-PAC 6 Click Basic Mobility/ Daily Activity assessment daily.- Set and communicate daily mobility goal to care team and patient/family/caregiver. - Collaborate with rehabilitation services on mobility goals if consulted- Perform Range of Motion 2 times a day.- Reposition patient every 2 hours.- Dangle patient 2 times a day- Stand patient 2 times a day- Ambulate patient 2 times a day- Out of bed to chair 2 times a day - Out of bed for meals 2 times a day- Out of bed for toileting- Record patient progress and toleration of activity level   Outcome: Progressing     Problem: DISCHARGE PLANNING  Goal: Discharge to home or other facility with appropriate resources  Description: INTERVENTIONS:- Identify barriers to discharge w/patient and caregiver- Arrange for needed discharge resources and transportation as appropriate- Identify discharge learning  needs (meds, wound care, etc.)- Arrange for interpretive services to assist at discharge as needed- Refer to Case Management Department for coordinating discharge planning if the patient needs post-hospital services based on physician/advanced practitioner order or complex needs related to functional status, cognitive ability, or social support system  Outcome: Progressing     Problem: Knowledge Deficit  Goal: Patient/family/caregiver demonstrates understanding of disease process, treatment plan, medications, and discharge instructions  Description: Complete learning assessment and assess knowledge base.Interventions:- Provide teaching at level of understanding- Provide teaching via preferred learning methods  Outcome: Progressing     Problem: GASTROINTESTINAL - ADULT  Goal: Minimal or absence of nausea and/or vomiting  Description: INTERVENTIONS:- Administer IV fluids if ordered to ensure adequate hydration- Maintain NPO status until nausea and vomiting are resolved- Nasogastric tube if ordered- Administer ordered antiemetic medications as needed- Provide nonpharmacologic comfort measures as appropriate- Advance diet as tolerated, if ordered- Consider nutrition services referral to assist patient with adequate nutrition and appropriate food choices  Outcome: Progressing  Goal: Maintains or returns to baseline bowel function  Description: INTERVENTIONS:- Assess bowel function- Encourage oral fluids to ensure adequate hydration- Administer IV fluids if ordered to ensure adequate hydration- Administer ordered medications as needed- Encourage mobilization and activity- Consider nutritional services referral to assist patient with adequate nutrition and appropriate food choices  Outcome: Progressing  Goal: Maintains adequate nutritional intake  Description: INTERVENTIONS:- Monitor percentage of each meal consumed- Identify factors contributing to decreased intake, treat as appropriate- Assist with meals as needed-  Monitor I&O, weight, and lab values if indicated- Obtain nutrition services referral as needed  Outcome: Progressing     Problem: GENITOURINARY - ADULT  Goal: Absence of urinary retention  Description: INTERVENTIONS:- Assess patient’s ability to void and empty bladder- Monitor I/O- Bladder scan as needed- Discuss with physician/AP medications to alleviate retention as needed- Discuss catheterization for long term situations as appropriate  Outcome: Progressing

## 2025-04-13 LAB
BACTERIA BLD CULT: NORMAL
BACTERIA BLD CULT: NORMAL

## 2025-04-14 ENCOUNTER — TELEPHONE (OUTPATIENT)
Dept: GASTROENTEROLOGY | Facility: CLINIC | Age: 50
End: 2025-04-14

## 2025-04-14 LAB — TTG IGA SER IA-ACNC: 0.6 U/ML (ref ?–10)

## 2025-04-14 NOTE — TELEPHONE ENCOUNTER
Patient is requesting a work note for today.  Please phone [phone] when note is available for .

## 2025-04-14 NOTE — TELEPHONE ENCOUNTER
Patient and got VM . LMOM that work note is ready for him to . LMOM that when he comes in to see one of the MRs up front and they will know where to get note...The patient left the office before the visit was finished. Office # as well

## 2025-04-14 NOTE — TELEPHONE ENCOUNTER
Routing to PA      Patient called and stated he as in hospital all last week ... He saw Judith CARROLL during his hospital stay. He had stated that if he needed another day to take to recover we can give him work note. He is asking for a work note excusing him from work today ( 4/14/2025 )     Please advise. Thank you !

## 2025-04-17 ENCOUNTER — TELEPHONE (OUTPATIENT)
Dept: GASTROENTEROLOGY | Facility: CLINIC | Age: 50
End: 2025-04-17

## 2025-04-17 NOTE — TELEPHONE ENCOUNTER
PEP called re pt calling to say Vancomycin is not at pharmacy.  Called [facility] and they state pt picked up script on 4/11/25.  Called pt and advised of this and he checked meds and noted he has Vancomycin. Reviewed instructions to stop Xifaxan and take Vanco.  Pt verbalizes understanding.

## 2025-04-17 NOTE — TELEPHONE ENCOUNTER
Pt calling to advise [facility] does nor have script for vanco. Showing send over on 04/11. Spoke w/Mile in triage, will call pharmacy and call pt back once script confirmed on their end. Relayed to pt, pt confirmed understanding.

## 2025-04-17 NOTE — TELEPHONE ENCOUNTER
"Results and recommendations given to patient.  He is aware the antibiotic was sent to pharmacy yesterday evening and should be ready for  now.    As per NASEEM Wong:  \"C diff test came back partially positive. It may mean you are colonized with the spore called C diff, but because you were having diarrhea I am going to send an antibiotic called vancomycin for you to take instead of the Xiafxan. Please keep the Xiafxan at home, but you may feel better after the vancomycin.\"  " [Father] : father

## 2025-04-21 LAB
INFLIXIMAB AB SERPL-MCNC: <22 NG/ML
INFLIXIMAB SERPL-MCNC: 24 UG/ML

## 2025-05-21 ENCOUNTER — TELEPHONE (OUTPATIENT)
Dept: GASTROENTEROLOGY | Facility: CLINIC | Age: 50
End: 2025-05-21

## 2025-05-21 ENCOUNTER — OFFICE VISIT (OUTPATIENT)
Dept: GASTROENTEROLOGY | Facility: CLINIC | Age: 50
End: 2025-05-21

## 2025-05-21 VITALS
SYSTOLIC BLOOD PRESSURE: 136 MMHG | WEIGHT: 233 LBS | TEMPERATURE: 98 F | BODY MASS INDEX: 31.56 KG/M2 | OXYGEN SATURATION: 98 % | HEART RATE: 76 BPM | RESPIRATION RATE: 18 BRPM | HEIGHT: 72 IN | DIASTOLIC BLOOD PRESSURE: 84 MMHG

## 2025-05-21 DIAGNOSIS — K21.9 GASTROESOPHAGEAL REFLUX DISEASE, UNSPECIFIED WHETHER ESOPHAGITIS PRESENT: ICD-10-CM

## 2025-05-21 DIAGNOSIS — K58.0 IRRITABLE BOWEL SYNDROME WITH DIARRHEA: Primary | ICD-10-CM

## 2025-05-21 DIAGNOSIS — A04.72 C. DIFFICILE DIARRHEA: ICD-10-CM

## 2025-05-21 RX ORDER — NORTRIPTYLINE HYDROCHLORIDE 10 MG/1
10 CAPSULE ORAL
Qty: 30 CAPSULE | Refills: 1 | Status: SHIPPED | OUTPATIENT
Start: 2025-05-21

## 2025-05-21 RX ORDER — HYDROXYCHLOROQUINE SULFATE 200 MG/1
TABLET, FILM COATED ORAL
COMMUNITY

## 2025-05-21 RX ORDER — ACETAMINOPHEN 325 MG/1
650 TABLET ORAL
COMMUNITY
Start: 2024-10-21

## 2025-05-21 RX ORDER — DOXYCYCLINE 100 MG/1
CAPSULE ORAL
COMMUNITY
Start: 2025-02-15

## 2025-05-21 RX ORDER — SENNOSIDES 8.6 MG
1 CAPSULE ORAL EVERY 8 HOURS PRN
COMMUNITY

## 2025-05-21 RX ORDER — FAMOTIDINE 40 MG/1
40 TABLET, FILM COATED ORAL 2 TIMES DAILY PRN
Qty: 180 TABLET | Refills: 2 | Status: SHIPPED | OUTPATIENT
Start: 2025-05-21

## 2025-05-21 RX ORDER — DIPHENHYDRAMINE HCL 25 MG
25 CAPSULE ORAL
COMMUNITY
Start: 2025-05-19

## 2025-05-21 RX ORDER — ESCITALOPRAM OXALATE 20 MG/1
TABLET ORAL
COMMUNITY
Start: 2025-04-17

## 2025-05-21 RX ORDER — DEXAMETHASONE SODIUM PHOSPHATE 4 MG/ML
8 INJECTION, SOLUTION INTRA-ARTICULAR; INTRALESIONAL; INTRAMUSCULAR; INTRAVENOUS; SOFT TISSUE
COMMUNITY
Start: 2025-05-19

## 2025-05-21 RX ORDER — METHYLPREDNISOLONE 8 MG/1
TABLET ORAL
COMMUNITY
Start: 2025-02-24

## 2025-05-21 RX ORDER — SODIUM PICOSULFATE, MAGNESIUM OXIDE, AND ANHYDROUS CITRIC ACID 10; 3.5; 12 MG/160ML; G/160ML; G/160ML
LIQUID ORAL
COMMUNITY

## 2025-05-21 RX ORDER — CYCLOSPORINE 0.5 MG/ML
EMULSION OPHTHALMIC
COMMUNITY
Start: 2025-04-13

## 2025-05-21 RX ORDER — TRIAMCINOLONE ACETONIDE 5 MG/G
OINTMENT TOPICAL
COMMUNITY
Start: 2025-03-20

## 2025-05-21 RX ORDER — BENZONATATE 200 MG/1
CAPSULE ORAL
COMMUNITY
Start: 2025-02-15

## 2025-05-21 RX ORDER — GABAPENTIN 400 MG/1
CAPSULE ORAL
COMMUNITY
Start: 2025-02-21

## 2025-05-21 RX ORDER — SULFASALAZINE 500 MG/1
1000 TABLET ORAL 2 TIMES DAILY
COMMUNITY
Start: 2025-05-19

## 2025-05-21 RX ORDER — AMMONIUM LACTATE 5 %
LOTION (GRAM) TOPICAL
COMMUNITY
Start: 2025-02-28

## 2025-05-21 RX ORDER — DIPHENHYDRAMINE HYDROCHLORIDE 50 MG/ML
25 INJECTION, SOLUTION INTRAMUSCULAR; INTRAVENOUS
COMMUNITY
Start: 2025-05-19

## 2025-05-21 RX ORDER — SODIUM CHLORIDE 9 MG/ML
500-1000 INJECTION, SOLUTION INTRAVENOUS
COMMUNITY
Start: 2025-05-19 | End: 2026-05-14

## 2025-05-21 RX ORDER — EPINEPHRINE 1 MG/ML
0.3 INJECTION, SOLUTION INTRAMUSCULAR; SUBCUTANEOUS
COMMUNITY
Start: 2025-05-19

## 2025-05-21 RX ORDER — GABAPENTIN 600 MG/1
TABLET ORAL
COMMUNITY
Start: 2025-05-19

## 2025-05-21 NOTE — ASSESSMENT & PLAN NOTE
Orders:    nortriptyline (PAMELOR) 10 mg capsule; Take 1 capsule (10 mg total) by mouth daily at bedtime  Patient has had extensive testing, and likely has exacerbation of IBS from postinfectious etiology.  See plan below as well.  Otherwise, he is agreeable to low FODMAP diet, given and discussed.  He will restart Pamelor at a low dose.  Patient also will consider taking a daily probiotic, recommendations provided.  Over the summer if his abdominal bloating and diarrhea return, he would require repeat C. difficile testing.  If negative, he would be a candidate for Xifaxan again.

## 2025-05-21 NOTE — PROGRESS NOTES
Name: Cory Sheppard      : 1975      MRN: [id number]  Encounter Provider: Judith Franco PA-C  Encounter Date: 2025   Encounter department: [facility] [place]  :  Assessment & Plan  Irritable bowel syndrome with diarrhea    Orders:    nortriptyline (PAMELOR) 10 mg capsule; Take 1 capsule (10 mg total) by mouth daily at bedtime  Patient has had extensive testing, and likely has exacerbation of IBS from postinfectious etiology.  See plan below as well.  Otherwise, he is agreeable to low FODMAP diet, given and discussed.  He will restart Pamelor at a low dose.  Patient also will consider taking a daily probiotic, recommendations provided.  Over the summer if his abdominal bloating and diarrhea return, he would require repeat C. difficile testing.  If negative, he would be a candidate for Xifaxan again.  Gastroesophageal reflux disease, unspecified whether esophagitis present    Orders:    famotidine (PEPCID) 40 MG tablet; Take 1 tablet (40 mg total) by mouth 2 (two) times a day as needed for heartburn    C. difficile diarrhea  Because the patient was having active diarrhea despite having negative toxin and positive PCR, we treated him with vancomycin.  Reports that his stools are now soft to solid.  We would prefer that he avoid PPIs as it increases the risk of C. difficile recurrence.  Follow-up in 3 months, sooner as needed.    History of Present Illness   HPI  Cory Sheppard is a 49 y.o. male with history of rheumatoid arthritis on infliximab biosimilar, psoriatic arthritis,-year-old bowel syndrome, and GERD.  Patient has failed several previous Biologics including Humira, Xeljanz and Cimzia.  He follows with his rheumatologist in [place].  Patient presents the office after he was hospitalized in April for tractable abdominal pain and diarrhea.  Patient had negative CT enterography.  Had a recent colonoscopy with Dr. Boo revealing no signs  of IBD.  While hospitalized, his C. difficile test came back later revealing positive PCR, but negative toxin.  Because the patient was having persistent diarrhea, he was treated with vancomycin.  He then completed a Xifaxan course.  Reports that he still having abdominal pain and his stools are now more very soft to solid.  Patient reports that his rheumatologist wanted to start him on pantoprazole as his infliximab biosimilar dosing is going to be increasing.    Last colonoscopy was in March 2025 by Dr. Boo, which was normal.  He was recommended a 5-year recall secondary to personal history of colon polyps.  Last EGD was in July 2022 with Dr. Newby revealing mild erythema in the stomach.      Review of Systems   Constitutional:  Negative for appetite change, chills, diaphoresis, fatigue and unexpected weight change.   HENT:  Negative for sore throat and trouble swallowing.    Eyes:  Negative for discharge and redness.   Respiratory:  Negative for cough, shortness of breath and wheezing.    Cardiovascular:  Negative for chest pain and palpitations.   Gastrointestinal:  Positive for abdominal pain. Negative for anal bleeding, blood in stool, constipation, diarrhea, nausea, rectal pain and vomiting.   Endocrine: Negative for cold intolerance and heat intolerance.   Musculoskeletal:  Positive for arthralgias and myalgias. Negative for joint swelling.   Skin:  Negative for pallor and rash.   Neurological:  Negative for dizziness, tremors, weakness, light-headedness, numbness and headaches.   Hematological:  Negative for adenopathy. Does not bruise/bleed easily.   Psychiatric/Behavioral:  Negative for behavioral problems, confusion, dysphoric mood and sleep disturbance. The patient is not nervous/anxious.           Objective   /84 (BP Location: Left arm, Patient Position: Sitting, Cuff Size: Standard)   Pulse 76   Temp 98 °F (36.7 °C) (Tympanic)   Resp 18   Ht 6' (1.829 m)   Wt 106 kg (233 lb)    SpO2 98%   BMI 31.60 kg/m²

## 2025-05-21 NOTE — TELEPHONE ENCOUNTER
Tashi Sanderson,    [address] [place] [address] [place] [address]   [phone] (Work)   [phone] (Fax)     Please call to leave a message from me to patient's rheumatologist to let him know that the patient recently had C. difficile so we would prefer that he not be on pantoprazole.  Just wanted to give him an FYI as he may have not been aware.  Thank you!

## 2025-05-22 NOTE — TELEPHONE ENCOUNTER
"Called Dr. Tashi Sanderson's office and spoke with Pamela.  Relayed message as per NASEEM Wong.  \" Please call to leave a message from me to patient's rheumatologist to let him know that the patient recently had C. difficile so we would prefer that he not be on pantoprazole. \"    Pamela voiced understanding and stated that will send the message to    Dr. Sanderson    "

## 2025-06-13 DIAGNOSIS — K58.0 IRRITABLE BOWEL SYNDROME WITH DIARRHEA: ICD-10-CM

## 2025-06-13 RX ORDER — NORTRIPTYLINE HYDROCHLORIDE 10 MG/1
10 CAPSULE ORAL
Qty: 90 CAPSULE | Refills: 1 | Status: SHIPPED | OUTPATIENT
Start: 2025-06-13

## 2025-07-30 ENCOUNTER — TELEPHONE (OUTPATIENT)
Dept: GASTROENTEROLOGY | Facility: CLINIC | Age: 50
End: 2025-07-30

## 2025-07-31 DIAGNOSIS — K58.0 IRRITABLE BOWEL SYNDROME WITH DIARRHEA: Primary | ICD-10-CM

## 2025-07-31 RX ORDER — NORTRIPTYLINE HYDROCHLORIDE 25 MG/1
25 CAPSULE ORAL
Qty: 90 CAPSULE | Refills: 1 | Status: SHIPPED | OUTPATIENT
Start: 2025-07-31

## 2025-08-21 ENCOUNTER — OFFICE VISIT (OUTPATIENT)
Dept: GASTROENTEROLOGY | Facility: CLINIC | Age: 50
End: 2025-08-21

## 2025-08-21 VITALS
OXYGEN SATURATION: 98 % | DIASTOLIC BLOOD PRESSURE: 96 MMHG | WEIGHT: 237 LBS | TEMPERATURE: 98.3 F | SYSTOLIC BLOOD PRESSURE: 140 MMHG | BODY MASS INDEX: 32.1 KG/M2 | HEART RATE: 95 BPM | HEIGHT: 72 IN | RESPIRATION RATE: 18 BRPM

## 2025-08-21 DIAGNOSIS — K58.0 IRRITABLE BOWEL SYNDROME WITH DIARRHEA: ICD-10-CM

## 2025-08-21 DIAGNOSIS — Z51.81 MEDICATION MONITORING ENCOUNTER: Primary | ICD-10-CM

## 2025-08-21 RX ORDER — PREDNISONE 10 MG/1
TABLET ORAL
COMMUNITY
Start: 2025-05-30

## 2025-08-21 RX ORDER — AZATHIOPRINE 50 MG/1
50 TABLET ORAL DAILY
COMMUNITY
Start: 2025-08-05

## 2025-08-22 ENCOUNTER — OFFICE VISIT (OUTPATIENT)
Dept: LAB | Facility: HOSPITAL | Age: 50
End: 2025-08-22
Attending: PHYSICIAN ASSISTANT
Payer: COMMERCIAL

## 2025-08-22 DIAGNOSIS — Z51.81 MEDICATION MONITORING ENCOUNTER: ICD-10-CM

## 2025-08-22 LAB
ATRIAL RATE: 96 BPM
P AXIS: 41 DEGREES
PR INTERVAL: 152 MS
QRS AXIS: 22 DEGREES
QRSD INTERVAL: 90 MS
QT INTERVAL: 376 MS
QTC INTERVAL: 476 MS
T WAVE AXIS: 28 DEGREES
VENTRICULAR RATE: 96 BPM

## 2025-08-22 PROCEDURE — 93005 ELECTROCARDIOGRAM TRACING: CPT

## 2025-08-22 PROCEDURE — 93010 ELECTROCARDIOGRAM REPORT: CPT | Performed by: INTERNAL MEDICINE
